# Patient Record
Sex: FEMALE | Race: WHITE | NOT HISPANIC OR LATINO | Employment: OTHER | URBAN - METROPOLITAN AREA
[De-identification: names, ages, dates, MRNs, and addresses within clinical notes are randomized per-mention and may not be internally consistent; named-entity substitution may affect disease eponyms.]

---

## 2017-04-24 ENCOUNTER — TELEPHONE (OUTPATIENT)
Dept: INTERNAL MEDICINE | Facility: CLINIC | Age: 77
End: 2017-04-24

## 2017-04-24 DIAGNOSIS — M89.9 DISORDER OF BONE AND CARTILAGE: Primary | ICD-10-CM

## 2017-04-24 DIAGNOSIS — M94.9 DISORDER OF BONE AND CARTILAGE: Primary | ICD-10-CM

## 2017-04-24 NOTE — TELEPHONE ENCOUNTER
Pt calling stating had PET Scan at the [facility] 4/18/17 and found had a broken rib, right side. Pt asking for last date of Dexa scan, informed 7/24/15. Pt asking if she should be on medication for brittle bones/osteoprosis. Should pt come in for office visit ? Pt asking to have order placed for upcoming Dexa scan.  Order pended.  Provider please review and advise.

## 2017-04-25 NOTE — TELEPHONE ENCOUNTER
Pt is not due for dexa until 07/17. Pl advise pt to make appt with PCP to discuss meds.-pl inform pt that -PCP back next week.

## 2017-04-26 NOTE — TELEPHONE ENCOUNTER
See the letter from the last DXA. She should be on treatment, if her rheumatologist is not going to manage this she should make appt with me.

## 2017-04-27 NOTE — TELEPHONE ENCOUNTER
This encounter was doned by another provider before we could call pt.     Attempted to contact pt. Left message to call clinic, her Dtrs phone had VM.

## 2017-05-05 ENCOUNTER — OFFICE VISIT (OUTPATIENT)
Dept: INTERNAL MEDICINE | Facility: CLINIC | Age: 77
End: 2017-05-05
Payer: COMMERCIAL

## 2017-05-05 VITALS
WEIGHT: 204 LBS | TEMPERATURE: 98.6 F | OXYGEN SATURATION: 92 % | BODY MASS INDEX: 42.82 KG/M2 | HEIGHT: 58 IN | HEART RATE: 82 BPM | RESPIRATION RATE: 16 BRPM | SYSTOLIC BLOOD PRESSURE: 128 MMHG | DIASTOLIC BLOOD PRESSURE: 68 MMHG

## 2017-05-05 DIAGNOSIS — M85.80 OSTEOPENIA: Primary | ICD-10-CM

## 2017-05-05 PROCEDURE — 99214 OFFICE O/P EST MOD 30 MIN: CPT | Performed by: INTERNAL MEDICINE

## 2017-05-05 RX ORDER — ALENDRONATE SODIUM 70 MG/1
70 TABLET ORAL
Qty: 4 TABLET | Refills: 11 | Status: SHIPPED | OUTPATIENT
Start: 2017-05-05 | End: 2017-06-23 | Stop reason: SINTOL

## 2017-05-05 RX ORDER — ACETAMINOPHEN 500 MG
500-1000 TABLET ORAL EVERY 4 HOURS PRN
COMMUNITY
End: 2020-12-17

## 2017-05-05 RX ORDER — TRAMADOL HYDROCHLORIDE 50 MG/1
50 TABLET ORAL 3 TIMES DAILY
COMMUNITY
End: 2020-12-17

## 2017-05-05 RX ORDER — LEVOCARNITINE 330 MG/1
TABLET ORAL
COMMUNITY
End: 2017-11-20

## 2017-05-05 RX ORDER — PREDNISONE 5 MG/1
5 TABLET ORAL DAILY
COMMUNITY

## 2017-05-05 RX ORDER — DOXYCYCLINE 100 MG/1
CAPSULE ORAL
COMMUNITY
End: 2017-08-18

## 2017-05-05 NOTE — MR AVS SNAPSHOT
"              After Visit Summary   2017    Nicolás Wyman    MRN: [id number]           Patient Information     Date Of Birth          1940        Visit Information        Provider Department      2017 3:40 PM Yocasta Hernandez MD [facility]        Today's Diagnoses     Osteopenia    -  1       Follow-ups after your visit        Who to contact     If you have questions or need follow up information about today's clinic visit or your schedule please contact [facility] directly at [phone].  Normal or non-critical lab and imaging results will be communicated to you by MyChart, letter or phone within 4 business days after the clinic has received the results. If you do not hear from us within 7 days, please contact the clinic through [employer] or phone. If you have a critical or abnormal lab result, we will notify you by phone as soon as possible.  Submit refill requests through [employer] or call your pharmacy and they will forward the refill request to us. Please allow 3 business days for your refill to be completed.          Additional Information About Your Visit        MyChart Information     [employer] lets you send messages to your doctor, view your test results, renew your prescriptions, schedule appointments and more. To sign up, go to [email or web address] . Click on \"Log in\" on the left side of the screen, which will take you to the Welcome page. Then click on \"Sign up Now\" on the right side of the page.     You will be asked to enter the access code listed below, as well as some personal information. Please follow the directions to create your username and password.     Your access code is: [email or web address]  Expires: 2017 12:23 PM     Your access code will  in 90 days. If you need help or a new code, please call your [facility] or [phone].        Care EveryWhere ID     This is your Care EveryWhere ID. This could be used by other organizations to access " "your [facility] medical records  [id number]        Your Vitals Were     Pulse Temperature Respirations Height Pulse Oximetry BMI (Body Mass Index)    82 98.6  F (37  C) (Oral) 16 4' 10\" (1.473 m) 92% 42.64 kg/m2       Blood Pressure from Last 3 Encounters:   05/05/17 128/68   10/28/16 132/60   11/13/15 110/68    Weight from Last 3 Encounters:   05/05/17 204 lb (92.5 kg)   10/28/16 201 lb (91.2 kg)   10/22/15 203 lb (92.1 kg)              Today, you had the following     No orders found for display         Today's Medication Changes          These changes are accurate as of: 5/5/17 11:59 PM.  If you have any questions, ask your nurse or doctor.               Start taking these medicines.        Dose/Directions    alendronate 70 MG tablet   Commonly known as:  FOSAMAX   Used for:  Osteopenia   Started by:  Yocasta Hernandez MD        Dose:  70 mg   Take 1 tablet (70 mg) by mouth every 7 days Take 60 minutes before am meal with 8 oz. water. Remain upright for 30 minutes.   Quantity:  4 tablet   Refills:  11            Where to get your medicines      These medications were sent to [facility] [address] [place] [id number]     Phone:  [phone]     alendronate 70 MG tablet                Primary Care Provider Office Phone # Fax #    Yocasta Hernandez [phone]       [facility] [address] [facility] [address]  [facility] [address]        Thank you!     Thank you for choosing [facility]  for your care. Our goal is always to provide you with excellent care. Hearing back from our patients is one way we can continue to improve our services. Please take a few minutes to complete the written survey that you may receive in the mail after your visit with us. Thank you!             Your Updated Medication List - Protect others around you: Learn how to safely use, store and throw away your medicines at www.disposemymeds.org.    "       This list is accurate as of: 5/5/17 11:59 PM.  Always use your most recent med list.                   Brand Name Dispense Instructions for use    * albuterol (2.5 MG/3ML) 0.083% neb solution     100 mL    Take 1 vial (2.5 mg) by nebulization every 6 hours as needed       * albuterol 108 (90 BASE) MCG/ACT Inhaler    PROAIR HFA/PROVENTIL HFA/VENTOLIN HFA    1 Inhaler    Inhale 2 puffs into the lungs every 6 hours as needed for shortness of breath / dyspnea       alendronate 70 MG tablet    FOSAMAX    4 tablet    Take 1 tablet (70 mg) by mouth every 7 days Take 60 minutes before am meal with 8 oz. water. Remain upright for 30 minutes.       amLODIPine 5 MG tablet    NORVASC    135 tablet    Take 1.5 tablets (7.5 mg) by mouth daily       aspirin 325 MG tablet     90 tablet    Take 1 tablet by mouth daily.       atenolol 100 MG tablet    TENORMIN    90 tablet    Take 1 tablet (100 mg) by mouth daily       clotrimazole-betamethasone cream    LOTRISONE    30 g    Apply topically 2 times daily       doxycycline 100 MG capsule    VIBRAMYCIN    180 capsule    Take 1 capsule by mouth 2 times daily.       doxycycline Monohydrate 100 MG Caps          fluticasone 110 MCG/ACT Inhaler    FLOVENT HFA    1 Inhaler    Inhale 2 puffs into the lungs 2 times daily       fluticasone 50 MCG/ACT spray    FLONASE    1 Package    Spray 1-2 sprays into both nostrils daily       glucosamine 500 MG Caps      Take 1 capful by mouth daily.       levOCARNitine 330 MG tablet    CARNITOR     Take by mouth 6 times daily       losartan 100 MG tablet    COZAAR    90 tablet    Take 1 tablet (100 mg) by mouth daily       PLAQUENIL 200 MG tablet   Generic drug:  hydroxychloroquine     60 tablet    Take 1 tablet by mouth 2 times daily.       PREDNISONE PO      Take 5 mg by mouth daily       SPIRIVA HANDIHALER IN      Reported on 5/5/2017       traMADol 50 MG tablet    ULTRAM     Take 100 mg by mouth       TYLENOL 500 MG tablet   Generic drug:   acetaminophen      Take 500-1,000 mg by mouth every 4 hours as needed for mild pain       * Notice:  This list has 2 medication(s) that are the same as other medications prescribed for you. Read the directions carefully, and ask your doctor or other care provider to review them with you.

## 2017-05-05 NOTE — NURSING NOTE
"Chief Complaint   Patient presents with     Consult     Discuss medication for osteoporosis       Initial /68  Pulse 82  Temp 98.6  F (37  C) (Oral)  Resp 16  Ht 4' 10\" (1.473 m)  Wt 204 lb (92.5 kg)  SpO2 92%  BMI 42.64 kg/m2 Estimated body mass index is 42.64 kg/(m^2) as calculated from the following:    Height as of this encounter: 4' 10\" (1.473 m).    Weight as of this encounter: 204 lb (92.5 kg).  Medication Reconciliation: complete      Josh Brito, TOMASZ      "

## 2017-05-05 NOTE — PROGRESS NOTES
SUBJECTIVE:                                                    Nicolás Wyman is a 77 year old female who presents to clinic today for the following health issues:    Subjective:  Nicolás Wyman is a 77 year old female who presents for follow up of abnormal DEXA. She is postmenopausal.   Other risks :RA.    Patient Active Problem List   Diagnosis     Essential hypertension, benign     Rheumatoid arthritis (HCC)     Disorder of bone and cartilage     CARDIOVASCULAR SCREENING; LDL GOAL LESS THAN 130     Advanced directives, counseling/discussion     Health Care Home     Gait disturbance     Non Hodgkin's lymphoma (H)     Pulmonary interstitial fibrosis (H)     JIGNESH (obstructive sleep apnea)     Morbid obesity, unspecified obesity type (H)      Current Outpatient Prescriptions   Medication Sig Dispense Refill     levOCARNitine (CARNITOR) 330 MG tablet Take by mouth 6 times daily       acetaminophen (TYLENOL) 500 MG tablet Take 500-1,000 mg by mouth every 4 hours as needed for mild pain       PREDNISONE PO Take 5 mg by mouth daily       alendronate (FOSAMAX) 70 MG tablet Take 1 tablet (70 mg) by mouth every 7 days Take 60 minutes before am meal with 8 oz. water. Remain upright for 30 minutes. 4 tablet 11     atenolol (TENORMIN) 100 MG tablet Take 1 tablet (100 mg) by mouth daily 90 tablet 3     losartan (COZAAR) 100 MG tablet Take 1 tablet (100 mg) by mouth daily 90 tablet 3     amLODIPine (NORVASC) 5 MG tablet Take 1.5 tablets (7.5 mg) by mouth daily 135 tablet 3     fluticasone (FLOVENT HFA) 110 MCG/ACT inhaler Inhale 2 puffs into the lungs 2 times daily 1 Inhaler 3     albuterol (PROAIR HFA, PROVENTIL HFA, VENTOLIN HFA) 108 (90 BASE) MCG/ACT inhaler Inhale 2 puffs into the lungs every 6 hours as needed for shortness of breath / dyspnea 1 Inhaler 5     albuterol (2.5 MG/3ML) 0.083% nebulizer solution Take 1 vial (2.5 mg) by nebulization every 6 hours as needed 100 mL 5     hydroxychloroquine (PLAQUENIL) 200 MG  "tablet Take 1 tablet by mouth 2 times daily. 60 tablet 1     aspirin 325 MG tablet Take 1 tablet by mouth daily. 90 tablet 3     doxycycline (VIBRAMYCIN) 100 MG capsule Take 1 capsule by mouth 2 times daily. 180 capsule 1     Glucosamine 500 MG CAPS Take 1 capful by mouth daily.       traMADol (ULTRAM) 50 MG tablet Take 100 mg by mouth       doxycycline Monohydrate 100 MG CAPS        Tiotropium Bromide Monohydrate (SPIRIVA HANDIHALER IN) Reported on 5/5/2017       clotrimazole-betamethasone (LOTRISONE) cream Apply topically 2 times daily (Patient not taking: Reported on 5/5/2017) 30 g 1     fluticasone (FLONASE) 50 MCG/ACT nasal spray Spray 1-2 sprays into both nostrils daily (Patient not taking: Reported on 5/5/2017) 1 Package 11        Other concerns: none    Objective:  Patient alert in NAD  /68  Pulse 82  Temp 98.6  F (37  C) (Oral)  Resp 16  Ht 4' 10\" (1.473 m)  Wt 204 lb (92.5 kg)  SpO2 92%  BMI 42.64 kg/m2      Not examined.    DXA: 2015  T-score -1.9  Total fracture risk: 15%  Hip fracture risk: 3.7%    Assessment/Plan:  1. Osteopenia: discussed results of DEXA, etiology and definitions of osteopenia and osteoporosis, risks, treatments. Recommend treat with Fosamax. Advised of potential side effects of medications and recommend she contact me for significant reactions.   Recheck 2 years.     Yocasta Hernandez MD  [facility]     30 minutes spent with the patient, >50% of time spent counseling about medication, fracture risk    "

## 2017-06-23 ENCOUNTER — TELEPHONE (OUTPATIENT)
Dept: INTERNAL MEDICINE | Facility: CLINIC | Age: 77
End: 2017-06-23

## 2017-06-23 DIAGNOSIS — M85.80 OSTEOPENIA, UNSPECIFIED LOCATION: Primary | ICD-10-CM

## 2017-06-23 NOTE — TELEPHONE ENCOUNTER
Taking a once monthly form, Actonel or Boniva may have less GI side effects than Fosamax. Since taken only once a month, it may not bother if there is some mild symptoms. The other option would be once a year infusion, Reclast, or 6 month injection, Prolia.   I would recommend trying one of the once a month forms, the insurance may require a trial one of them before approving one of the injections. We may have to do a PA for a monthly med also.

## 2017-06-23 NOTE — TELEPHONE ENCOUNTER
Pt states she started on Alendronate about 6 weeks ago. Has taken 6 pills so far.   After every pill she has taken, she gets stomach cramps and diarrhea.   She takes the pill in the AM, and by the afternoon she has diarrhea. The diarrhea lasts a couple of days. Then resolves for the rest of the week until she needs to take the next pill.     She is supposed to take #7 pill tomorrow, but she is not going to. She is going to stop these pills.     Please advise. She is open to changing to alternative.

## 2017-06-26 RX ORDER — RISEDRONATE SODIUM 150 MG/1
150 TABLET, FILM COATED ORAL
Qty: 1 TABLET | Refills: 11 | Status: SHIPPED | OUTPATIENT
Start: 2017-06-26 | End: 2017-11-20

## 2017-06-26 RX ORDER — RISEDRONATE SODIUM 150 MG/1
150 TABLET, FILM COATED ORAL
Qty: 3 TABLET | Refills: 3 | Status: SHIPPED | OUTPATIENT
Start: 2017-06-26 | End: 2017-06-26

## 2017-06-26 NOTE — TELEPHONE ENCOUNTER
Call to daughter. Updated on MD note. Requesting rx for one of the once a month options be ordered. No need for call back, just send rx per daughter.

## 2017-08-16 ENCOUNTER — TELEPHONE (OUTPATIENT)
Dept: INTERNAL MEDICINE | Facility: CLINIC | Age: 77
End: 2017-08-16

## 2017-08-16 NOTE — TELEPHONE ENCOUNTER
Patient called and state that she is experiencing left knee pain 10/10 but is able to ambulate. Patient is taking Prednisone, Tramadol and Tylenol for pain management. Writer asked if patient can wait to be seen in office or needs ED eval d/t pain. Patient stated she can wait. Appointment scheduled for patient on 8/18/17. Patient would like to get a cortisone shot to her left knee. Writer stated that patient should discuss this at the MD visit. Patient verbalized understanding.

## 2017-08-18 ENCOUNTER — OFFICE VISIT (OUTPATIENT)
Dept: INTERNAL MEDICINE | Facility: CLINIC | Age: 77
End: 2017-08-18
Payer: COMMERCIAL

## 2017-08-18 ENCOUNTER — RADIANT APPOINTMENT (OUTPATIENT)
Dept: GENERAL RADIOLOGY | Facility: CLINIC | Age: 77
End: 2017-08-18
Attending: NURSE PRACTITIONER
Payer: COMMERCIAL

## 2017-08-18 VITALS
OXYGEN SATURATION: 95 % | BODY MASS INDEX: 42.17 KG/M2 | HEART RATE: 83 BPM | SYSTOLIC BLOOD PRESSURE: 120 MMHG | HEIGHT: 58 IN | WEIGHT: 200.9 LBS | DIASTOLIC BLOOD PRESSURE: 60 MMHG | TEMPERATURE: 98.3 F

## 2017-08-18 DIAGNOSIS — R05.9 COUGH: ICD-10-CM

## 2017-08-18 DIAGNOSIS — B97.89 VIRAL RESPIRATORY ILLNESS: ICD-10-CM

## 2017-08-18 DIAGNOSIS — J98.8 VIRAL RESPIRATORY ILLNESS: ICD-10-CM

## 2017-08-18 DIAGNOSIS — M25.562 ACUTE PAIN OF LEFT KNEE: ICD-10-CM

## 2017-08-18 DIAGNOSIS — M25.562 ACUTE PAIN OF LEFT KNEE: Primary | ICD-10-CM

## 2017-08-18 PROCEDURE — 99213 OFFICE O/P EST LOW 20 MIN: CPT | Performed by: NURSE PRACTITIONER

## 2017-08-18 PROCEDURE — 73560 X-RAY EXAM OF KNEE 1 OR 2: CPT | Mod: LT

## 2017-08-18 RX ORDER — CODEINE PHOSPHATE AND GUAIFENESIN 10; 100 MG/5ML; MG/5ML
1 SOLUTION ORAL EVERY 4 HOURS PRN
Qty: 240 ML | Refills: 1 | Status: SHIPPED | OUTPATIENT
Start: 2017-08-18 | End: 2017-11-20

## 2017-08-18 NOTE — PATIENT INSTRUCTIONS
X ray in [address]    Dr Parker   Specialty building   [address]  [address]     8/25/2017 Friday at 9 am     Cough syrup with codeine for cough     Follow up with worsening sx or failure to improve or with any questions or concerns.

## 2017-08-18 NOTE — PROGRESS NOTES
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  Medial joint line pain     Prednisone for knee pain per rheumatology    Cold and cough since Monday - white secretions         Problem list and histories reviewed & adjusted, as indicated.  Additional history: as documented    Patient Active Problem List   Diagnosis     Essential hypertension, benign     Rheumatoid arthritis (HCC)     Disorder of bone and cartilage     CARDIOVASCULAR SCREENING; LDL GOAL LESS THAN 130     Advanced directives, counseling/discussion     Health Care Home     Gait disturbance     Non Hodgkin's lymphoma (H)     Pulmonary interstitial fibrosis (H)     JIGNESH (obstructive sleep apnea)     Morbid obesity, unspecified obesity type (H)     Past Surgical History:   Procedure Laterality Date     ARTHROPLASTY KNEE  11/7/2011    Procedure:ARTHROPLASTY KNEE; Right Total Knee Arthroplasty  [facility]; Surgeon:DIVINA PAZ; Location:[facility] OR     ARTHROPLASTY REVISION KNEE  12/29/2011    Procedure:ARTHROPLASTY REVISION KNEE; Irrigation and debridement, right total knee arthroplasty with polyethylene exchange.  ; Surgeon:DIVINA PAZ; Location:[facility] OR     C NONSPECIFIC PROCEDURE  6/22/12    removal of right TKA     C VAGINAL HYSTERECTOMY  1981    has ovaries      EXCHANGE POLY COMPONENT ARTHROPLASTY KNEE  11/26/2011    Procedure:EXCHANGE POLY COMPONENT ARTHROPLASTY KNEE; EXCHANGE POLY COMPONENT ARTHROPLASTY KNEE RIGHT, irrigation and debridement right knee; Surgeon:FELIPE TAYLOR; Location:[facility] OR     HC CORRECT BUNION,SIMPLE  2002     HC REMOVAL GALLBLADDER  1981     IRRIGATION AND DEBRIDEMENT KNEE, COMBINED  11/26/2011    Procedure:COMBINED IRRIGATION AND  "DEBRIDEMENT KNEE; Surgeon:FELIPE TAYLOR; Location:[facility] OR     IRRIGATION AND DEBRIDEMENT KNEE, COMBINED  12/29/2011    Procedure:COMBINED IRRIGATION AND DEBRIDEMENT KNEE; Surgeon:DIVINA BAUTISTA; Location:[facility] OR     IRRIGATION AND DEBRIDEMENT KNEE, COMBINED  2/9/2012    Procedure:COMBINED IRRIGATION AND DEBRIDEMENT KNEE; Wound Debridement Right Knee with Placement of Wound Vac; Surgeon:DIVINA BAUTISTA; Location:[facility] OR       Social History   Substance Use Topics     Smoking status: Never Smoker     Smokeless tobacco: Never Used     Alcohol use No     Family History   Problem Relation Age of Onset     HEART DISEASE Mother      Hypertension Mother      Eye Disorder Mother      macular degen     Family History Negative Father      Blood Disease Daughter      non-hodgkins lymphoma in 1985     Hypertension Sister      Lipids Sister      Respiratory Daughter      asthma     Coronary Artery Disease Daughter      Hypertension Daughter              Reviewed and updated as needed this visit by clinical staffTobacco  Allergies  Meds  Problems  Med Hx  Surg Hx  Fam Hx  Soc Hx        Reviewed and updated as needed this visit by Provider         ROS:  Constitutional, HEENT, cardiovascular, pulmonary, GI, , musculoskeletal, neuro, skin, endocrine and psych systems are negative, except as otherwise noted.      OBJECTIVE:   /60  Pulse 83  Temp 98.3  F (36.8  C) (Oral)  Ht 4' 10\" (1.473 m)  Wt 200 lb 14.4 oz (91.1 kg)  SpO2 95%  BMI 41.99 kg/m2  Body mass index is 41.99 kg/(m^2).  GENERAL:  alert and no distress  RESP: lungs clear to auscultation - no rales, rhonchi or wheezes  CV: regular rate and rhythm  MS: pain in right knee - in joint line medial pain and mild lateral discomfort ; discomfort with patella compression   PSYCH: mentation appears normal, affect normal/bright    Diagnostic Test Results:  X ray pending     ASSESSMENT/PLAN:             1. Acute pain of left knee  Will x ray and have " her see ortho   - XR Knee Left 1/2 Views; Future    2. Viral respiratory illness  Discussed antibiotics not indicated   Lungs clear and white secretions     3. Cough    - guaiFENesin-codeine (ROBITUSSIN AC) 100-10 MG/5ML SOLN solution; Take 5 mLs by mouth every 4 hours as needed for cough  Dispense: 240 mL; Refill: 1    Patient Instructions   X ray in [address]    Dr Parker   [facility]   [address]  [address]     8/25/2017 Friday at 9 am     Cough syrup with codeine for cough     Follow up with worsening sx or failure to improve or with any questions or concerns.         PARIS Medina [facility]

## 2017-08-18 NOTE — MR AVS SNAPSHOT
After Visit Summary   8/18/2017    Nicolás Wyman    MRN: [id number]           Patient Information     Date Of Birth          1940        Visit Information        Provider Department      8/18/2017 8:40 AM Ivonne Hays APRN CNP [facility]        Today's Diagnoses     Acute pain of left knee    -  1    Viral respiratory illness        Cough          Care Instructions    X ray in [address]    Dr Parker   Specialty building   [address]  [address] [facility]     8/25/2017 Friday at 9 am     Cough syrup with codeine for cough     Follow up with worsening sx or failure to improve or with any questions or concerns.             Follow-ups after your visit        Your next 10 appointments already scheduled     Aug 25, 2017  9:00 AM CDT   [facility]   [facility] [place]    [address] [facility]  [address]  [facility] [address]   [phone]              Future tests that were ordered for you today     Open Future Orders        Priority Expected Expires Ordered    XR Knee Left 1/2 Views Routine 8/18/2017 8/18/2018 8/18/2017            Who to contact     If you have questions or need follow up information about today's clinic visit or your schedule please contact [facility] directly at [phone].  Normal or non-critical lab and imaging results will be communicated to you by MyChart, letter or phone within 4 business days after the clinic has received the results. If you do not hear from us within 7 days, please contact the clinic through MyChart or phone. If you have a critical or abnormal lab result, we will notify you by phone as soon as possible.  Submit refill requests through [employer] or call your pharmacy and they will forward the refill request to us. Please allow 3 business days for your refill to be completed.          Additional Information About Your Visit        Best[employer]  "Information     [employer] lets you send messages to your doctor, view your test results, renew your prescriptions, schedule appointments and more. To sign up, go to [email or web address] . Click on \"Log in\" on the left side of the screen, which will take you to the Welcome page. Then click on \"Sign up Now\" on the right side of the page.     You will be asked to enter the access code listed below, as well as some personal information. Please follow the directions to create your username and password.     Your access code is: [email or web address]  Expires: 2017  8:59 AM     Your access code will  in 90 days. If you need help or a new code, please call your [place] clinic or [phone].        Care EveryWhere ID     This is your Care EveryWhere ID. This could be used by other organizations to access your [place] medical records  [id number]        Your Vitals Were     Pulse Temperature Height Pulse Oximetry BMI (Body Mass Index)       83 98.3  F (36.8  C) (Oral) 4' 10\" (1.473 m) 95% 41.99 kg/m2        Blood Pressure from Last 3 Encounters:   17 120/60   17 128/68   10/28/16 132/60    Weight from Last 3 Encounters:   17 200 lb 14.4 oz (91.1 kg)   17 204 lb (92.5 kg)   10/28/16 201 lb (91.2 kg)              We Performed the Following     PAF COMPLETED          Today's Medication Changes          These changes are accurate as of: 17  9:02 AM.  If you have any questions, ask your nurse or doctor.               Start taking these medicines.        Dose/Directions    guaiFENesin-codeine 100-10 MG/5ML Soln solution   Commonly known as:  ROBITUSSIN AC   Used for:  Cough   Started by:  Ivonne aHys APRN CNP        Dose:  1 tsp.   Take 5 mLs by mouth every 4 hours as needed for cough   Quantity:  240 mL   Refills:  1            Where to get your medicines      Some of these will need a paper prescription and others can be bought over the counter.  Ask your nurse if you have " questions.     Bring a paper prescription for each of these medications     guaiFENesin-codeine 100-10 MG/5ML Soln solution                Primary Care Provider Office Phone # Fax #    Yocasta Hernandez [phone]       [address]  [facility] [address]        Equal Access to Services     NICOLE SEXTON : Hadii aad ku hadasho Soomaali, waaxda luqadaha, qaybta kaalmada adeegyada, waxay idiin hayaan adeeg lynne larafael . So [facility] [phone].    ATENCIÓN: Si habla español, tiene a alexandre disposición servicios gratuitos de asistencia lingüística. [facility] [phone].    We comply with applicable federal civil rights laws and [place] laws. We do not discriminate on the basis of race, color, national origin, age, disability sex, sexual orientation or gender identity.            Thank you!     Thank you for choosing [facility]  for your care. Our goal is always to provide you with excellent care. Hearing back from our patients is one way we can continue to improve our services. Please take a few minutes to complete the written survey that you may receive in the mail after your visit with us. Thank you!             Your Updated Medication List - Protect others around you: Learn how to safely use, store and throw away your medicines at www.disposemymeds.org.          This list is accurate as of: 8/18/17  9:02 AM.  Always use your most recent med list.                   Brand Name Dispense Instructions for use Diagnosis    * albuterol (2.5 MG/3ML) 0.083% neb solution     100 mL    Take 1 vial (2.5 mg) by nebulization every 6 hours as needed    Bronchitis with bronchospasm       * albuterol 108 (90 BASE) MCG/ACT Inhaler    PROAIR HFA/PROVENTIL HFA/VENTOLIN HFA    1 Inhaler    Inhale 2 puffs into the lungs every 6 hours as needed for shortness of breath / dyspnea    Cough       amLODIPine 5 MG tablet    NORVASC    135 tablet    Take 1.5 tablets (7.5 mg) by mouth daily    Essential  hypertension, benign       aspirin 325 MG tablet     90 tablet    Take 1 tablet by mouth daily.        atenolol 100 MG tablet    TENORMIN    90 tablet    Take 1 tablet (100 mg) by mouth daily    Essential hypertension, benign       clotrimazole-betamethasone cream    LOTRISONE    30 g    Apply topically 2 times daily    Yeast infection of the skin       doxycycline 100 MG capsule    VIBRAMYCIN    180 capsule    Take 1 capsule by mouth 2 times daily.    Septic arthritis (H)       glucosamine 500 MG Caps      Take 1 capful by mouth daily.        guaiFENesin-codeine 100-10 MG/5ML Soln solution    ROBITUSSIN AC    240 mL    Take 5 mLs by mouth every 4 hours as needed for cough    Cough       levOCARNitine 330 MG tablet    CARNITOR     Take by mouth 6 times daily        losartan 100 MG tablet    COZAAR    90 tablet    Take 1 tablet (100 mg) by mouth daily    Essential hypertension, benign       PLAQUENIL 200 MG tablet   Generic drug:  hydroxychloroquine     60 tablet    Take 1 tablet by mouth 2 times daily.        PREDNISONE PO      Take 5 mg by mouth daily        RISEdronate 150 MG tablet    ACTONEL    1 tablet    Take 1 tablet (150 mg) by mouth every 30 days Take 60 minutes before am meal with 8 oz. Water. Do not lie down until after eating.    Osteopenia, unspecified location       SPIRIVA HANDIHALER IN      Reported on 5/5/2017        traMADol 50 MG tablet    ULTRAM     Take 100 mg by mouth        TYLENOL 500 MG tablet   Generic drug:  acetaminophen      Take 500-1,000 mg by mouth every 4 hours as needed for mild pain        * Notice:  This list has 2 medication(s) that are the same as other medications prescribed for you. Read the directions carefully, and ask your doctor or other care provider to review them with you.

## 2017-08-18 NOTE — NURSING NOTE
"Chief Complaint   Patient presents with     Musculoskeletal Problem     left knee       Initial /60  Pulse 83  Temp 98.3  F (36.8  C) (Oral)  Ht 4' 10\" (1.473 m)  Wt 200 lb 14.4 oz (91.1 kg)  SpO2 95%  BMI 41.99 kg/m2 Estimated body mass index is 41.99 kg/(m^2) as calculated from the following:    Height as of this encounter: 4' 10\" (1.473 m).    Weight as of this encounter: 200 lb 14.4 oz (91.1 kg).  Medication Reconciliation: complete    "

## 2017-08-25 ENCOUNTER — OFFICE VISIT (OUTPATIENT)
Dept: ORTHOPEDICS | Facility: CLINIC | Age: 77
End: 2017-08-25
Payer: COMMERCIAL

## 2017-08-25 VITALS — WEIGHT: 201 LBS | HEIGHT: 58 IN | BODY MASS INDEX: 42.19 KG/M2 | HEART RATE: 80 BPM

## 2017-08-25 DIAGNOSIS — M17.10 PRIMARY OSTEOARTHRITIS OF KNEE, UNSPECIFIED LATERALITY: Primary | ICD-10-CM

## 2017-08-25 PROCEDURE — 99203 OFFICE O/P NEW LOW 30 MIN: CPT | Mod: 25 | Performed by: ORTHOPAEDIC SURGERY

## 2017-08-25 PROCEDURE — 20610 DRAIN/INJ JOINT/BURSA W/O US: CPT | Mod: RT | Performed by: ORTHOPAEDIC SURGERY

## 2017-08-25 NOTE — PROGRESS NOTES
HISTORY OF PRESENT ILLNESS:    Nicolás Wyman is a 77 year old female who is seen in consultation at the request of Ivonne Hays for right knee pain    Present symptoms: pain has been going on about 2-3 weeks, worse anteriorly and medial.  No popping, instability described.  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    removal right TKA 6/12     Non Hodgkin's lymphoma (H) 6/14    chemo x4, clearing by PET     Pulmonary interstitial fibrosis (H)      Rheumatoid arthritis(714.0)     [facility]     Steroid long-term use        Past Surgical History:   Procedure Laterality Date     ARTHROPLASTY KNEE  11/7/2011    Procedure:ARTHROPLASTY KNEE; Right Total Knee Arthroplasty  [facility]; Surgeon:DIVINA BAUTISTA; Location:[facility] OR     ARTHROPLASTY REVISION KNEE  12/29/2011    Procedure:ARTHROPLASTY REVISION KNEE; Irrigation and debridement, right total knee arthroplasty with polyethylene exchange.  ; Surgeon:DIVINA BAUTISTA; Location:[facility] OR     C NONSPECIFIC PROCEDURE  6/22/12    removal of right TKA     C VAGINAL HYSTERECTOMY  1981    has ovaries      EXCHANGE POLY COMPONENT ARTHROPLASTY KNEE  11/26/2011    Procedure:EXCHANGE POLY COMPONENT ARTHROPLASTY KNEE; EXCHANGE POLY COMPONENT ARTHROPLASTY KNEE RIGHT, irrigation and debridement right knee; Surgeon:FELIPE TAYLOR; Location:[facility] OR     HC CORRECT BUNION,SIMPLE  2002     HC REMOVAL GALLBLADDER  1981     IRRIGATION AND DEBRIDEMENT KNEE, COMBINED  11/26/2011    Procedure:COMBINED IRRIGATION AND DEBRIDEMENT KNEE; Surgeon:FELIPE TAYLOR;  Location:[facility] OR     IRRIGATION AND DEBRIDEMENT KNEE, COMBINED  12/29/2011    Procedure:COMBINED IRRIGATION AND DEBRIDEMENT KNEE; Surgeon:DIVINA BAUTISTA; Location:[facility] OR     IRRIGATION AND DEBRIDEMENT KNEE, COMBINED  2/9/2012    Procedure:COMBINED IRRIGATION AND DEBRIDEMENT KNEE; Wound Debridement Right Knee with Placement of Wound Vac; Surgeon:DIVINA BAUTISTA; Location:[facility] OR       Family History   Problem Relation Age of Onset     HEART DISEASE Mother      Hypertension Mother      Eye Disorder Mother      macular degen     Family History Negative Father      Blood Disease Daughter      non-hodgkins lymphoma in 1985     Hypertension Sister      Lipids Sister      Respiratory Daughter      asthma     Coronary Artery Disease Daughter      Hypertension Daughter        Social History     Social History     Marital status:      Spouse name: N/A     Number of children: 4     Years of education: N/A     Occupational History     Not on file.     Social History Main Topics     Smoking status: Never Smoker     Smokeless tobacco: Never Used     Alcohol use No     Drug use: No     Sexual activity: No     Other Topics Concern     Exercise Yes     Seat Belt Yes     Social History Narrative       Current Outpatient Prescriptions   Medication Sig Dispense Refill     guaiFENesin-codeine (ROBITUSSIN AC) 100-10 MG/5ML SOLN solution Take 5 mLs by mouth every 4 hours as needed for cough 240 mL 1     RISEdronate (ACTONEL) 150 MG tablet Take 1 tablet (150 mg) by mouth every 30 days Take 60 minutes before am meal with 8 oz. Water. Do not lie down until after eating. 1 tablet 11     levOCARNitine (CARNITOR) 330 MG tablet Take by mouth 6 times daily       traMADol (ULTRAM) 50 MG tablet Take 100 mg by mouth       acetaminophen (TYLENOL) 500 MG tablet Take 500-1,000 mg by mouth every 4 hours as needed for mild pain       PREDNISONE PO Take 5 mg by mouth daily       Tiotropium Bromide Monohydrate (SPIRIVA HANDIHALER IN)  Reported on 5/5/2017       atenolol (TENORMIN) 100 MG tablet Take 1 tablet (100 mg) by mouth daily 90 tablet 3     losartan (COZAAR) 100 MG tablet Take 1 tablet (100 mg) by mouth daily 90 tablet 3     amLODIPine (NORVASC) 5 MG tablet Take 1.5 tablets (7.5 mg) by mouth daily 135 tablet 3     clotrimazole-betamethasone (LOTRISONE) cream Apply topically 2 times daily (Patient not taking: Reported on 5/5/2017) 30 g 1     albuterol (PROAIR HFA, PROVENTIL HFA, VENTOLIN HFA) 108 (90 BASE) MCG/ACT inhaler Inhale 2 puffs into the lungs every 6 hours as needed for shortness of breath / dyspnea 1 Inhaler 5     albuterol (2.5 MG/3ML) 0.083% nebulizer solution Take 1 vial (2.5 mg) by nebulization every 6 hours as needed 100 mL 5     hydroxychloroquine (PLAQUENIL) 200 MG tablet Take 1 tablet by mouth 2 times daily. 60 tablet 1     aspirin 325 MG tablet Take 1 tablet by mouth daily. 90 tablet 3     doxycycline (VIBRAMYCIN) 100 MG capsule Take 1 capsule by mouth 2 times daily. 180 capsule 1     Glucosamine 500 MG CAPS Take 1 capful by mouth daily.         Allergies   Allergen Reactions     Ace Inhibitors Cough     Tape [Adhesive Tape] Rash       REVIEW OF SYSTEMS:  CONSTITUTIONAL:  NEGATIVE for fever, chills, change in weight  INTEGUMENTARY/SKIN:  NEGATIVE for worrisome rashes, moles or lesions  EYES:  NEGATIVE for vision changes or irritation  ENT/MOUTH:  NEGATIVE for ear, mouth and throat problems  RESP:  NEGATIVE for significant cough or SOB  BREAST:  NEGATIVE for masses, tenderness or discharge  CV:  NEGATIVE for chest pain, palpitations or peripheral edema  GI:  NEGATIVE for nausea, abdominal pain, heartburn, or change in bowel habits  :  Negative   MUSCULOSKELETAL:  See HPI above  NEURO:  NEGATIVE for weakness, dizziness or paresthesias  ENDOCRINE:  NEGATIVE for temperature intolerance, skin/hair changes  HEME/ALLERGY/IMMUNE:  NEGATIVE for bleeding problems  PSYCHIATRIC:  NEGATIVE for changes in mood or affect      PHYSICAL  "EXAM:  Pulse 80  Ht 4' 10\" (1.473 m)  Wt 201 lb (91.2 kg)  BMI 42.01 kg/m2  Body mass index is 42.01 kg/(m^2).   GENERAL APPEARANCE: healthy, alert and no distress   SKIN: no suspicious lesions or rashes  NEURO: Normal strength and tone, mentation intact and speech normal  VASCULAR: Good pulses, and capillary refill   LYMPH: no lymphadenopathy   PSYCH:  mentation appears normal and affect normal/bright    MSK:  A&OX3, NAD  Neck supple, no lymphadenopathy  The patient ambulates without an antalgic gait.  The patient is able to get on and off the exam table without difficulty.      Examination of the spine reveals a normal lordosis to the cervical and lumbar spine, and a normal kyphosis to the thoracic spine.  There is no clinical evidence of scoliosis.    The pelvis is clinically level.  Trendelenberg is negative.  The patient is non-tender to palpation over the greater trochanteric bursal region or piriformis fossa.  With the hip flexed to 90 degrees, internal and external rotation is 30/60 respectively,  with no pain .      KNEE: Examination of the right knee reveals the lower extremity to have a Normal anatomic alignment.  There is no erythema, ecchymosis nor edema.  There is minimal effusion present clinically.  There is no significant warmth.  Range of motion is 0 to 120 degrees, without crepitus.  There is mild tenderness to palpation at the both medial and lateral joint line.  Iraida's is negative without crepitus. The knee is ligamentously stable. Patello-femoral grind test is positive.      The calves and thighs are symmetric, without atrophy and non-tender to palpation. Manuel's sign is negative, bilaterally.   CMS is intact to the toes.        ASSESSMENT / PLAN: Primary osteoarthritis right knee. I offered him a corticosteroid injection, which he accepted. Following a sterile prep, 40 mg of Depo-Medrol 4 mg of dexamethasone along with 3 cc of 1% lidocaine was instilled into his right knee. He will " return to see us on an as-needed basis.    Imaging Interpretation:         Gurpreet Parker MD  Department of Orthopedic Surgery        Disclaimer: This note consists of symbols derived from keyboarding, dictation and/or voice recognition software. As a result, there may be errors in the script that have gone undetected. Please consider this when interpreting information found in this chart.

## 2017-08-25 NOTE — MR AVS SNAPSHOT
"              After Visit Summary   2017    Nicolás Wyman    MRN: [id number]           Patient Information     Date Of Birth          1940        Visit Information        Provider Department      2017 9:00 AM Anurag Parker MD [facility] ORTHOPEDIC SURGERY        Today's Diagnoses     Primary osteoarthritis of knee, unspecified laterality    -  1       Follow-ups after your visit        Who to contact     If you have questions or need follow up information about today's clinic visit or your schedule please contact [facility] ORTHOPEDIC SURGERY directly at [phone].  Normal or non-critical lab and imaging results will be communicated to you by [employer], letter or phone within 4 business days after the clinic has received the results. If you do not hear from us within 7 days, please contact the clinic through [facility] or phone. If you have a critical or abnormal lab result, we will notify you by phone as soon as possible.  Submit refill requests through [employer] or call your pharmacy and they will forward the refill request to us. Please allow 3 business days for your refill to be completed.          Additional Information About Your Visit        MyChart Information     [employer] lets you send messages to your doctor, view your test results, renew your prescriptions, schedule appointments and more. To sign up, go to [email or web address] . Click on \"Log in\" on the left side of the screen, which will take you to the Welcome page. Then click on \"Sign up Now\" on the right side of the page.     You will be asked to enter the access code listed below, as well as some personal information. Please follow the directions to create your username and password.     Your access code is: [email or web address]  Expires: 2017  8:59 AM     Your access code will  in 90 days. If you need help or a new code, please call your [place] clinic or [phone].        Care EveryWhere ID     This is your " "Care EveryWhere ID. This could be used by other organizations to access your [place] medical records  [id number]        Your Vitals Were     Pulse Height BMI (Body Mass Index)             80 4' 10\" (1.473 m) 42.01 kg/m2          Blood Pressure from Last 3 Encounters:   08/18/17 120/60   05/05/17 128/68   10/28/16 132/60    Weight from Last 3 Encounters:   08/25/17 201 lb (91.2 kg)   08/18/17 200 lb 14.4 oz (91.1 kg)   05/05/17 204 lb (92.5 kg)              We Performed the Following     Depo Medrol 10 MG,  20 MG , or 40 MG    [] (Same charge for all MG)     DEXAMETHASONE SODIUM PHOS PER 1 MG     Large Joint/Bursa injection and/or drainage (Shoulder, Knee)          Today's Medication Changes          These changes are accurate as of: 8/25/17 11:59 PM.  If you have any questions, ask your nurse or doctor.               Start taking these medicines.        Dose/Directions    dexamethasone 4 MG/ML injection   Commonly known as:  DECADRON   Used for:  Primary osteoarthritis of knee, unspecified laterality   Started by:  Anurag Parker MD        Dose:  4 mg   Inject 1 mL (4 mg) as directed once for 1 dose Use 4 mg or dose determined by provider for iontophoresis.   Quantity:  1 mL   Refills:  0       methylPREDNISolone acetate 40 MG/ML injection   Commonly known as:  DEPO-MEDROL   Used for:  Primary osteoarthritis of knee, unspecified laterality   Started by:  Anurag Parker MD        Dose:  40 mg   1 mL (40 mg) by INTRA-ARTICULAR route once for 1 dose   Quantity:  1 mL   Refills:  0            Where to get your medicines      Some of these will need a paper prescription and others can be bought over the counter.  Ask your nurse if you have questions.     You don't need a prescription for these medications     dexamethasone 4 MG/ML injection    methylPREDNISolone acetate 40 MG/ML injection                Primary Care Provider Office Phone # Fax #    Yocasta Hernandez [phone]       " [address]   [facility] [address]        Equal Access to Services     EDILMARANDALL DARSHAN : Hadii aad ku hadrebelo Sopalali, waaxda luqadaha, qaybta kaalmada mirandageorgi, andrea fidelin hayaazoila jeanbala batista laoctavianozoila . So [facility] [phone].    ATENCIÓN: Si habla español, tiene a alexandre disposición servicios gratuitos de asistencia lingüística. Llame al [phone].    We comply with applicable federal civil rights laws and [place] laws. We do not discriminate on the basis of race, color, national origin, age, disability sex, sexual orientation or gender identity.            Thank you!     Thank you for choosing [facility] ORTHOPEDIC SURGERY  for your care. Our goal is always to provide you with excellent care. Hearing back from our patients is one way we can continue to improve our services. Please take a few minutes to complete the written survey that you may receive in the mail after your visit with us. Thank you!             Your Updated Medication List - Protect others around you: Learn how to safely use, store and throw away your medicines at www.disposemymeds.org.          This list is accurate as of: 8/25/17 11:59 PM.  Always use your most recent med list.                   Brand Name Dispense Instructions for use Diagnosis    * albuterol (2.5 MG/3ML) 0.083% neb solution     100 mL    Take 1 vial (2.5 mg) by nebulization every 6 hours as needed    Bronchitis with bronchospasm       * albuterol 108 (90 BASE) MCG/ACT Inhaler    PROAIR HFA/PROVENTIL HFA/VENTOLIN HFA    1 Inhaler    Inhale 2 puffs into the lungs every 6 hours as needed for shortness of breath / dyspnea    Cough       amLODIPine 5 MG tablet    NORVASC    135 tablet    Take 1.5 tablets (7.5 mg) by mouth daily    Essential hypertension, benign       aspirin 325 MG tablet     90 tablet    Take 1 tablet by mouth daily.        atenolol 100 MG tablet    TENORMIN    90 tablet    Take 1 tablet (100 mg) by mouth daily    Essential hypertension, benign        clotrimazole-betamethasone cream    LOTRISONE    30 g    Apply topically 2 times daily    Yeast infection of the skin       dexamethasone 4 MG/ML injection    DECADRON    1 mL    Inject 1 mL (4 mg) as directed once for 1 dose Use 4 mg or dose determined by provider for iontophoresis.    Primary osteoarthritis of knee, unspecified laterality       doxycycline 100 MG capsule    VIBRAMYCIN    180 capsule    Take 1 capsule by mouth 2 times daily.    Septic arthritis (H)       glucosamine 500 MG Caps      Take 1 capful by mouth daily.        guaiFENesin-codeine 100-10 MG/5ML Soln solution    ROBITUSSIN AC    240 mL    Take 5 mLs by mouth every 4 hours as needed for cough    Cough       levOCARNitine 330 MG tablet    CARNITOR     Take by mouth 6 times daily        losartan 100 MG tablet    COZAAR    90 tablet    Take 1 tablet (100 mg) by mouth daily    Essential hypertension, benign       methylPREDNISolone acetate 40 MG/ML injection    DEPO-MEDROL    1 mL    1 mL (40 mg) by INTRA-ARTICULAR route once for 1 dose    Primary osteoarthritis of knee, unspecified laterality       PLAQUENIL 200 MG tablet   Generic drug:  hydroxychloroquine     60 tablet    Take 1 tablet by mouth 2 times daily.        PREDNISONE PO      Take 5 mg by mouth daily        RISEdronate 150 MG tablet    ACTONEL    1 tablet    Take 1 tablet (150 mg) by mouth every 30 days Take 60 minutes before am meal with 8 oz. Water. Do not lie down until after eating.    Osteopenia, unspecified location       SPIRIVA HANDIHALER IN      Reported on 5/5/2017        traMADol 50 MG tablet    ULTRAM     Take 100 mg by mouth        TYLENOL 500 MG tablet   Generic drug:  acetaminophen      Take 500-1,000 mg by mouth every 4 hours as needed for mild pain        * Notice:  This list has 2 medication(s) that are the same as other medications prescribed for you. Read the directions carefully, and ask your doctor or other care provider to review them with you.

## 2017-08-25 NOTE — NURSING NOTE
"Chief Complaint   Patient presents with     Musculoskeletal Problem     left knee pain       Initial Pulse 80  Ht 4' 10\" (1.473 m)  Wt 201 lb (91.2 kg)  BMI 42.01 kg/m2 Estimated body mass index is 42.01 kg/(m^2) as calculated from the following:    Height as of this encounter: 4' 10\" (1.473 m).    Weight as of this encounter: 201 lb (91.2 kg).  Medication Reconciliation: complete     Balaji Javier MS, ATC      "

## 2017-09-14 RX ORDER — METHYLPREDNISOLONE ACETATE 40 MG/ML
40 INJECTION, SUSPENSION INTRA-ARTICULAR; INTRALESIONAL; INTRAMUSCULAR; SOFT TISSUE ONCE
Qty: 1 ML | Refills: 0 | OUTPATIENT
Start: 2017-09-14 | End: 2017-09-14

## 2017-09-14 RX ORDER — DEXAMETHASONE SODIUM PHOSPHATE 4 MG/ML
4 INJECTION, SOLUTION INTRA-ARTICULAR; INTRALESIONAL; INTRAMUSCULAR; INTRAVENOUS; SOFT TISSUE ONCE
Qty: 1 ML | Refills: 0 | OUTPATIENT
Start: 2017-09-14 | End: 2017-11-20

## 2017-10-17 ENCOUNTER — TRANSFERRED RECORDS (OUTPATIENT)
Dept: HEALTH INFORMATION MANAGEMENT | Facility: CLINIC | Age: 77
End: 2017-10-17

## 2017-11-07 DIAGNOSIS — I10 ESSENTIAL HYPERTENSION, BENIGN: ICD-10-CM

## 2017-11-07 RX ORDER — AMLODIPINE BESYLATE 5 MG/1
TABLET ORAL
Qty: 135 TABLET | Refills: 0 | Status: SHIPPED | OUTPATIENT
Start: 2017-11-07 | End: 2017-11-20

## 2017-11-07 RX ORDER — ATENOLOL 100 MG/1
TABLET ORAL
Qty: 90 TABLET | Refills: 0 | Status: SHIPPED | OUTPATIENT
Start: 2017-11-07 | End: 2017-11-20

## 2017-11-20 ENCOUNTER — OFFICE VISIT (OUTPATIENT)
Dept: INTERNAL MEDICINE | Facility: CLINIC | Age: 77
End: 2017-11-20
Payer: COMMERCIAL

## 2017-11-20 VITALS
SYSTOLIC BLOOD PRESSURE: 120 MMHG | TEMPERATURE: 97.8 F | HEART RATE: 80 BPM | WEIGHT: 205.5 LBS | DIASTOLIC BLOOD PRESSURE: 68 MMHG | OXYGEN SATURATION: 95 % | BODY MASS INDEX: 42.95 KG/M2

## 2017-11-20 DIAGNOSIS — M05.79 RHEUMATOID ARTHRITIS INVOLVING MULTIPLE SITES WITH POSITIVE RHEUMATOID FACTOR (H): ICD-10-CM

## 2017-11-20 DIAGNOSIS — J84.10 PULMONARY INTERSTITIAL FIBROSIS (H): ICD-10-CM

## 2017-11-20 DIAGNOSIS — M85.80 OSTEOPENIA, UNSPECIFIED LOCATION: ICD-10-CM

## 2017-11-20 DIAGNOSIS — I10 ESSENTIAL HYPERTENSION, BENIGN: Primary | ICD-10-CM

## 2017-11-20 DIAGNOSIS — C85.90 NON-HODGKIN'S LYMPHOMA, UNSPECIFIED BODY REGION, UNSPECIFIED NON-HODGKIN LYMPHOMA TYPE (H): ICD-10-CM

## 2017-11-20 DIAGNOSIS — M25.562 LEFT KNEE PAIN, UNSPECIFIED CHRONICITY: ICD-10-CM

## 2017-11-20 PROCEDURE — 82043 UR ALBUMIN QUANTITATIVE: CPT | Performed by: INTERNAL MEDICINE

## 2017-11-20 PROCEDURE — 99214 OFFICE O/P EST MOD 30 MIN: CPT | Performed by: INTERNAL MEDICINE

## 2017-11-20 RX ORDER — RISEDRONATE SODIUM 150 MG/1
150 TABLET, FILM COATED ORAL
Qty: 3 TABLET | Refills: 3 | Status: SHIPPED | OUTPATIENT
Start: 2017-11-20 | End: 2018-09-05

## 2017-11-20 RX ORDER — ATENOLOL 100 MG/1
100 TABLET ORAL DAILY
Qty: 90 TABLET | Refills: 3 | Status: SHIPPED | OUTPATIENT
Start: 2017-11-20 | End: 2018-09-05

## 2017-11-20 RX ORDER — AMLODIPINE BESYLATE 5 MG/1
TABLET ORAL
Qty: 135 TABLET | Refills: 3 | Status: SHIPPED | OUTPATIENT
Start: 2017-11-20 | End: 2019-01-15

## 2017-11-20 RX ORDER — LEVOCARNITINE 330 MG/1
990 TABLET ORAL 2 TIMES DAILY
COMMUNITY
Start: 2017-11-20

## 2017-11-20 RX ORDER — LOSARTAN POTASSIUM 100 MG/1
100 TABLET ORAL DAILY
Qty: 90 TABLET | Refills: 3 | Status: SHIPPED | OUTPATIENT
Start: 2017-11-20 | End: 2018-11-15

## 2017-11-20 NOTE — PROGRESS NOTES
SUBJECTIVE:   Nicolás Wyman is a 77 year old female who presents to clinic today for the following health issues:      Hypertension Follow-up      Outpatient blood pressures are not being checked.    Low Salt Diet: no added salt        Amount of exercise or physical activity: None    Problems taking medications regularly: No    Medication side effects: none    Diet: low salt      Other problems:   1. RA: overall stable, remains on Tramadol  2. Lymphoma: managed by [facility], \[facility] [place] [facility]\""  3. Pulmonary fibrosis: on mucomyst oral due to significant thick mucus and carinitine. Overall breathing is stable  4. Osteopenia: tolerating Actonel well. DXA next year .    Current Concerns:   Her left knee continues to hurt a lot. She had seen ortho in August, had another injection under US at [facility] last month but neither helped at all.       Patient Active Problem List   Diagnosis     Essential hypertension, benign     Rheumatoid arthritis (HCC)     Disorder of bone and cartilage     CARDIOVASCULAR SCREENING; LDL GOAL LESS THAN 130     Advanced directives, counseling/discussion     Health Care Home     Gait disturbance     Non Hodgkin's lymphoma (H)     Pulmonary interstitial fibrosis (H)     JIGNESH (obstructive sleep apnea)     Morbid obesity, unspecified obesity type (H)       Current Outpatient Prescriptions   Medication Sig Dispense Refill     atenolol (TENORMIN) 100 MG tablet Take 1 tablet (100 mg) by mouth daily 90 tablet 3     losartan (COZAAR) 100 MG tablet Take 1 tablet (100 mg) by mouth daily 90 tablet 3     amLODIPine (NORVASC) 5 MG tablet 7.5 mg daily 135 tablet 3     RISEdronate (ACTONEL) 150 MG tablet Take 1 tablet (150 mg) by mouth every 30 days Take 60 minutes before am meal with 8 oz. Water. Do not lie down until after eating. 3 tablet 3     levOCARNitine (CARNITOR) 330 MG tablet Take by mouth 6 times daily       traMADol (ULTRAM) 50 MG tablet Take 100 mg by mouth       acetaminophen (TYLENOL) 500 MG tablet Take 500-1,000  mg by mouth every 4 hours as needed for mild pain       PREDNISONE PO Take 5 mg by mouth daily       Tiotropium Bromide Monohydrate (SPIRIVA HANDIHALER IN) Reported on 5/5/2017       clotrimazole-betamethasone (LOTRISONE) cream Apply topically 2 times daily 30 g 1     albuterol (PROAIR HFA, PROVENTIL HFA, VENTOLIN HFA) 108 (90 BASE) MCG/ACT inhaler Inhale 2 puffs into the lungs every 6 hours as needed for shortness of breath / dyspnea 1 Inhaler 5     albuterol (2.5 MG/3ML) 0.083% nebulizer solution Take 1 vial (2.5 mg) by nebulization every 6 hours as needed 100 mL 5     hydroxychloroquine (PLAQUENIL) 200 MG tablet Take 1 tablet by mouth 2 times daily. 60 tablet 1     aspirin 325 MG tablet Take 1 tablet by mouth daily. 90 tablet 3     doxycycline (VIBRAMYCIN) 100 MG capsule Take 1 capsule by mouth 2 times daily. 180 capsule 1     Glucosamine 500 MG CAPS Take 1 capful by mouth daily.           Social History   Substance Use Topics     Smoking status: Never Smoker     Smokeless tobacco: Never Used     Alcohol use No          Reviewed and updated as needed this visit by clinical staffTobacco  Allergies  Meds  Med Hx  Surg Hx  Fam Hx  Soc Hx      Reviewed and updated as needed this visit by Provider         ROS:  No fever, chills, stable dyspnea on exertion, no chest pain, palpitations, PND, orthopnea, edema, syncope, headache, abdominal pain     OBJECTIVE:     /68 (BP Location: Left arm, Patient Position: Sitting, Cuff Size: Adult Regular)  Pulse 80  Temp 97.8  F (36.6  C) (Oral)  Wt 205 lb 8 oz (93.2 kg)  SpO2 95%  BMI 42.95 kg/m2  Body mass index is 42.95 kg/(m^2).      Lungs: crackles bases, decreased air flow, few wheezes  CV: normal S1, S2 with 2/6 murmur    ASSESSMENT/PLAN:             1. Essential hypertension, benign  Controlled, renew meds  - atenolol (TENORMIN) 100 MG tablet; Take 1 tablet (100 mg) by mouth daily  Dispense: 90 tablet; Refill: 3  - losartan (COZAAR) 100 MG tablet; Take 1  tablet (100 mg) by mouth daily  Dispense: 90 tablet; Refill: 3  - amLODIPine (NORVASC) 5 MG tablet; 7.5 mg daily  Dispense: 135 tablet; Refill: 3  - Albumin Random Urine Quantitative with Creat Ratio    2. Rheumatoid arthritis involving multiple sites with positive rheumatoid factor (H)  Stable, continue plan per rheumatology    3. Non-Hodgkin's lymphoma, unspecified body region, unspecified non-Hodgkin lymphoma type (H)  Stable, per [facility]    4. Pulmonary interstitial fibrosis (H)  Gradually progressive but meds helping    5. Left knee pain, unspecified chronicity  Refer ortho  - ORTHO  REFERRAL    6. Osteopenia, unspecified location  Continue med  - RISEdronate (ACTONEL) 150 MG tablet; Take 1 tablet (150 mg) by mouth every 30 days Take 60 minutes before am meal with 8 oz. Water. Do not lie down until after eating.  Dispense: 3 tablet; Refill: 3        Yocasta Hernandez MD  [facility]    She will follow up with me in a year since she has full check up at [facility] again in spring.

## 2017-11-20 NOTE — NURSING NOTE
"Chief Complaint   Patient presents with     RECHECK     follow up BP, has labs from [facility] N-Aceyl-L-Cysteine       Initial /68 (BP Location: Left arm, Patient Position: Sitting, Cuff Size: Adult Regular)  Pulse 80  Temp 97.8  F (36.6  C) (Oral)  Wt 205 lb 8 oz (93.2 kg)  SpO2 95%  BMI 42.95 kg/m2 Estimated body mass index is 42.95 kg/(m^2) as calculated from the following:    Height as of 8/25/17: 4' 10\" (1.473 m).    Weight as of this encounter: 205 lb 8 oz (93.2 kg).  Medication Reconciliation: complete     Melonie Das, TOMASZ      "

## 2017-11-20 NOTE — MR AVS SNAPSHOT
After Visit Summary   11/20/2017    Nicolás Wyman    MRN: [id number]           Patient Information     Date Of Birth          1940        Visit Information        Provider Department      11/20/2017 3:20 PM Yocasta Hernandez MD [facility]        Today's Diagnoses     Essential hypertension, benign    -  1    Rheumatoid arthritis involving multiple sites with positive rheumatoid factor (H)        Non-Hodgkin's lymphoma, unspecified body region, unspecified non-Hodgkin lymphoma type (H)        Pulmonary interstitial fibrosis (H)        Left knee pain, unspecified chronicity        Osteopenia, unspecified location           Follow-ups after your visit        Additional Services     ORTHO  REFERRAL       [facility] is referring you to the Orthopedic  Services at [facility] [place].       The  Representative will assist you in the coordination of your Orthopedic and Musculoskeletal Care as prescribed by your physician.    The  Representative will call you within 1 business day to help schedule your appointment, or you may contact the  Representative at:    All areas ~ [phone]     Type of Referral : Surgical / Specialist  Dr. Parker again       Timeframe requested: 3 - 5 days    Coverage of these services is subject to the terms and limitations of your health insurance plan.  Please call member services at your health plan with any benefit or coverage questions.      If X-rays, CT or MRI's have been performed, please contact the facility where they were done to arrange for , prior to your scheduled appointment.  Please bring this referral request to your appointment and present it to your specialist.                  Who to contact     If you have questions or need follow up information about today's clinic visit or your schedule please contact [facility] directly at  "[phone].  Normal or non-critical lab and imaging results will be communicated to you by [employer], letter or phone within 4 business days after the clinic has received the results. If you do not hear from us within 7 days, please contact the clinic through [employer] or phone. If you have a critical or abnormal lab result, we will notify you by phone as soon as possible.  Submit refill requests through [employer] or call your pharmacy and they will forward the refill request to us. Please allow 3 business days for your refill to be completed.          Additional Information About Your Visit        [employer] Information     [employer] lets you send messages to your doctor, view your test results, renew your prescriptions, schedule appointments and more. To sign up, go to [email or web address] . Click on \"Log in\" on the left side of the screen, which will take you to the Welcome page. Then click on \"Sign up Now\" on the right side of the page.     You will be asked to enter the access code listed below, as well as some personal information. Please follow the directions to create your username and password.     Your access code is: [email or web address]  Expires: 2018  6:11 PM     Your access code will  in 90 days. If you need help or a new code, please call your [place] clinic or [phone].        Care EveryWhere ID     This is your Care EveryWhere ID. This could be used by other organizations to access your [place] medical records  [id number]        Your Vitals Were     Pulse Temperature Pulse Oximetry BMI (Body Mass Index)          80 97.8  F (36.6  C) (Oral) 95% 42.95 kg/m2         Blood Pressure from Last 3 Encounters:   17 120/68   17 120/60   17 128/68    Weight from Last 3 Encounters:   17 205 lb 8 oz (93.2 kg)   17 201 lb (91.2 kg)   17 200 lb 14.4 oz (91.1 kg)              We Performed the Following     Albumin Random Urine Quantitative with Creat Ratio     ORTHO "  REFERRAL          Today's Medication Changes          These changes are accurate as of: 11/20/17  6:11 PM.  If you have any questions, ask your nurse or doctor.               These medicines have changed or have updated prescriptions.        Dose/Directions    amLODIPine 5 MG tablet   Commonly known as:  NORVASC   This may have changed:  See the new instructions.   Used for:  Essential hypertension, benign   Changed by:  Yocasta Hernandez MD        7.5 mg daily   Quantity:  135 tablet   Refills:  3       atenolol 100 MG tablet   Commonly known as:  TENORMIN   This may have changed:  See the new instructions.   Used for:  Essential hypertension, benign   Changed by:  Yocasta Hernandez MD        Dose:  100 mg   Take 1 tablet (100 mg) by mouth daily   Quantity:  90 tablet   Refills:  3       levOCARNitine 330 MG tablet   Commonly known as:  CARNITOR   This may have changed:    - how to take this  - when to take this  - additional instructions   Changed by:  Yocasta Hernandez MD        3 tabs bid   Refills:  0         Stop taking these medicines if you haven't already. Please contact your care team if you have questions.     dexamethasone 4 MG/ML injection   Commonly known as:  DECADRON   Stopped by:  Yocasta Hernandez MD                Where to get your medicines      These medications were sent to [facility] [address] [place] [id number]     Phone:  [phone]     amLODIPine 5 MG tablet    atenolol 100 MG tablet    losartan 100 MG tablet    RISEdronate 150 MG tablet                Primary Care Provider Office Phone # Fax #    Yocasta Hernandez [phone]       [address] [facility] [address]  [facility] [address]        Equal Access to Services     [facility]: Hadii kamini bowles hadasho Soomaali, waaxda luqadaha, qaybta kaalmaandrea ruggiero . So [facility] [phone].    ATENCIÓN: Si jayjay dotson alexandre  disposición servicios gratuitos de asistencia lingüística. Donna conley [phone].    We comply with applicable federal civil rights laws and [place] laws. We do not discriminate on the basis of race, color, national origin, age, disability, sex, sexual orientation, or gender identity.            Thank you!     Thank you for choosing [facility]  for your care. Our goal is always to provide you with excellent care. Hearing back from our patients is one way we can continue to improve our services. Please take a few minutes to complete the written survey that you may receive in the mail after your visit with us. Thank you!             Your Updated Medication List - Protect others around you: Learn how to safely use, store and throw away your medicines at www.disposemymeds.org.          This list is accurate as of: 11/20/17  6:11 PM.  Always use your most recent med list.                   Brand Name Dispense Instructions for use Diagnosis    acetylcysteine 600 MG Caps capsule    N-ACETYL CYSTEINE     Take 1 capsule (600 mg) by mouth daily        * albuterol (2.5 MG/3ML) 0.083% neb solution     100 mL    Take 1 vial (2.5 mg) by nebulization every 6 hours as needed    Bronchitis with bronchospasm       * albuterol 108 (90 BASE) MCG/ACT Inhaler    PROAIR HFA/PROVENTIL HFA/VENTOLIN HFA    1 Inhaler    Inhale 2 puffs into the lungs every 6 hours as needed for shortness of breath / dyspnea    Cough       amLODIPine 5 MG tablet    NORVASC    135 tablet    7.5 mg daily    Essential hypertension, benign       aspirin 325 MG tablet     90 tablet    Take 1 tablet by mouth daily.        atenolol 100 MG tablet    TENORMIN    90 tablet    Take 1 tablet (100 mg) by mouth daily    Essential hypertension, benign       clotrimazole-betamethasone cream    LOTRISONE    30 g    Apply topically 2 times daily    Yeast infection of the skin       doxycycline 100 MG capsule    VIBRAMYCIN    180 capsule    Take 1 capsule by  mouth 2 times daily.    Septic arthritis (H)       glucosamine 500 MG Caps      Take 1 capful by mouth daily.        levOCARNitine 330 MG tablet    CARNITOR     3 tabs bid        losartan 100 MG tablet    COZAAR    90 tablet    Take 1 tablet (100 mg) by mouth daily    Essential hypertension, benign       PLAQUENIL 200 MG tablet   Generic drug:  hydroxychloroquine     60 tablet    Take 1 tablet by mouth 2 times daily.        PREDNISONE PO      Take 5 mg by mouth daily        RISEdronate 150 MG tablet    ACTONEL    3 tablet    Take 1 tablet (150 mg) by mouth every 30 days Take 60 minutes before am meal with 8 oz. Water. Do not lie down until after eating.    Osteopenia, unspecified location       SPIRIVA HANDIHALER IN      Reported on 5/5/2017        traMADol 50 MG tablet    ULTRAM     Take 100 mg by mouth        TYLENOL 500 MG tablet   Generic drug:  acetaminophen      Take 500-1,000 mg by mouth every 4 hours as needed for mild pain        * Notice:  This list has 2 medication(s) that are the same as other medications prescribed for you. Read the directions carefully, and ask your doctor or other care provider to review them with you.

## 2017-11-21 LAB
CREAT UR-MCNC: 59 MG/DL
MICROALBUMIN UR-MCNC: 5 MG/L
MICROALBUMIN/CREAT UR: 8.87 MG/G CR (ref 0–25)

## 2017-12-08 ENCOUNTER — TELEPHONE (OUTPATIENT)
Dept: INTERNAL MEDICINE | Facility: CLINIC | Age: 77
End: 2017-12-08

## 2017-12-08 NOTE — TELEPHONE ENCOUNTER
Pt called. Stated she saw David on 11/20 and pt thought David was going to order an MRI of her L knee, but pt stated no one has called her yet. Relayed I dont see that an MRI was ordered, but relayed it looks like Dr Hernandez referred her to ortho. Pt wanted me to check with David regarding MRI?

## 2017-12-11 NOTE — TELEPHONE ENCOUNTER
Spoke to patient- gave her Ortho's number and relayed Dr Hernandez's message. Pt will call them to make an appt.

## 2017-12-15 ENCOUNTER — OFFICE VISIT (OUTPATIENT)
Dept: ORTHOPEDICS | Facility: CLINIC | Age: 77
End: 2017-12-15
Payer: COMMERCIAL

## 2017-12-15 VITALS
DIASTOLIC BLOOD PRESSURE: 68 MMHG | HEIGHT: 58 IN | SYSTOLIC BLOOD PRESSURE: 112 MMHG | WEIGHT: 205 LBS | BODY MASS INDEX: 43.03 KG/M2

## 2017-12-15 DIAGNOSIS — M17.10 PRIMARY OSTEOARTHRITIS OF KNEE, UNSPECIFIED LATERALITY: Primary | ICD-10-CM

## 2017-12-15 PROCEDURE — 99213 OFFICE O/P EST LOW 20 MIN: CPT | Performed by: ORTHOPAEDIC SURGERY

## 2017-12-15 NOTE — LETTER
"    12/15/2017         RE: Nicolás Wyman  [address] [place] [address]        Dear Colleague,    Thank you for referring your patient, Nicolás Wyman, to the [facility] ORTHOPEDIC SURGERY. Please see a copy of my visit note below.    HISTORY OF PRESENT ILLNESS:    Nicolás Wyman is a 77 year old female who is seen in follow up for left knee pain.  Present symptoms: Pt states she has not gotten any relief with the cortisone injections, the injection done in October was US guided, done at the [facility].  Pt states pain in primarily in the anterior knee.  Pt states knee aches  Treatments tried to this point: cortisone injection - 8/25/2017 & 10/17/2017, prednisone, tramadol, extra strength tylenol with moderate relief    PHYSICAL EXAM:  /68 (BP Location: Right arm, Patient Position: Sitting, Cuff Size: Adult Regular)  Ht 4' 10\" (1.473 m)  Wt 205 lb (93 kg)  BMI 42.85 kg/m2  Body mass index is 42.85 kg/(m^2).   GENERAL APPEARANCE: healthy, alert and no distress   SKIN: no suspicious lesions or rashes  NEURO: Normal strength and tone, mentation intact and speech normal  VASCULAR:  good pulses, and cappillary refill   LYMPH: no lymphadenopathy   PSYCH:  mentation appears normal and affect normal/bright    MSK:  The patient ambulates without an antalgic gait.  The patient is able to get on and off the exam table without difficulty.      Examination of the spine reveals a normal lordosis to the cervical and lumbar spine, and a normal kyphosis to the thoracic spine.  There is no clinical evidence of scoliosis.    The pelvis is clinically level.  Trendelenberg is negative.  The patient is non-tender to palpation over the greater trochanteric bursal region or piriformis fossa.  With the hip flexed to 90 degrees, internal and external rotation is 30/60 respectively,  with no pain .      KNEE: Examination of the left knee reveals the lower extremity to have a Normal anatomic alignment.  There is " no erythema, ecchymosis nor edema.  There is no effusion present clinically.  There is no significant warmth.  Range of motion is 0 to 120 degrees, without crepitus.  There is mild tenderness to palpation at the both medial and lateral joint line.  Iraida's is negative without crepitus. The knee is ligamentously stable. Patello-femoral grind test is positive.      The calves and thighs are symmetric, without atrophy and non-tender to palpation. Manuel's sign is negative, bilaterally.   CMS is intact to the toes.       IMAGING INTERPRETATION:       ASSESSMENT / PLAN: Primary osteoarthritis left knee. Unfortunately she is not getting significant symptomatically from intra-articular injections anymore. We discussed the potential risks as well as benefits of a total knee arthroplasty and she is going to take this into advisement.    Return to clinic p.r.n.    Gurpreet Parker MD  Dept. Orthopedic Surgery  [facility]       Disclaimer: This note consists of symbols derived from keyboarding, dictation and/or voice recognition software. As a result, there may be errors in the script that have gone undetected. Please consider this when interpreting information found in this chart.        Again, thank you for allowing me to participate in the care of your patient.        Sincerely,        Anurag Parker MD

## 2017-12-15 NOTE — MR AVS SNAPSHOT
"              After Visit Summary   12/15/2017    Nicolás Wyman    MRN: [id number]           Patient Information     Date Of Birth          1940        Visit Information        Provider Department      12/15/2017 9:20 AM Anurag Parker MD [facility] ORTHOPEDIC SURGERY        Today's Diagnoses     Primary osteoarthritis of knee, unspecified laterality    -  1       Follow-ups after your visit        Who to contact     If you have questions or need follow up information about today's clinic visit or your schedule please contact [facility] ORTHOPEDIC SURGERY directly at [phone].  Normal or non-critical lab and imaging results will be communicated to you by [employer], letter or phone within 4 business days after the clinic has received the results. If you do not hear from us within 7 days, please contact the clinic through [employer] or phone. If you have a critical or abnormal lab result, we will notify you by phone as soon as possible.  Submit refill requests through Get Together or call your pharmacy and they will forward the refill request to us. Please allow 3 business days for your refill to be completed.          Additional Information About Your Visit        MyChart Information     Get Together lets you send messages to your doctor, view your test results, renew your prescriptions, schedule appointments and more. To sign up, go to [email or web address] . Click on \"Log in\" on the left side of the screen, which will take you to the Welcome page. Then click on \"Sign up Now\" on the right side of the page.     You will be asked to enter the access code listed below, as well as some personal information. Please follow the directions to create your username and password.     Your access code is: [email or web address]  Expires: 2018  6:11 PM     Your access code will  in 90 days. If you need help or a new code, please call your [place] clinic or [phone].        Care EveryWhere ID     This is " "your Care EveryWhere ID. This could be used by other organizations to access your [facility] medical records  [id number]        Your Vitals Were     Height BMI (Body Mass Index)                4' 10\" (1.473 m) 42.85 kg/m2           Blood Pressure from Last 3 Encounters:   12/15/17 112/68   11/20/17 120/68   08/18/17 120/60    Weight from Last 3 Encounters:   12/15/17 205 lb (93 kg)   11/20/17 205 lb 8 oz (93.2 kg)   08/25/17 201 lb (91.2 kg)              Today, you had the following     No orders found for display       Primary Care Provider Office Phone # Fax #    Yocasta Hernandez [phone]       [address] [place] [id number]        Equal Access to Services     [facility]: Hadii kamini bowles hadasho Soomaali, waaxda luqadaha, qaybta kaalmada adeegyada, andrea hendrix . So [facility] [phone].    ATENCIÓN: Si habla español, tiene a alexandre disposición servicios gratuitos de asistencia lingüística. Donna al [phone].    We comply with applicable federal civil rights laws and [place] laws. We do not discriminate on the basis of race, color, national origin, age, disability, sex, sexual orientation, or gender identity.            Thank you!     Thank you for choosing [facility] ORTHOPEDIC SURGERY  for your care. Our goal is always to provide you with excellent care. Hearing back from our patients is one way we can continue to improve our services. Please take a few minutes to complete the written survey that you may receive in the mail after your visit with us. Thank you!             Your Updated Medication List - Protect others around you: Learn how to safely use, store and throw away your medicines at www.disposemymeds.org.          This list is accurate as of: 12/15/17 11:59 PM.  Always use your most recent med list.                   Brand Name Dispense Instructions for use Diagnosis    acetylcysteine 600 MG Caps capsule    N-ACETYL CYSTEINE     Take 1 " capsule (600 mg) by mouth daily        ADVIL PO           * albuterol (2.5 MG/3ML) 0.083% neb solution     100 mL    Take 1 vial (2.5 mg) by nebulization every 6 hours as needed    Bronchitis with bronchospasm       * albuterol 108 (90 BASE) MCG/ACT Inhaler    PROAIR HFA/PROVENTIL HFA/VENTOLIN HFA    1 Inhaler    Inhale 2 puffs into the lungs every 6 hours as needed for shortness of breath / dyspnea    Cough       amLODIPine 5 MG tablet    NORVASC    135 tablet    7.5 mg daily    Essential hypertension, benign       aspirin 325 MG tablet     90 tablet    Take 1 tablet by mouth daily.        atenolol 100 MG tablet    TENORMIN    90 tablet    Take 1 tablet (100 mg) by mouth daily    Essential hypertension, benign       clotrimazole-betamethasone cream    LOTRISONE    30 g    Apply topically 2 times daily    Yeast infection of the skin       doxycycline 100 MG capsule    VIBRAMYCIN    180 capsule    Take 1 capsule by mouth 2 times daily.    Septic arthritis (H)       glucosamine 500 MG Caps      Take 1 capful by mouth daily.        levOCARNitine 330 MG tablet    CARNITOR     3 tabs bid        losartan 100 MG tablet    COZAAR    90 tablet    Take 1 tablet (100 mg) by mouth daily    Essential hypertension, benign       PLAQUENIL 200 MG tablet   Generic drug:  hydroxychloroquine     60 tablet    Take 1 tablet by mouth 2 times daily.        PREDNISONE PO      Take 5 mg by mouth daily        RISEdronate 150 MG tablet    ACTONEL    3 tablet    Take 1 tablet (150 mg) by mouth every 30 days Take 60 minutes before am meal with 8 oz. Water. Do not lie down until after eating.    Osteopenia, unspecified location       SPIRIVA HANDIHALER IN      Reported on 5/5/2017        traMADol 50 MG tablet    ULTRAM     Take 100 mg by mouth        TYLENOL 500 MG tablet   Generic drug:  acetaminophen      Take 500-1,000 mg by mouth every 4 hours as needed for mild pain        * Notice:  This list has 2 medication(s) that are the same as other  medications prescribed for you. Read the directions carefully, and ask your doctor or other care provider to review them with you.

## 2017-12-15 NOTE — NURSING NOTE
"Chief Complaint   Patient presents with     RECHECK     Left knee pain       Initial /68 (BP Location: Right arm, Patient Position: Sitting, Cuff Size: Adult Regular)  Ht 4' 10\" (1.473 m)  Wt 205 lb (93 kg)  BMI 42.85 kg/m2 Estimated body mass index is 42.85 kg/(m^2) as calculated from the following:    Height as of this encounter: 4' 10\" (1.473 m).    Weight as of this encounter: 205 lb (93 kg).  Medication Reconciliation: complete    "

## 2017-12-15 NOTE — PROGRESS NOTES
"HISTORY OF PRESENT ILLNESS:    Nicolás Wyman is a 77 year old female who is seen in follow up for left knee pain.  Present symptoms: Pt states she has not gotten any relief with the cortisone injections, the injection done in October was US guided, done at the [facility].  Pt states pain in primarily in the anterior knee.  Pt states knee aches  Treatments tried to this point: cortisone injection - 8/25/2017 & 10/17/2017, prednisone, tramadol, extra strength tylenol with moderate relief    PHYSICAL EXAM:  /68 (BP Location: Right arm, Patient Position: Sitting, Cuff Size: Adult Regular)  Ht 4' 10\" (1.473 m)  Wt 205 lb (93 kg)  BMI 42.85 kg/m2  Body mass index is 42.85 kg/(m^2).   GENERAL APPEARANCE: healthy, alert and no distress   SKIN: no suspicious lesions or rashes  NEURO: Normal strength and tone, mentation intact and speech normal  VASCULAR:  good pulses, and cappillary refill   LYMPH: no lymphadenopathy   PSYCH:  mentation appears normal and affect normal/bright    MSK:  The patient ambulates without an antalgic gait.  The patient is able to get on and off the exam table without difficulty.      Examination of the spine reveals a normal lordosis to the cervical and lumbar spine, and a normal kyphosis to the thoracic spine.  There is no clinical evidence of scoliosis.    The pelvis is clinically level.  Trendelenberg is negative.  The patient is non-tender to palpation over the greater trochanteric bursal region or piriformis fossa.  With the hip flexed to 90 degrees, internal and external rotation is 30/60 respectively,  with no pain .      KNEE: Examination of the left knee reveals the lower extremity to have a Normal anatomic alignment.  There is no erythema, ecchymosis nor edema.  There is no effusion present clinically.  There is no significant warmth.  Range of motion is 0 to 120 degrees, without crepitus.  There is mild tenderness to palpation at the both medial and lateral joint line.  " rIaida's is negative without crepitus. The knee is ligamentously stable. Patello-femoral grind test is positive.      The calves and thighs are symmetric, without atrophy and non-tender to palpation. Manuel's sign is negative, bilaterally.   CMS is intact to the toes.       IMAGING INTERPRETATION:       ASSESSMENT / PLAN: Primary osteoarthritis left knee. Unfortunately she is not getting significant symptomatically from intra-articular injections anymore. We discussed the potential risks as well as benefits of a total knee arthroplasty and she is going to take this into advisement.    Return to clinic ppapo Parker MD  Dept. Orthopedic Surgery  [facility]       Disclaimer: This note consists of symbols derived from keyboarding, dictation and/or voice recognition software. As a result, there may be errors in the script that have gone undetected. Please consider this when interpreting information found in this chart.

## 2018-04-04 ENCOUNTER — TELEPHONE (OUTPATIENT)
Dept: INTERNAL MEDICINE | Facility: CLINIC | Age: 78
End: 2018-04-04

## 2018-04-04 DIAGNOSIS — M85.80 OSTEOPENIA, UNSPECIFIED LOCATION: Primary | ICD-10-CM

## 2018-04-04 NOTE — TELEPHONE ENCOUNTER
Pt calls, states when she takes the Risedronate that she has diarrhea for days after taking it. States she typically was taking med on the 4th of each month. Does not want to continue med so doesn't plan on taking this month's dose. She spoke with her pharmacist who recommended calling PCP to see if there is an alternative that would be appropriate. Pt states she thinks she's heard of an alternative that is taken nasally.     Please advise, thanks.

## 2018-04-05 NOTE — TELEPHONE ENCOUNTER
The nasal spray is not effective enough, we don't use it anymore except to help pain from spine fracture. Boniva is in the same family, monthly like Actonel but has a lower incidence of diarrhea. If that still causes problems, then once yearly infusion of reclast would be the next treatment option. If she is willing to try Boniva, I can send that. It may need PA.

## 2018-04-06 RX ORDER — IBANDRONATE SODIUM 150 MG/1
150 TABLET, FILM COATED ORAL
Qty: 3 TABLET | Refills: 3 | Status: SHIPPED | OUTPATIENT
Start: 2018-04-06 | End: 2018-11-15

## 2018-04-17 ENCOUNTER — TRANSFERRED RECORDS (OUTPATIENT)
Dept: HEALTH INFORMATION MANAGEMENT | Facility: CLINIC | Age: 78
End: 2018-04-17

## 2018-07-26 ENCOUNTER — TRANSFERRED RECORDS (OUTPATIENT)
Dept: HEALTH INFORMATION MANAGEMENT | Facility: CLINIC | Age: 78
End: 2018-07-26

## 2018-08-27 ENCOUNTER — TELEPHONE (OUTPATIENT)
Dept: INTERNAL MEDICINE | Facility: CLINIC | Age: 78
End: 2018-08-27

## 2018-08-27 NOTE — TELEPHONE ENCOUNTER
Reason for Call:  Other call back    Detailed comments: [facility]regarding pt and to confirm that Dr Nuñez follow pt with [facility].    Phone Number Patient can be reached at: Other phone number:  [phone]  Best Time any    Can we leave a detailed message on this number? YES    Call taken on 8/27/2018 at 4:00 PM by Rehana Busch

## 2018-08-27 NOTE — TELEPHONE ENCOUNTER
Fax received from [facility] for review and signature.  Put in Dr. Hernandez's in basket.

## 2018-08-28 ENCOUNTER — PATIENT OUTREACH (OUTPATIENT)
Dept: CARE COORDINATION | Facility: CLINIC | Age: 78
End: 2018-08-28

## 2018-08-28 ENCOUNTER — CARE COORDINATION (OUTPATIENT)
Dept: CARE COORDINATION | Facility: CLINIC | Age: 78
End: 2018-08-28

## 2018-08-28 NOTE — TELEPHONE ENCOUNTER
Advise them that I can manage any medical problems but the home care orders and further orders for physical therapy or anything related to ortho issues needs to go back to the orthopedist.

## 2018-08-28 NOTE — PROGRESS NOTES
D/C from [facility] on 8/25/2018 to [facility] home w/home care

## 2018-08-30 ENCOUNTER — TELEPHONE (OUTPATIENT)
Dept: INTERNAL MEDICINE | Facility: CLINIC | Age: 78
End: 2018-08-30

## 2018-08-30 NOTE — TELEPHONE ENCOUNTER
Fax received from [facility] for review and signature.  Put in Dr. Hernandez's in basket.

## 2018-08-30 NOTE — TELEPHONE ENCOUNTER
Fax received from [facility] for review and signature.  Put in Dr. Hernandez's in basket.

## 2018-08-31 NOTE — PROGRESS NOTES
Clinic Care Coordination Contact  Care Coordination Communication         Clinical Data: Patient is s/p TKA on 8/2. Procedure done at [facility].  Patient discharged to [facility] with discharge home with home care services on 8/28.    Home Care Contact:              Home Care Agency: [facility]              Contact name () and phone number: Janet [phone]              Care Coordination contacted home care: Yes              Anticipated start of care date: 8/31/18    Plan: RN/SW Care Coordinator will await notification from home care staff informing RN/SW Care Coordinator of patients discharge plans/needs. RN/SW Care Coordinator will review chart and outreach to home care every 4 weeks and as needed.      Lorena Musa RN-BSN   Care Coordination  Phone:  [phone]  Email: [email or web address]  [facility]

## 2018-09-05 ENCOUNTER — OFFICE VISIT (OUTPATIENT)
Dept: INTERNAL MEDICINE | Facility: CLINIC | Age: 78
End: 2018-09-05
Payer: COMMERCIAL

## 2018-09-05 VITALS
TEMPERATURE: 98.2 F | SYSTOLIC BLOOD PRESSURE: 100 MMHG | DIASTOLIC BLOOD PRESSURE: 70 MMHG | RESPIRATION RATE: 16 BRPM | HEART RATE: 72 BPM | BODY MASS INDEX: 42.11 KG/M2 | WEIGHT: 200.6 LBS | OXYGEN SATURATION: 96 % | HEIGHT: 58 IN

## 2018-09-05 DIAGNOSIS — I10 ESSENTIAL HYPERTENSION, BENIGN: ICD-10-CM

## 2018-09-05 DIAGNOSIS — Z23 NEED FOR PROPHYLACTIC VACCINATION AND INOCULATION AGAINST INFLUENZA: ICD-10-CM

## 2018-09-05 DIAGNOSIS — M17.12 PRIMARY OSTEOARTHRITIS OF LEFT KNEE: Primary | ICD-10-CM

## 2018-09-05 PROCEDURE — G0008 ADMIN INFLUENZA VIRUS VAC: HCPCS | Performed by: FAMILY MEDICINE

## 2018-09-05 PROCEDURE — 99214 OFFICE O/P EST MOD 30 MIN: CPT | Mod: 25 | Performed by: FAMILY MEDICINE

## 2018-09-05 PROCEDURE — 90662 IIV NO PRSV INCREASED AG IM: CPT | Performed by: FAMILY MEDICINE

## 2018-09-05 RX ORDER — PANTOPRAZOLE SODIUM 40 MG/1
40 TABLET, DELAYED RELEASE ORAL DAILY
Qty: 30 TABLET | Refills: 1 | Status: SHIPPED | OUTPATIENT
Start: 2018-09-05 | End: 2018-11-15

## 2018-09-05 RX ORDER — FERROUS SULFATE 325(65) MG
325 TABLET ORAL
Qty: 30 TABLET | Refills: 2 | COMMUNITY
Start: 2018-09-05 | End: 2021-01-01

## 2018-09-05 RX ORDER — LACTOBACILLUS ACIDOPHILUS 500MM CELL
CAPSULE ORAL
COMMUNITY
Start: 2018-09-05 | End: 2018-09-26

## 2018-09-05 RX ORDER — OXYCODONE HYDROCHLORIDE 5 MG/1
TABLET ORAL
Qty: 20 TABLET | Refills: 0 | Status: SHIPPED | OUTPATIENT
Start: 2018-09-05 | End: 2018-09-26

## 2018-09-05 RX ORDER — OXYCODONE HYDROCHLORIDE 5 MG/1
5 TABLET ORAL EVERY 6 HOURS PRN
Qty: 6 TABLET | Refills: 0 | COMMUNITY
Start: 2018-09-05 | End: 2018-09-05

## 2018-09-05 RX ORDER — LACTOBACILLUS ACIDOPHILUS 500MM CELL
1 CAPSULE ORAL DAILY
Qty: 30 CAPSULE | Refills: 5 | Status: SHIPPED | OUTPATIENT
Start: 2018-09-05 | End: 2020-12-17

## 2018-09-05 RX ORDER — VALSARTAN 160 MG/1
160 TABLET ORAL DAILY
Qty: 30 TABLET | Refills: 1 | COMMUNITY
Start: 2018-09-05 | End: 2018-09-26 | Stop reason: ALTCHOICE

## 2018-09-05 RX ORDER — ATORVASTATIN CALCIUM 40 MG/1
40 TABLET, FILM COATED ORAL DAILY
Qty: 30 TABLET | Refills: 1 | COMMUNITY
Start: 2018-09-05 | End: 2018-11-26

## 2018-09-05 RX ORDER — ATENOLOL 100 MG/1
50 TABLET ORAL DAILY
Qty: 90 TABLET | Refills: 3 | COMMUNITY
Start: 2018-09-05 | End: 2019-11-29

## 2018-09-05 RX ORDER — PANTOPRAZOLE SODIUM 40 MG/1
40 TABLET, DELAYED RELEASE ORAL DAILY
Qty: 30 TABLET | Refills: 1 | COMMUNITY
Start: 2018-09-05 | End: 2018-09-05

## 2018-09-05 NOTE — MR AVS SNAPSHOT
After Visit Summary   9/5/2018    Nicolás Wyman    MRN: [id number]           Patient Information     Date Of Birth          1940        Visit Information        Provider Department      9/5/2018 4:20 PM Geoffrey Menendez MD [facility]        Today's Diagnoses     Need for prophylactic vaccination and inoculation against influenza    -  1    Primary osteoarthritis of left knee        Essential hypertension, benign          Care Instructions      Decrease Atenolol to 1/2 tab.    See your doctor in 3 weeks.    PT referral.          Follow-ups after your visit        Additional Services     RATNA PT, HAND, AND CHIROPRACTIC REFERRAL       **This order will print in the [facility] Scheduling Office**    Physical Therapy, Hand Therapy and Chiropractic Care are available through:    *[facility]  *[facility]  *[facility]    Call one number to schedule at any of the above locations: [phone].    Your provider has referred you to: Physical Therapy at [facility] or [facility]    Indication/Reason for Referral: post L TKA  Onset of Illness:   Therapy Orders: continue post op PT, DOS 8-2-2018.  Special Programs: None  Special Request: Shawn Altman      Additional Comments for the Therapist or Chiropractor:     Please be aware that coverage of these services is subject to the terms and limitations of your health insurance plan.  Call member services at your health plan with any benefit or coverage questions.      Please bring the following to your appointment:    *Your personal calendar for scheduling future appointments  *Comfortable clothing                  Who to contact     If you have questions or need follow up information about today's clinic visit or your schedule please contact [facility] directly at [phone].  Normal or non-critical lab and imaging results will be communicated to you by MyChart, letter or phone  "within 4 business days after the clinic has received the results. If you do not hear from us within 7 days, please contact the clinic through [employer] or phone. If you have a critical or abnormal lab result, we will notify you by phone as soon as possible.  Submit refill requests through [employer] or call your pharmacy and they will forward the refill request to us. Please allow 3 business days for your refill to be completed.          Additional Information About Your Visit        Care EveryWhere ID     This is your Care EveryWhere ID. This could be used by other organizations to access your [facility] medical records  [id number]        Your Vitals Were     Pulse Temperature Respirations Height Pulse Oximetry Breastfeeding?    72 98.2  F (36.8  C) (Oral) 16 4' 10\" (1.473 m) 96% No    BMI (Body Mass Index)                   41.93 kg/m2            Blood Pressure from Last 3 Encounters:   09/05/18 100/70   12/15/17 112/68   11/20/17 120/68    Weight from Last 3 Encounters:   09/05/18 200 lb 9.6 oz (91 kg)   12/15/17 205 lb (93 kg)   11/20/17 205 lb 8 oz (93.2 kg)              We Performed the Following     ADMIN INFLUENZA (For MEDICARE Patients ONLY) []     FLU VACCINE, INCREASED ANTIGEN, PRESV FREE, AGE 65+ [92031]     RATNA PT, HAND, AND CHIROPRACTIC REFERRAL          Today's Medication Changes          These changes are accurate as of 9/5/18  4:56 PM.  If you have any questions, ask your nurse or doctor.               These medicines have changed or have updated prescriptions.        Dose/Directions    * Acidophilus Lactobacillus Caps   This may have changed:  Another medication with the same name was added. Make sure you understand how and when to take each.   Changed by:  Geoffrey Menendez MD        Refills:  0       * Acidophilus Lactobacillus Caps   This may have changed:  You were already taking a medication with the same name, and this prescription was added. Make sure you understand how and when to take " each.   Used for:  Primary osteoarthritis of left knee   Changed by:  Geoffrey Menendez MD        Dose:  1 capsule   Take 1 capsule by mouth daily   Quantity:  30 capsule   Refills:  5       aspirin 81 MG tablet   This may have changed:  Another medication with the same name was removed. Continue taking this medication, and follow the directions you see here.   Changed by:  Geoffrey Menendez MD        Dose:  81 mg   Take 1 tablet (81 mg) by mouth daily   Quantity:  30 tablet   Refills:  0       atenolol 100 MG tablet   Commonly known as:  TENORMIN   This may have changed:  how much to take   Used for:  Essential hypertension, benign   Changed by:  Geoffrey Menendez MD        Dose:  50 mg   Take 0.5 tablets (50 mg) by mouth daily   Quantity:  90 tablet   Refills:  3       * Notice:  This list has 2 medication(s) that are the same as other medications prescribed for you. Read the directions carefully, and ask your doctor or other care provider to review them with you.         Where to get your medicines      These medications were sent to [facility] [address] [place] [address] [facility]  [address] [facility] [address]     Phone:  [phone]     Acidophilus Lactobacillus Caps    pantoprazole 40 MG EC tablet               Information about OPIOIDS     PRESCRIPTION OPIOIDS: WHAT YOU NEED TO KNOW   We gave you an opioid (narcotic) pain medicine. It is important to manage your pain, but opioids are not always the best choice. You should first try all the other options your care team gave you. Take this medicine for as short a time (and as few doses) as possible.    Some activities can increase your pain, such as bandage changes or therapy sessions. It may help to take your pain medicine 30 to 60 minutes before these activities. Reduce your stress by getting enough sleep, working on hobbies you enjoy and practicing relaxation or meditation. Talk to your care team about ways to manage your  pain beyond prescription opioids.    These medicines have risks:    DO NOT drive when on new or higher doses of pain medicine. These medicines can affect your alertness and reaction times, and you could be arrested for driving under the influence (DUI). If you need to use opioids long-term, talk to your care team about driving.    DO NOT operate heavy machinery    DO NOT do any other dangerous activities while taking these medicines.    DO NOT drink any alcohol while taking these medicines.     If the opioid prescribed includes acetaminophen, DO NOT take with any other medicines that contain acetaminophen. Read all labels carefully. Look for the word  acetaminophen  or  Tylenol.  Ask your pharmacist if you have questions or are unsure.    You can get addicted to pain medicines, especially if you have a history of addiction (chemical, alcohol or substance dependence). Talk to your care team about ways to reduce this risk.    All opioids tend to cause constipation. Drink plenty of water and eat foods that have a lot of fiber, such as fruits, vegetables, prune juice, apple juice and high-fiber cereal. Take a laxative (Miralax, milk of magnesia, Colace, Senna) if you don t move your bowels at least every other day. Other side effects include upset stomach, sleepiness, dizziness, throwing up, tolerance (needing more of the medicine to have the same effect), physical dependence and slowed breathing.    Store your pills in a secure place, locked if possible. We will not replace any lost or stolen medicine. If you don t finish your medicine, please throw away (dispose) as directed by your pharmacist. The [place] Pollution Control Agency has more information about safe disposal: [email or web address]-[email or web address]         Primary Care Provider Office Phone # Fax #    Yocasta Hernandez [phone]       [address] [place] [address]        Equal Access to  Services     [facility]: Hadii kamini bowles yodityoung Eliazarali, waaxda luqadaha, qaybta kaalmada mirandaallisonaura, andrea hendrix . So [facility] [phone].    ATENCIÓN: Si maryla janeth, tiene a alexandre disposición servicios gratuitos de asistencia lingüística. Llame al [phone].    We comply with applicable federal civil rights laws and [place] laws. We do not discriminate on the basis of race, color, national origin, age, disability, sex, sexual orientation, or gender identity.            Thank you!     Thank you for choosing [facility]  for your care. Our goal is always to provide you with excellent care. Hearing back from our patients is one way we can continue to improve our services. Please take a few minutes to complete the written survey that you may receive in the mail after your visit with us. Thank you!             Your Updated Medication List - Protect others around you: Learn how to safely use, store and throw away your medicines at www.disposemymeds.org.          This list is accurate as of 9/5/18  4:56 PM.  Always use your most recent med list.                   Brand Name Dispense Instructions for use Diagnosis    acetylcysteine 600 MG Caps capsule    N-ACETYL CYSTEINE     Take 1 capsule (600 mg) by mouth daily        * Acidophilus Lactobacillus Caps           * Acidophilus Lactobacillus Caps     30 capsule    Take 1 capsule by mouth daily    Primary osteoarthritis of left knee       ADVIL PO           * albuterol (2.5 MG/3ML) 0.083% neb solution     100 mL    Take 1 vial (2.5 mg) by nebulization every 6 hours as needed    Bronchitis with bronchospasm       * albuterol 108 (90 Base) MCG/ACT inhaler    PROAIR HFA/PROVENTIL HFA/VENTOLIN HFA    1 Inhaler    Inhale 2 puffs into the lungs every 6 hours as needed for shortness of breath / dyspnea    Cough       amLODIPine 5 MG tablet    NORVASC    135 tablet    7.5 mg daily    Essential hypertension, benign       aspirin 81 MG  tablet     30 tablet    Take 1 tablet (81 mg) by mouth daily        atenolol 100 MG tablet    TENORMIN    90 tablet    Take 0.5 tablets (50 mg) by mouth daily    Essential hypertension, benign       Calcium + D3 600-200 MG-UNIT Tabs           cholecalciferol 1000 UNIT tablet    vitamin D3    100 tablet    Take 1 tablet (1,000 Units) by mouth daily        clotrimazole-betamethasone cream    LOTRISONE    30 g    Apply topically 2 times daily    Yeast infection of the skin       doxycycline 100 MG capsule    VIBRAMYCIN    180 capsule    Take 1 capsule by mouth 2 times daily.    Septic arthritis (H)       ferrous sulfate 325 (65 Fe) MG tablet    IRON    30 tablet    Take 1 tablet (325 mg) by mouth daily (with breakfast)        glucosamine 500 MG Caps      Take 1 capful by mouth daily.        IBANdronate 150 MG tablet    BONIVA    3 tablet    Take 1 tablet (150 mg) by mouth every 30 days Take 60 minutes before am meal with 8 oz. water. Remain upright for 60 minutes.    Osteopenia, unspecified location       levOCARNitine 330 MG tablet    CARNITOR     3 tabs bid        LIPITOR 40 MG tablet   Generic drug:  atorvastatin     30 tablet    Take 1 tablet (40 mg) by mouth daily        losartan 100 MG tablet    COZAAR    90 tablet    Take 1 tablet (100 mg) by mouth daily    Essential hypertension, benign       oxyCODONE IR 5 MG tablet    ROXICODONE    6 tablet    Take 1 tablet (5 mg) by mouth every 6 hours as needed for severe pain        pantoprazole 40 MG EC tablet    PROTONIX    30 tablet    Take 1 tablet (40 mg) by mouth daily Take 30-60 minutes before a meal.    Primary osteoarthritis of left knee       PLAQUENIL 200 MG tablet   Generic drug:  hydroxychloroquine     60 tablet    Take 1 tablet by mouth 2 times daily.        PREDNISONE PO      Take 5 mg by mouth daily        traMADol 50 MG tablet    ULTRAM     Take 100 mg by mouth        TYLENOL 500 MG tablet   Generic drug:  acetaminophen      Take 500-1,000 mg by mouth  every 4 hours as needed for mild pain        valsartan 160 MG tablet    DIOVAN    30 tablet    Take 1 tablet (160 mg) by mouth daily        * Notice:  This list has 4 medication(s) that are the same as other medications prescribed for you. Read the directions carefully, and ask your doctor or other care provider to review them with you.

## 2018-09-05 NOTE — PROGRESS NOTES
SUBJECTIVE:   Nicolás Wyman is a 78 year old female who presents to clinic today for the following health issues:      Hospital Follow-up Visit:    Hospital/Nursing Home/ Rehab Facility:  [facility]  Date of Admission: 8-2-2018 to [id number] & 8-5-2018 to 8-  Reason(s) for Admission: total left knee            Problems taking medications regularly:  None       Medication changes since discharge: None       Problems adhering to non-medication therapy:  None    Summary of hospitalization:  [facility] discharge summary reviewed  Discharge summary unavailable  Diagnostic Tests/Treatments reviewed.  Follow up needed: none  Other Healthcare Providers Involved in Patient s Care:         PT  Update since discharge: improved.     Post Discharge Medication Reconciliation: discharge medications reconciled, continue medications without change. Except cut Atenolol to 50 mg every day.  Plan of care communicated with patient and daughter     Coding guidelines for this visit:  Type of Medical   Decision Making Face-to-Face Visit       within 7 Days of discharge Face-to-Face Visit        within 14 days of discharge   Moderate Complexity [id number]   High Complexity [id number]            Patient had L TKA and them transitional care. Needs PT. Is ambulating w/o assistant. Has a walker. Occasionally takes Oxycodone post PT.Has a Tramadol Rx from [facility].    No wound problems or unusual pain.    Has h/o RA, Lymphoma.    Patient Active Problem List   Diagnosis     Essential hypertension, benign     Rheumatoid arthritis (HCC)     Disorder of bone and cartilage     CARDIOVASCULAR SCREENING; LDL GOAL LESS THAN 130     Advanced directives, counseling/discussion     Health Care Home     Gait disturbance     Non Hodgkin's lymphoma (H)     Pulmonary interstitial fibrosis (H)     JIGNESH (obstructive sleep apnea)     Morbid obesity, unspecified obesity type (H)     Current Outpatient Prescriptions   Medication Sig Dispense  Refill     acetaminophen (TYLENOL) 500 MG tablet Take 500-1,000 mg by mouth every 4 hours as needed for mild pain       acetylcysteine (N-ACETYL CYSTEINE) 600 MG CAPS capsule Take 1 capsule (600 mg) by mouth daily       Acidophilus Lactobacillus CAPS        Acidophilus Lactobacillus CAPS Take 1 capsule by mouth daily 30 capsule 5     albuterol (2.5 MG/3ML) 0.083% nebulizer solution Take 1 vial (2.5 mg) by nebulization every 6 hours as needed 100 mL 5     albuterol (PROAIR HFA, PROVENTIL HFA, VENTOLIN HFA) 108 (90 BASE) MCG/ACT inhaler Inhale 2 puffs into the lungs every 6 hours as needed for shortness of breath / dyspnea 1 Inhaler 5     amLODIPine (NORVASC) 5 MG tablet 7.5 mg daily 135 tablet 3     aspirin 81 MG tablet Take 1 tablet (81 mg) by mouth daily 30 tablet      atenolol (TENORMIN) 100 MG tablet Take 0.5 tablets (50 mg) by mouth daily 90 tablet 3     atorvastatin (LIPITOR) 40 MG tablet Take 1 tablet (40 mg) by mouth daily 30 tablet 1     Calcium Carb-Cholecalciferol (CALCIUM + D3) 600-200 MG-UNIT TABS        cholecalciferol (VITAMIN D3) 1000 UNIT tablet Take 1 tablet (1,000 Units) by mouth daily 100 tablet 3     clotrimazole-betamethasone (LOTRISONE) cream Apply topically 2 times daily 30 g 1     doxycycline (VIBRAMYCIN) 100 MG capsule Take 1 capsule by mouth 2 times daily. 180 capsule 1     ferrous sulfate (IRON) 325 (65 Fe) MG tablet Take 1 tablet (325 mg) by mouth daily (with breakfast) 30 tablet 2     Glucosamine 500 MG CAPS Take 1 capful by mouth daily.       hydroxychloroquine (PLAQUENIL) 200 MG tablet Take 1 tablet by mouth 2 times daily. 60 tablet 1     IBANdronate (BONIVA) 150 MG tablet Take 1 tablet (150 mg) by mouth every 30 days Take 60 minutes before am meal with 8 oz. water. Remain upright for 60 minutes. 3 tablet 3     Ibuprofen (ADVIL PO)        levOCARNitine (CARNITOR) 330 MG tablet 3 tabs bid       oxyCODONE IR (ROXICODONE) 5 MG tablet One daily or twice daily if needed for pain. 20 tablet  "0     pantoprazole (PROTONIX) 40 MG EC tablet Take 1 tablet (40 mg) by mouth daily Take 30-60 minutes before a meal. 30 tablet 1     PREDNISONE PO Take 5 mg by mouth daily       traMADol (ULTRAM) 50 MG tablet Take 100 mg by mouth       valsartan (DIOVAN) 160 MG tablet Take 1 tablet (160 mg) by mouth daily 30 tablet 1     losartan (COZAAR) 100 MG tablet Take 1 tablet (100 mg) by mouth daily 90 tablet 3     [DISCONTINUED] atenolol (TENORMIN) 100 MG tablet Take 1 tablet (100 mg) by mouth daily 90 tablet 3       REVIEW OF SYSTEMS    No fever  No sob  No CP  No nausea or abd pain  No edema      Past Medical History:   Diagnosis Date     Anesthesia complication     hypoxia postop     Discoid lupus      Disorder of bone and cartilage, unspecified      Essential hypertension, benign      Glaucoma      Idiopathic interstitial pneumonia 11/02    Pulmonary fibrosis, hypoxia postop     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 No  BMI 41.93 kg/m2    Alert  Non labored resp  CCV RSR  Abd non distended  L knee - incision healing well, no effusion or warmth, 0-100 degree ROM  Ambulates independently.      (M17.12) Primary osteoarthritis of left knee  (primary encounter diagnosis)  Comment: plus R  Plan: pantoprazole (PROTONIX) 40 MG EC tablet,         Acidophilus Lactobacillus CAPS, RATNA PT, HAND,         AND CHIROPRACTIC REFERRAL, oxyCODONE IR         (ROXICODONE) 5 MG tablet            (Z23) Need for prophylactic vaccination and inoculation against influenza  Comment:   Plan: FLU VACCINE, INCREASED " ANTIGEN, PRESV FREE, AGE        65+ [93541], ADMIN INFLUENZA (For MEDICARE         Patients ONLY) []            (I10) Essential hypertension, benign  Comment:   Lowish BP noted.  Plan: atenolol (TENORMIN) 100 MG tablet              Decrease Atenolol to 1/2 tab.    See your doctor in 3 weeks.    PT referral.            Injectable Influenza Immunization Documentation    1.  Is the person to be vaccinated sick today?   No    2. Does the person to be vaccinated have an allergy to a component   of the vaccine?   No  Egg Allergy Algorithm Link    3. Has the person to be vaccinated ever had a serious reaction   to influenza vaccine in the past?   No    4. Has the person to be vaccinated ever had Guillain-Barré syndrome?   No    Form completed by VON Wilde LPN

## 2018-09-11 ENCOUNTER — TELEPHONE (OUTPATIENT)
Dept: INTERNAL MEDICINE | Facility: CLINIC | Age: 78
End: 2018-09-11

## 2018-09-11 NOTE — TELEPHONE ENCOUNTER
Fax received from [facility] for review and signature.  Put in Dr. Hernandez's in basket.

## 2018-09-12 ENCOUNTER — TELEPHONE (OUTPATIENT)
Dept: INTERNAL MEDICINE | Facility: CLINIC | Age: 78
End: 2018-09-12

## 2018-09-12 NOTE — TELEPHONE ENCOUNTER
Fax received from [facility] for review and signature.  Put in Dr. Hernandez's in basket.

## 2018-09-13 ENCOUNTER — TELEPHONE (OUTPATIENT)
Dept: INTERNAL MEDICINE | Facility: CLINIC | Age: 78
End: 2018-09-13

## 2018-09-13 NOTE — TELEPHONE ENCOUNTER
Fax received from [facility] for review and signature.  Put in Dr. Hernandez's in basket.

## 2018-09-21 ENCOUNTER — THERAPY VISIT (OUTPATIENT)
Dept: PHYSICAL THERAPY | Facility: CLINIC | Age: 78
End: 2018-09-21
Payer: COMMERCIAL

## 2018-09-21 DIAGNOSIS — Z47.1 AFTERCARE FOLLOWING LEFT KNEE JOINT REPLACEMENT SURGERY: Primary | ICD-10-CM

## 2018-09-21 DIAGNOSIS — Z96.652 PRESENCE OF LEFT ARTIFICIAL KNEE JOINT: ICD-10-CM

## 2018-09-21 DIAGNOSIS — Z96.652 AFTERCARE FOLLOWING LEFT KNEE JOINT REPLACEMENT SURGERY: Primary | ICD-10-CM

## 2018-09-21 PROCEDURE — 97110 THERAPEUTIC EXERCISES: CPT | Mod: GP | Performed by: PHYSICAL THERAPIST

## 2018-09-21 PROCEDURE — G8978 MOBILITY CURRENT STATUS: HCPCS | Mod: GP | Performed by: PHYSICAL THERAPIST

## 2018-09-21 PROCEDURE — G8979 MOBILITY GOAL STATUS: HCPCS | Mod: GP | Performed by: PHYSICAL THERAPIST

## 2018-09-21 PROCEDURE — 97161 PT EVAL LOW COMPLEX 20 MIN: CPT | Mod: GP | Performed by: PHYSICAL THERAPIST

## 2018-09-21 ASSESSMENT — ACTIVITIES OF DAILY LIVING (ADL)
LIMPING: THE SYMPTOM AFFECTS MY ACTIVITY SLIGHTLY
SIT WITH YOUR KNEE BENT: ACTIVITY IS MINIMALLY DIFFICULT
STAND: ACTIVITY IS NOT DIFFICULT
RISE FROM A CHAIR: ACTIVITY IS NOT DIFFICULT
KNEEL ON THE FRONT OF YOUR KNEE: ACTIVITY IS FAIRLY DIFFICULT
HOW_WOULD_YOU_RATE_THE_OVERALL_FUNCTION_OF_YOUR_KNEE_DURING_YOUR_USUAL_DAILY_ACTIVITIES?: NEARLY NORMAL
KNEE_ACTIVITY_OF_DAILY_LIVING_SUM: 54
SQUAT: ACTIVITY IS MINIMALLY DIFFICULT
RAW_SCORE: 54
STIFFNESS: THE SYMPTOM AFFECTS MY ACTIVITY SLIGHTLY
WALK: ACTIVITY IS NOT DIFFICULT
WEAKNESS: I HAVE THE SYMPTOM BUT IT DOES NOT AFFECT MY ACTIVITY
SWELLING: THE SYMPTOM AFFECTS MY ACTIVITY SLIGHTLY
GIVING WAY, BUCKLING OR SHIFTING OF KNEE: I DO NOT HAVE THE SYMPTOM
KNEE_ACTIVITY_OF_DAILY_LIVING_SCORE: 77.14
AS_A_RESULT_OF_YOUR_KNEE_INJURY,_HOW_WOULD_YOU_RATE_YOUR_CURRENT_LEVEL_OF_DAILY_ACTIVITY?: NEARLY NORMAL
HOW_WOULD_YOU_RATE_THE_CURRENT_FUNCTION_OF_YOUR_KNEE_DURING_YOUR_USUAL_DAILY_ACTIVITIES_ON_A_SCALE_FROM_0_TO_100_WITH_100_BEING_YOUR_LEVEL_OF_KNEE_FUNCTION_PRIOR_TO_YOUR_INJURY_AND_0_BEING_THE_INABILITY_TO_PERFORM_ANY_OF_YOUR_USUAL_DAILY_ACTIVITIES?: 75
PAIN: THE SYMPTOM AFFECTS MY ACTIVITY SLIGHTLY
GO DOWN STAIRS: ACTIVITY IS MINIMALLY DIFFICULT
GO UP STAIRS: ACTIVITY IS MINIMALLY DIFFICULT

## 2018-09-21 NOTE — MR AVS SNAPSHOT
After Visit Summary   9/21/2018    Nicolás Wyman    MRN: [id number]           Patient Information     Date Of Birth          1940        Visit Information        Provider Department      9/21/2018 4:00 PM Filomena Iglesias, PT [facility] Physical [facility]        Today's Diagnoses     Aftercare following left knee joint replacement surgery    -  1    Presence of left artificial knee joint           Follow-ups after your visit        Your next 10 appointments already scheduled     Sep 24, 2018  8:50 AM CDT   RATNA Extremity with Filomena Iglesias, PT   [place] [facility] ([facility])    [address]  [facility] [address]   [phone]            Sep 26, 2018  4:20 PM CDT   SHORT with Geoffrey Menendez MD   [facility] ([facility])    [address] [place]  [facility] [address]   [phone]            Sep 27, 2018  3:00 PM CDT   RATNA Extremity with Amrik Griffin PT   [place] [facility] ([facility])    [address]  [facility] [address]   [phone]            Oct 01, 2018  4:40 PM CDT   RATNA Extremity with Filomena Iglesias PT   [place] [facility] ([facility])    [address]  [facility] [address]   [phone]            Oct 04, 2018  9:30 AM CDT   RATNA Extremity with Filomena Iglesias PT   [place] [facility] ([facility])    [address]  [facility] [address]   [phone]            Oct 08, 2018 10:10 AM CDT   RATNA Extremity with Filomena Iglesias PT   [place] [facility] ([facility])    [address]  [facility] [address]   [phone]            Oct 11, 2018  9:30 AM CDT   RATNA Extremity with Filomena Iglesias, PT    [place] [facility] ([facility] [place])    [address]  [facility] [address] [phone]              Who to contact     If you have questions or need follow up information about today's clinic visit or your schedule please contact [facility] PHYSICAL THERAPY directly at [phone].  Normal or non-critical lab and imaging results will be communicated to you by MyChart, letter or phone within 4 business days after the clinic has received the results. If you do not hear from us within 7 days, please contact the clinic through MyChart or phone. If you have a critical or abnormal lab result, we will notify you by phone as soon as possible.  Submit refill requests through [employer] or call your pharmacy and they will forward the refill request to us. Please allow 3 business days for your refill to be completed.          Additional Information About Your Visit        Care EveryWhere ID     This is your Care EveryWhere ID. This could be used by other organizations to access your [place] medical records  [id number]         Blood Pressure from Last 3 Encounters:   09/05/18 100/70   12/15/17 112/68   11/20/17 120/68    Weight from Last 3 Encounters:   09/05/18 91 kg (200 lb 9.6 oz)   12/15/17 93 kg (205 lb)   11/20/17 93.2 kg (205 lb 8 oz)              We Performed the Following     HC PT EVAL, LOW COMPLEXITY     RATNA INITIAL EVAL REPORT     THERAPEUTIC EXERCISES        Primary Care Provider Office Phone # Fax #    Yocasta Hernandez [phone]       [address] [facility] [address]  [facility] [address]        Equal Access to Services     [facility] : Hadii aad ku hadasho Soomaali, waaxda luqadaha, qaybta kaalmada adeegyaaura, andrea khan. So [facility] [phone].    ATENCIÓN: Si habla español, tiene a alexandre disposición servicios gratuitos de asistencia lingüística. Donna conley [phone].    We  comply with applicable federal civil rights laws and [place] laws. We do not discriminate on the basis of race, color, national origin, age, disability, sex, sexual orientation, or gender identity.            Thank you!     Thank you for choosing [place] [facility] PHYSICAL [facility]  for your care. Our goal is always to provide you with excellent care. Hearing back from our patients is one way we can continue to improve our services. Please take a few minutes to complete the written survey that you may receive in the mail after your visit with us. Thank you!             Your Updated Medication List - Protect others around you: Learn how to safely use, store and throw away your medicines at www.disposemymeds.org.          This list is accurate as of 9/21/18  6:17 PM.  Always use your most recent med list.                   Brand Name Dispense Instructions for use Diagnosis    acetylcysteine 600 MG Caps capsule    N-ACETYL CYSTEINE     Take 1 capsule (600 mg) by mouth daily        * Acidophilus Lactobacillus Caps           * Acidophilus Lactobacillus Caps     30 capsule    Take 1 capsule by mouth daily    Primary osteoarthritis of left knee       ADVIL PO           * albuterol (2.5 MG/3ML) 0.083% neb solution     100 mL    Take 1 vial (2.5 mg) by nebulization every 6 hours as needed    Bronchitis with bronchospasm       * albuterol 108 (90 Base) MCG/ACT inhaler    PROAIR HFA/PROVENTIL HFA/VENTOLIN HFA    1 Inhaler    Inhale 2 puffs into the lungs every 6 hours as needed for shortness of breath / dyspnea    Cough       amLODIPine 5 MG tablet    NORVASC    135 tablet    7.5 mg daily    Essential hypertension, benign       aspirin 81 MG tablet     30 tablet    Take 1 tablet (81 mg) by mouth daily        atenolol 100 MG tablet    TENORMIN    90 tablet    Take 0.5 tablets (50 mg) by mouth daily    Essential hypertension, benign       Calcium + D3 600-200 MG-UNIT Tabs           cholecalciferol  1000 UNIT tablet    vitamin D3    100 tablet    Take 1 tablet (1,000 Units) by mouth daily        clotrimazole-betamethasone cream    LOTRISONE    30 g    Apply topically 2 times daily    Yeast infection of the skin       doxycycline 100 MG capsule    VIBRAMYCIN    180 capsule    Take 1 capsule by mouth 2 times daily.    Septic arthritis (H)       ferrous sulfate 325 (65 Fe) MG tablet    IRON    30 tablet    Take 1 tablet (325 mg) by mouth daily (with breakfast)        glucosamine 500 MG Caps      Take 1 capful by mouth daily.        IBANdronate 150 MG tablet    BONIVA    3 tablet    Take 1 tablet (150 mg) by mouth every 30 days Take 60 minutes before am meal with 8 oz. water. Remain upright for 60 minutes.    Osteopenia, unspecified location       levOCARNitine 330 MG tablet    CARNITOR     3 tabs bid        LIPITOR 40 MG tablet   Generic drug:  atorvastatin     30 tablet    Take 1 tablet (40 mg) by mouth daily        losartan 100 MG tablet    COZAAR    90 tablet    Take 1 tablet (100 mg) by mouth daily    Essential hypertension, benign       oxyCODONE IR 5 MG tablet    ROXICODONE    20 tablet    One daily or twice daily if needed for pain.    Primary osteoarthritis of left knee       pantoprazole 40 MG EC tablet    PROTONIX    30 tablet    Take 1 tablet (40 mg) by mouth daily Take 30-60 minutes before a meal.    Primary osteoarthritis of left knee       PLAQUENIL 200 MG tablet   Generic drug:  hydroxychloroquine     60 tablet    Take 1 tablet by mouth 2 times daily.        PREDNISONE PO      Take 5 mg by mouth daily        traMADol 50 MG tablet    ULTRAM     Take 100 mg by mouth        TYLENOL 500 MG tablet   Generic drug:  acetaminophen      Take 500-1,000 mg by mouth every 4 hours as needed for mild pain        valsartan 160 MG tablet    DIOVAN    30 tablet    Take 1 tablet (160 mg) by mouth daily        * Notice:  This list has 4 medication(s) that are the same as other medications prescribed for you. Read the  directions carefully, and ask your doctor or other care provider to review them with you.

## 2018-09-21 NOTE — PROGRESS NOTES
[place] [facility] Initial Evaluation  Subjective:  Patient is a 78 year old female presenting with rehab left knee hpi. The history is provided by the patient. No  was used.   Nicolás Wyman is a 78 year old female with a left knee condition.  Condition occurred with:  Degenerative joint disease.  Condition occurred: for unknown reasons.  This is a new condition  Patient has rheumatoid arthritis and has long standing left knee pain and DJD with a left total knee replacement performed on 8/2/18 at [facility]. She had a knee brace locked in full extension on her knee for 3 weeks after surgery. After 3 days at [facility] she went to a TCU from 8/5/18-8/20/18 and then she went home where she lives with her daughter and her family in a one level home with 2 steps with a railing into the house. She had home PT for 2 weeks. She is using Tramadol and Tylenol for the pain. She uses a rolling walker out of the house and does some walking in the house without an assistive device. She is currently not driving. Chief complaints are of intermittent left knee pain, swelling, stiffness, weakness and limited function. She had a R TKA in 2012 with post op infection and multiple revisions. .    Patient reports pain:  Anterior and in the joint.     and is intermittent and reported as 3/10.  Associated symptoms:  Loss of motion/stiffness, loss of strength and edema. Pain is worse during the day.  Symptoms are exacerbated by certain positions, ascending stairs, descending stairs, standing, weight bearing, walking and bending/squatting and relieved by rest, analgesics and ice.  Since onset symptoms are gradually improving.    Previous treatment includes physical therapy and surgery.  There was moderate improvement following previous treatment.  General health as reported by patient is good.  Pertinent medical history includes:  Cancer, high blood pressure and rheumatoid arthritis.  Medical allergies: no.  Other  surgeries include:  Orthopedic surgery (TKA B).  Current medications:  Meds to increase bone density, pain medication and high blood pressure medication.  Current occupation is retired.                                    Objective:    Gait:    Gait Type:  Antalgic   Assistive Devices:  Walker  Deviations:  Knee:  Knee extension decr L and knee flexion decr LGeneral Deviations:  Stance time decr, stride length decr and joseph decr                                                      Knee Evaluation:  ROM:  Strength wnl knee: B glut medius grade 4-/5, glut max grade 4+/5.  AROM    Hyperextension:  Left:  0    Right: 0  Extension:  Left: 4    Right:  0  Flexion: Left: 105    Right: 115        Strength:     Extension:  Left: 4+/5   Pain:      Right: 5-/5   Pain:  Flexion:  Left: 4+/5   Pain:      Right: 5-/5   Pain:    Quad Set Left: Fair    Pain:   Quad Set Right: Good    Pain:  Ligament Testing:  Not Assessed                Special Tests: Not Assessed      Palpation:    Left knee tenderness present at:  Incisional (incision is closed and well healed except on 1/2 scab just inferior to the patella. No drainage. Mild warmth and redness around anterior knee, but no signs of infection)    Edema:    Circumference:      Joint Line:  Left:  51 cm   Right:  48 cm    Mobility Testing:      Patellofemoral Medial:  Left: normal    Right: normal  Patellofemoral Lateral:  Left: normal    Right: normal  Patellofemoral Superior:  Left: normal    Right: normal  Patellofemoral Inferior:  Left: normal    Right: normal  Functional Testing:  : 4 inch forward step up x 10 on left with fair control.          Proprioception:   Stork Balance Test:  Left:  2 seconds  Right:  5 seconds  % of Uninvolved:           General     ROS    Assessment/Plan:    Patient is a 78 year old female with left side knee complaints.    Patient has the following significant findings with corresponding treatment plan.                Diagnosis 1:  S/p L TKA 8/2/18     Pain -  hot/cold therapy  Decreased ROM/flexibility - therapeutic exercise  Decreased strength - therapeutic exercise and therapeutic activities  Edema - cold therapy  Impaired gait - gait training  Impaired muscle performance - neuro re-education  Decreased function - therapeutic activities    Therapy Evaluation Codes:   1) History comprised of:   Personal factors that impact the plan of care:      None.    Comorbidity factors that impact the plan of care are:      Cancer, High blood pressure and Rheumatoid arthritis.     Medications impacting care: Bone density, High blood pressure and Pain.  2) Examination of Body Systems comprised of:   Body structures and functions that impact the plan of care:      Knee.   Activity limitations that impact the plan of care are:      Bathing, Cooking, Driving, Dressing, Sitting, Stairs, Standing, Walking and Sleeping.  3) Clinical presentation characteristics are:   Stable/Uncomplicated.  4) Decision-Making    Low complexity using standardized patient assessment instrument and/or measureable assessment of functional outcome.  Cumulative Therapy Evaluation is: Low complexity.    Previous and current functional limitations:  (See Goal Flow Sheet for this information)    Short term and Long term goals: (See Goal Flow Sheet for this information)     Communication ability:  Patient appears to be able to clearly communicate and understand verbal and written communication and follow directions correctly.  Treatment Explanation - The following has been discussed with the patient:   RX ordered/plan of care  Anticipated outcomes  Possible risks and side effects  This patient would benefit from PT intervention to resume normal activities.   Rehab potential is good.    Frequency:  2 X week, once daily  Duration:  for 8 weeks  Discharge Plan:  Achieve all LTG.  Independent in home treatment program.  Reach maximal therapeutic benefit.    Please refer to the daily flowsheet for treatment  today, total treatment time and time spent performing 1:1 timed codes.

## 2018-09-24 ENCOUNTER — THERAPY VISIT (OUTPATIENT)
Dept: PHYSICAL THERAPY | Facility: CLINIC | Age: 78
End: 2018-09-24
Payer: COMMERCIAL

## 2018-09-24 DIAGNOSIS — Z96.652 AFTERCARE FOLLOWING LEFT KNEE JOINT REPLACEMENT SURGERY: ICD-10-CM

## 2018-09-24 DIAGNOSIS — Z47.1 AFTERCARE FOLLOWING LEFT KNEE JOINT REPLACEMENT SURGERY: ICD-10-CM

## 2018-09-24 DIAGNOSIS — Z96.652 PRESENCE OF LEFT ARTIFICIAL KNEE JOINT: ICD-10-CM

## 2018-09-24 PROCEDURE — 97112 NEUROMUSCULAR REEDUCATION: CPT | Mod: GP | Performed by: PHYSICAL THERAPIST

## 2018-09-24 PROCEDURE — 97110 THERAPEUTIC EXERCISES: CPT | Mod: GP | Performed by: PHYSICAL THERAPIST

## 2018-09-26 ENCOUNTER — OFFICE VISIT (OUTPATIENT)
Dept: INTERNAL MEDICINE | Facility: CLINIC | Age: 78
End: 2018-09-26
Payer: COMMERCIAL

## 2018-09-26 VITALS
BODY MASS INDEX: 42.76 KG/M2 | DIASTOLIC BLOOD PRESSURE: 70 MMHG | TEMPERATURE: 98.6 F | RESPIRATION RATE: 18 BRPM | HEIGHT: 58 IN | HEART RATE: 88 BPM | SYSTOLIC BLOOD PRESSURE: 146 MMHG | OXYGEN SATURATION: 94 % | WEIGHT: 203.7 LBS

## 2018-09-26 DIAGNOSIS — M17.12 PRIMARY OSTEOARTHRITIS OF LEFT KNEE: Primary | ICD-10-CM

## 2018-09-26 DIAGNOSIS — I10 ESSENTIAL HYPERTENSION, BENIGN: ICD-10-CM

## 2018-09-26 DIAGNOSIS — M06.9 RHEUMATOID ARTHRITIS INVOLVING MULTIPLE SITES, UNSPECIFIED RHEUMATOID FACTOR PRESENCE: ICD-10-CM

## 2018-09-26 PROCEDURE — 99213 OFFICE O/P EST LOW 20 MIN: CPT | Performed by: FAMILY MEDICINE

## 2018-09-26 NOTE — MR AVS SNAPSHOT
After Visit Summary   9/26/2018    Nicolás Wyman    MRN: [id number]           Patient Information     Date Of Birth          1940        Visit Information        Provider Department      9/26/2018 4:20 PM Geoffrey Menendez MD [facility]        Today's Diagnoses     Primary osteoarthritis of left knee    -  1    Essential hypertension, benign        Rheumatoid arthritis involving multiple sites, unspecified rheumatoid factor presence (H)           Follow-ups after your visit        Your next 10 appointments already scheduled     Oct 01, 2018  4:40 PM CDT   RATNA Extremity with Filomena Iglesias, PT   [place] [facility] ([facility])    [address]  [place] [address]   [phone]            Oct 04, 2018  9:30 AM CDT   RATNA Extremity with Filomena Iglesias PT   [place] [facility] Physical Therapy ([facility])    [address]  [place] [address]   [phone]            Oct 08, 2018 10:10 AM CDT   RATNA Extremity with Filomena Iglesias PT   [place] [facility] ([facility])    [address]  [place] [address]   [phone]            Oct 11, 2018  9:30 AM CDT   RATNA Extremity with Filomena Iglesias PT   [place] [facility] ([facility])    [address]  [place] [address]   [phone]              Who to contact     If you have questions or need follow up information about today's clinic visit or your schedule please contact [facility] directly at [phone].  Normal or non-critical lab and imaging results will be communicated to you by MyChart, letter or phone within 4 business days after the clinic has received the results. If you do not hear from us within 7 days, please contact the clinic through MyChart or phone. If you have  "a critical or abnormal lab result, we will notify you by phone as soon as possible.  Submit refill requests through [employer] or call your pharmacy and they will forward the refill request to us. Please allow 3 business days for your refill to be completed.          Additional Information About Your Visit        Care EveryWhere ID     This is your Care EveryWhere ID. This could be used by other organizations to access your [place] medical records  [id number]        Your Vitals Were     Pulse Temperature Respirations Height Last Period Pulse Oximetry    88 98.6  F (37  C) (Oral) 18 4' 10\" (1.473 m) (LMP Unknown) 94%    Breastfeeding? BMI (Body Mass Index)                No 42.57 kg/m2           Blood Pressure from Last 3 Encounters:   09/26/18 146/70   09/05/18 100/70   12/15/17 112/68    Weight from Last 3 Encounters:   09/26/18 203 lb 11.2 oz (92.4 kg)   09/05/18 200 lb 9.6 oz (91 kg)   12/15/17 205 lb (93 kg)              Today, you had the following     No orders found for display         Today's Medication Changes          These changes are accurate as of 9/26/18  5:02 PM.  If you have any questions, ask your nurse or doctor.               Stop taking these medicines if you haven't already. Please contact your care team if you have questions.     valsartan 160 MG tablet   Commonly known as:  DIOVAN   Stopped by:  Geoffrey Menendez MD                    Primary Care Provider Office Phone # Fax #    Yocasta Hernandez [phone]       [address] [facility] [address]  [facility] [address]        Equal Access to Services     [facility]: Hadii kamini bowles hadasho Sojeanne, waaxda luqadaha, qaybta kaalmada andrea austin idiin hayaan adeeg kharash la'aan . So [facility] [phone].    ATENCIÓN: Si habla español, tiene a alexandre disposición servicios gratuitos de asistencia lingüística. Llame al [phone].    We comply with applicable federal civil rights laws and [place] laws. We do not discriminate on the basis of " race, color, national origin, age, disability, sex, sexual orientation, or gender identity.            Thank you!     Thank you for choosing [facility]  for your care. Our goal is always to provide you with excellent care. Hearing back from our patients is one way we can continue to improve our services. Please take a few minutes to complete the written survey that you may receive in the mail after your visit with us. Thank you!             Your Updated Medication List - Protect others around you: Learn how to safely use, store and throw away your medicines at www.disposemymeds.org.          This list is accurate as of 9/26/18  5:02 PM.  Always use your most recent med list.                   Brand Name Dispense Instructions for use Diagnosis    acetylcysteine 600 MG Caps capsule    N-ACETYL CYSTEINE     Take 1 capsule (600 mg) by mouth daily        Acidophilus Lactobacillus Caps     30 capsule    Take 1 capsule by mouth daily    Primary osteoarthritis of left knee       ADVIL PO           * albuterol (2.5 MG/3ML) 0.083% neb solution     100 mL    Take 1 vial (2.5 mg) by nebulization every 6 hours as needed    Bronchitis with bronchospasm       * albuterol 108 (90 Base) MCG/ACT inhaler    PROAIR HFA/PROVENTIL HFA/VENTOLIN HFA    1 Inhaler    Inhale 2 puffs into the lungs every 6 hours as needed for shortness of breath / dyspnea    Cough       amLODIPine 5 MG tablet    NORVASC    135 tablet    7.5 mg daily    Essential hypertension, benign       aspirin 81 MG tablet     30 tablet    Take 1 tablet (81 mg) by mouth daily        atenolol 100 MG tablet    TENORMIN    90 tablet    Take 0.5 tablets (50 mg) by mouth daily    Essential hypertension, benign       Calcium + D3 600-200 MG-UNIT Tabs           cholecalciferol 1000 UNIT tablet    vitamin D3    100 tablet    Take 1 tablet (1,000 Units) by mouth daily        doxycycline 100 MG capsule    VIBRAMYCIN    180 capsule    Take 1 capsule by mouth 2 times  daily.    Septic arthritis (H)       ferrous sulfate 325 (65 Fe) MG tablet    IRON    30 tablet    Take 1 tablet (325 mg) by mouth daily (with breakfast)        glucosamine 500 MG Caps      Take 1 capful by mouth daily.        IBANdronate 150 MG tablet    BONIVA    3 tablet    Take 1 tablet (150 mg) by mouth every 30 days Take 60 minutes before am meal with 8 oz. water. Remain upright for 60 minutes.    Osteopenia, unspecified location       levOCARNitine 330 MG tablet    CARNITOR     3 tabs bid        LIPITOR 40 MG tablet   Generic drug:  atorvastatin     30 tablet    Take 1 tablet (40 mg) by mouth daily        losartan 100 MG tablet    COZAAR    90 tablet    Take 1 tablet (100 mg) by mouth daily    Essential hypertension, benign       pantoprazole 40 MG EC tablet    PROTONIX    30 tablet    Take 1 tablet (40 mg) by mouth daily Take 30-60 minutes before a meal.    Primary osteoarthritis of left knee       PLAQUENIL 200 MG tablet   Generic drug:  hydroxychloroquine     60 tablet    Take 1 tablet by mouth 2 times daily.        PREDNISONE PO      Take 5 mg by mouth daily        traMADol 50 MG tablet    ULTRAM     Take 100 mg by mouth        TYLENOL 500 MG tablet   Generic drug:  acetaminophen      Take 500-1,000 mg by mouth every 4 hours as needed for mild pain        * Notice:  This list has 2 medication(s) that are the same as other medications prescribed for you. Read the directions carefully, and ask your doctor or other care provider to review them with you.

## 2018-09-26 NOTE — NURSING NOTE
"/70 (BP Location: Right arm, Patient Position: Sitting, Cuff Size: Adult Regular)  Pulse 88  Temp 98.6  F (37  C) (Oral)  Resp 18  Ht 4' 10\" (1.473 m)  Wt 203 lb 11.2 oz (92.4 kg)  LMP  (LMP Unknown)  SpO2 94%  Breastfeeding? No  BMI 42.57 kg/m2  Mónica Doherty CMA    "

## 2018-10-01 ENCOUNTER — THERAPY VISIT (OUTPATIENT)
Dept: PHYSICAL THERAPY | Facility: CLINIC | Age: 78
End: 2018-10-01
Payer: COMMERCIAL

## 2018-10-01 DIAGNOSIS — Z96.652 AFTERCARE FOLLOWING LEFT KNEE JOINT REPLACEMENT SURGERY: ICD-10-CM

## 2018-10-01 DIAGNOSIS — Z47.1 AFTERCARE FOLLOWING LEFT KNEE JOINT REPLACEMENT SURGERY: ICD-10-CM

## 2018-10-01 DIAGNOSIS — Z96.652 PRESENCE OF LEFT ARTIFICIAL KNEE JOINT: ICD-10-CM

## 2018-10-01 PROCEDURE — 97112 NEUROMUSCULAR REEDUCATION: CPT | Mod: GP | Performed by: PHYSICAL THERAPIST

## 2018-10-01 PROCEDURE — 97110 THERAPEUTIC EXERCISES: CPT | Mod: GP | Performed by: PHYSICAL THERAPIST

## 2018-10-01 NOTE — MR AVS SNAPSHOT
After Visit Summary   10/1/2018    Nicolás Wyman    MRN: [id number]           Patient Information     Date Of Birth          1940        Visit Information        Provider Department      10/1/2018 4:40 PM Filomena Iglesias PT [facility] Physical Therapy        Today's Diagnoses     Aftercare following left knee joint replacement surgery        Presence of left artificial knee joint           Follow-ups after your visit        Your next 10 appointments already scheduled     Oct 08, 2018 10:10 AM CDT   RATNA Extremity with Filomena Iglesias PT   [facility] Physical Therapy ([facility])    [address]  [facility] [id number] [phone]            Oct 15, 2018  8:50 AM CDT   RATNA Extremity with Filomena Iglesias PT   [facility] [employer] [facility] Physical Therapy ([facility])    [address]  [facility] [id number] [phone]              Who to contact     If you have questions or need follow up information about today's clinic visit or your schedule please contact [facility] PHYSICAL THERAPY directly at [phone].  Normal or non-critical lab and imaging results will be communicated to you by MyChart, letter or phone within 4 business days after the clinic has received the results. If you do not hear from us within 7 days, please contact the clinic through MyChart or phone. If you have a critical or abnormal lab result, we will notify you by phone as soon as possible.  Submit refill requests through [employer] or call your pharmacy and they will forward the refill request to us. Please allow 3 business days for your refill to be completed.          Additional Information About Your Visit        Care EveryWhere ID     This is your Care EveryWhere ID. This could be used by other organizations to access your [facility]  records  [id number]        Your Vitals Were     Last Period                   (LMP Unknown)            Blood Pressure from Last 3 Encounters:   09/26/18 146/70   09/05/18 100/70   12/15/17 112/68    Weight from Last 3 Encounters:   09/26/18 92.4 kg (203 lb 11.2 oz)   09/05/18 91 kg (200 lb 9.6 oz)   12/15/17 93 kg (205 lb)              We Performed the Following     NEUROMUSCULAR RE-EDUCATION     THERAPEUTIC EXERCISES        Primary Care Provider Office Phone # Fax #    Yocasta Hernandez [phone]       [address]sha [address]  [facility] [address]        Equal Access to Services     [facility]: Hadii kamini Santana, waaxda keith, qaybta kaalmada norman, andrea hendrix . So [facility] [phone].    ATENCIÓN: Si habla español, tiene a alexandre disposición servicios gratuitos de asistencia lingüística. Llame al [phone].    We comply with applicable federal civil rights laws and [place] laws. We do not discriminate on the basis of race, color, national origin, age, disability, sex, sexual orientation, or gender identity.            Thank you!     Thank you for choosing [facility] PHYSICAL THERAPY  for your care. Our goal is always to provide you with excellent care. Hearing back from our patients is one way we can continue to improve our services. Please take a few minutes to complete the written survey that you may receive in the mail after your visit with us. Thank you!             Your Updated Medication List - Protect others around you: Learn how to safely use, store and throw away your medicines at www.disposemymeds.org.          This list is accurate as of 10/1/18  5:33 PM.  Always use your most recent med list.                   Brand Name Dispense Instructions for use Diagnosis    acetylcysteine 600 MG Caps capsule    N-ACETYL CYSTEINE     Take 1 capsule (600 mg) by mouth daily        Acidophilus Lactobacillus Caps      30 capsule    Take 1 capsule by mouth daily    Primary osteoarthritis of left knee       ADVIL PO           * albuterol (2.5 MG/3ML) 0.083% neb solution     100 mL    Take 1 vial (2.5 mg) by nebulization every 6 hours as needed    Bronchitis with bronchospasm       * albuterol 108 (90 Base) MCG/ACT inhaler    PROAIR HFA/PROVENTIL HFA/VENTOLIN HFA    1 Inhaler    Inhale 2 puffs into the lungs every 6 hours as needed for shortness of breath / dyspnea    Cough       amLODIPine 5 MG tablet    NORVASC    135 tablet    7.5 mg daily    Essential hypertension, benign       aspirin 81 MG tablet     30 tablet    Take 1 tablet (81 mg) by mouth daily        atenolol 100 MG tablet    TENORMIN    90 tablet    Take 0.5 tablets (50 mg) by mouth daily    Essential hypertension, benign       Calcium + D3 600-200 MG-UNIT Tabs           cholecalciferol 1000 UNIT tablet    vitamin D3    100 tablet    Take 1 tablet (1,000 Units) by mouth daily        doxycycline 100 MG capsule    VIBRAMYCIN    180 capsule    Take 1 capsule by mouth 2 times daily.    Septic arthritis (H)       ferrous sulfate 325 (65 Fe) MG tablet    IRON    30 tablet    Take 1 tablet (325 mg) by mouth daily (with breakfast)        glucosamine 500 MG Caps      Take 1 capful by mouth daily.        IBANdronate 150 MG tablet    BONIVA    3 tablet    Take 1 tablet (150 mg) by mouth every 30 days Take 60 minutes before am meal with 8 oz. water. Remain upright for 60 minutes.    Osteopenia, unspecified location       levOCARNitine 330 MG tablet    CARNITOR     3 tabs bid        LIPITOR 40 MG tablet   Generic drug:  atorvastatin     30 tablet    Take 1 tablet (40 mg) by mouth daily        losartan 100 MG tablet    COZAAR    90 tablet    Take 1 tablet (100 mg) by mouth daily    Essential hypertension, benign       pantoprazole 40 MG EC tablet    PROTONIX    30 tablet    Take 1 tablet (40 mg) by mouth daily Take 30-60 minutes before a meal.    Primary osteoarthritis of left knee        PLAQUENIL 200 MG tablet   Generic drug:  hydroxychloroquine     60 tablet    Take 1 tablet by mouth 2 times daily.        PREDNISONE PO      Take 5 mg by mouth daily        traMADol 50 MG tablet    ULTRAM     Take 100 mg by mouth        TYLENOL 500 MG tablet   Generic drug:  acetaminophen      Take 500-1,000 mg by mouth every 4 hours as needed for mild pain        * Notice:  This list has 2 medication(s) that are the same as other medications prescribed for you. Read the directions carefully, and ask your doctor or other care provider to review them with you.

## 2018-10-01 NOTE — PROGRESS NOTES
"Subjective:  HPI                    Objective:  System    Physical Exam    General     ROS    Assessment/Plan:    SUBJECTIVE  Subjective changes as noted by pt:   Doing well. Driving and not using the cane in the house.    Current pain level:   (3-4/10 with tramadol in a.m.)   Changes in function:  Yes (See Goal flowsheet attached for changes in current functional level)     Adverse reaction to treatment or activity:  None    OBJECTIVE  Changes in objective findings:  Yes,  Left knee active heelslide 117, , active extension -3 deg, AA 0 deg. 4 inch step up with fair control and endurance. SLS 3-5\". Good gait without device. Sit to stand test 14 reps in 30 seconds. Minimal edema and small scab is still slightly attached to mid incision.     ASSESSMENT  Nicolás continues to require intervention to meet STG and LTG's: PT  Patient's symptoms are resolving.  Patient is ready to progress to more complex exercises.  Response to therapy has shown an improvement in  pain level, ROM , strength, muscle control, gait and function  Progress made towards STG/LTG?  Yes (See Goal flowsheet attached for updates on achievement of STG and LTG)    PLAN  Current treatment program is being advanced to more complex exercises.    PTA/ATC plan:  N/A    Please refer to the daily flowsheet for treatment today, total treatment time and time spent performing 1:1 timed codes.          "

## 2018-10-08 ENCOUNTER — THERAPY VISIT (OUTPATIENT)
Dept: PHYSICAL THERAPY | Facility: CLINIC | Age: 78
End: 2018-10-08
Payer: COMMERCIAL

## 2018-10-08 DIAGNOSIS — Z96.652 PRESENCE OF LEFT ARTIFICIAL KNEE JOINT: ICD-10-CM

## 2018-10-08 DIAGNOSIS — Z47.1 AFTERCARE FOLLOWING LEFT KNEE JOINT REPLACEMENT SURGERY: ICD-10-CM

## 2018-10-08 DIAGNOSIS — Z96.652 AFTERCARE FOLLOWING LEFT KNEE JOINT REPLACEMENT SURGERY: ICD-10-CM

## 2018-10-08 PROCEDURE — 97112 NEUROMUSCULAR REEDUCATION: CPT | Mod: GP | Performed by: PHYSICAL THERAPIST

## 2018-10-08 PROCEDURE — 97110 THERAPEUTIC EXERCISES: CPT | Mod: GP | Performed by: PHYSICAL THERAPIST

## 2018-10-08 NOTE — PROGRESS NOTES
Subjective:  HPI                    Objective:  System    Physical Exam    General     ROS    Assessment/Plan:    SUBJECTIVE  Subjective changes as noted by pt:  Switched over the the cane this weekend.    Current pain level:  2/10 (Tramadol last night)   Changes in function:  Yes (See Goal flowsheet attached for changes in current functional level)     Adverse reaction to treatment or activity:  None    OBJECTIVE  Changes in objective findings:  Yes,  Left knee AROM supine 0-120. Fair glut max control. Glut medius grade 5-/5 on R, 4+/5 on L. Good gait pattern with SPC, Minimal left extensor lag without the cane. 5 inch step up with fair control. SLS L 5 second max. Most of scab on the incision has fallen off. Two very small scabs remain mid incision.    ASSESSMENT  Nicolás continues to require intervention to meet STG and LTG's: PT  Patient's symptoms are resolving.  Patient is ready to progress to more complex exercises.  Response to therapy has shown an improvement in  pain level, ROM , muscle control, gait and function  Progress made towards STG/LTG?  Yes (See Goal flowsheet attached for updates on achievement of STG and LTG)    PLAN  Current treatment program is being advanced to more complex exercises.    PTA/ATC plan:  N/A    Please refer to the daily flowsheet for treatment today, total treatment time and time spent performing 1:1 timed codes.

## 2018-10-08 NOTE — MR AVS SNAPSHOT
After Visit Summary   10/8/2018    Nicolás Wyman    MRN: [id number]           Patient Information     Date Of Birth          1940        Visit Information        Provider Department      10/8/2018 10:10 AM Filomena Iglesias PT [facility] Physical Therapy        Today's Diagnoses     Aftercare following left knee joint replacement surgery        Presence of left artificial knee joint           Follow-ups after your visit        Your next 10 appointments already scheduled     Oct 16, 2018  9:30 AM CDT   RATNA Extremity with Filomena Iglesias PT   [facility] Physical Therapy ([facility])    [address]  [facility] [address] [phone]              Who to contact     If you have questions or need follow up information about today's clinic visit or your schedule please contact [facility] PHYSICAL THERAPY directly at [phone].  Normal or non-critical lab and imaging results will be communicated to you by MyChart, letter or phone within 4 business days after the clinic has received the results. If you do not hear from us within 7 days, please contact the clinic through MyChart or phone. If you have a critical or abnormal lab result, we will notify you by phone as soon as possible.  Submit refill requests through [employer] or call your pharmacy and they will forward the refill request to us. Please allow 3 business days for your refill to be completed.          Additional Information About Your Visit        Care EveryWhere ID     This is your Care EveryWhere ID. This could be used by other organizations to access your [place] medical records  [id number]        Your Vitals Were     Last Period                   (LMP Unknown)            Blood Pressure from Last 3 Encounters:   09/26/18 146/70   09/05/18 100/70   12/15/17 112/68    Weight from Last 3 Encounters:   09/26/18 92.4  kg (203 lb 11.2 oz)   09/05/18 91 kg (200 lb 9.6 oz)   12/15/17 93 kg (205 lb)              We Performed the Following     NEUROMUSCULAR RE-EDUCATION     THERAPEUTIC EXERCISES        Primary Care Provider Office Phone # Fax #    Yocasta Hernandez [phone]       [address]  [facility] [address]        Equal Access to Services     [facility]: Hadii aad ku hadasho Soomaali, waaxda luqadaha, qaybta kaalmada adeegyada, waxay idiin hayaan adeeg lynne la'aan . So [facility] [phone].    ATENCIÓN: Si habla espdav, tiene a alexandre disposición servicios gratuitos de asistencia lingüística. Donna al [phone].    We comply with applicable federal civil rights laws and [place] laws. We do not discriminate on the basis of race, color, national origin, age, disability, sex, sexual orientation, or gender identity.            Thank you!     Thank you for [facility] PH[facility]ICAL THERAP[facility]  for your care. Our goal is always to provide you with excellent care. Hearing back from our patients is one way we can continue to improve our services. Please take a few minutes to complete the written survey that you may receive in the mail after your visit with us. Thank you!             Your Updated Medication List - Protect others around you: Learn how to safely use, store and throw away your medicines at www.disposemymeds.org.          This list is accurate as of 10/8/18 11:02 AM.  Always use your most recent med list.                   Brand Name Dispense Instructions for use Diagnosis    acetylcysteine 600 MG Caps capsule    N-ACETYL CYSTEINE     Take 1 capsule (600 mg) by mouth daily        Acidophilus Lactobacillus Caps     30 capsule    Take 1 capsule by mouth daily    Primary osteoarthritis of left knee       ADVIL PO           * albuterol (2.5 MG/3ML) 0.083% neb solution     100 mL    Take 1 vial (2.5 mg) by nebulization every 6 hours as needed    Bronchitis with  bronchospasm       * albuterol 108 (90 Base) MCG/ACT inhaler    PROAIR HFA/PROVENTIL HFA/VENTOLIN HFA    1 Inhaler    Inhale 2 puffs into the lungs every 6 hours as needed for shortness of breath / dyspnea    Cough       amLODIPine 5 MG tablet    NORVASC    135 tablet    7.5 mg daily    Essential hypertension, benign       aspirin 81 MG tablet     30 tablet    Take 1 tablet (81 mg) by mouth daily        atenolol 100 MG tablet    TENORMIN    90 tablet    Take 0.5 tablets (50 mg) by mouth daily    Essential hypertension, benign       Calcium + D3 600-200 MG-UNIT Tabs           cholecalciferol 1000 UNIT tablet    vitamin D3    100 tablet    Take 1 tablet (1,000 Units) by mouth daily        doxycycline 100 MG capsule    VIBRAMYCIN    180 capsule    Take 1 capsule by mouth 2 times daily.    Septic arthritis (H)       ferrous sulfate 325 (65 Fe) MG tablet    IRON    30 tablet    Take 1 tablet (325 mg) by mouth daily (with breakfast)        glucosamine 500 MG Caps      Take 1 capful by mouth daily.        IBANdronate 150 MG tablet    BONIVA    3 tablet    Take 1 tablet (150 mg) by mouth every 30 days Take 60 minutes before am meal with 8 oz. water. Remain upright for 60 minutes.    Osteopenia, unspecified location       levOCARNitine 330 MG tablet    CARNITOR     3 tabs bid        LIPITOR 40 MG tablet   Generic drug:  atorvastatin     30 tablet    Take 1 tablet (40 mg) by mouth daily        losartan 100 MG tablet    COZAAR    90 tablet    Take 1 tablet (100 mg) by mouth daily    Essential hypertension, benign       pantoprazole 40 MG EC tablet    PROTONIX    30 tablet    Take 1 tablet (40 mg) by mouth daily Take 30-60 minutes before a meal.    Primary osteoarthritis of left knee       PLAQUENIL 200 MG tablet   Generic drug:  hydroxychloroquine     60 tablet    Take 1 tablet by mouth 2 times daily.        PREDNISONE PO      Take 5 mg by mouth daily        traMADol 50 MG tablet    ULTRAM     Take 100 mg by mouth         TYLENOL 500 MG tablet   Generic drug:  acetaminophen      Take 500-1,000 mg by mouth every 4 hours as needed for mild pain        * Notice:  This list has 2 medication(s) that are the same as other medications prescribed for you. Read the directions carefully, and ask your doctor or other care provider to review them with you.

## 2018-10-12 ENCOUNTER — PATIENT OUTREACH (OUTPATIENT)
Dept: INTERNAL MEDICINE | Facility: CLINIC | Age: 78
End: 2018-10-12

## 2018-10-12 NOTE — PROGRESS NOTES
Clinic Care Coordination Contact    Situation: Patient chart reviewed by care coordinator.    Background: Patient is s/p TKA on 8/2. Procedure done at [facility].  [facility].  Patient has been discharged from home care services and is now driving self to out patient physical therapy.      Assessment: Patient physical therapy notes, patient is doing well.  Using a cane for ambulation and is up independently.  physical therapy is progressing to more complex exercises.  Patient has had f/u with PCP. Patient has good family support.  Daughter very involved.    Plan/Recommendations: No clinic care coordination needs identified.  No further outreaches will be made at this time unless a new referral is made or a change in the pt's status occurs. Patient was provided with this writer's contact information and encouraged to call with any questions or concerns.      Lorena Musa RN-BSN   Care Coordination  Phone:  [phone]  Email: [email or web address]

## 2018-10-16 ENCOUNTER — THERAPY VISIT (OUTPATIENT)
Dept: PHYSICAL THERAPY | Facility: CLINIC | Age: 78
End: 2018-10-16
Payer: COMMERCIAL

## 2018-10-16 DIAGNOSIS — Z96.652 PRESENCE OF LEFT ARTIFICIAL KNEE JOINT: ICD-10-CM

## 2018-10-16 DIAGNOSIS — Z96.652 AFTERCARE FOLLOWING LEFT KNEE JOINT REPLACEMENT SURGERY: ICD-10-CM

## 2018-10-16 DIAGNOSIS — Z47.1 AFTERCARE FOLLOWING LEFT KNEE JOINT REPLACEMENT SURGERY: ICD-10-CM

## 2018-10-16 PROCEDURE — 97110 THERAPEUTIC EXERCISES: CPT | Mod: GP | Performed by: PHYSICAL THERAPIST

## 2018-10-16 PROCEDURE — 97112 NEUROMUSCULAR REEDUCATION: CPT | Mod: GP | Performed by: PHYSICAL THERAPIST

## 2018-10-16 NOTE — PROGRESS NOTES
Subjective:  HPI                    Objective:  System    Physical Exam    General     ROS    Assessment/Plan:    SUBJECTIVE  Subjective changes as noted by pt:   Able to go up and down several stairs reciprocally.   Current pain level:  2/10 (tramadol last night and extra strength Tylenol this morning)   Changes in function:  Yes (See Goal flowsheet attached for changes in current functional level)     Adverse reaction to treatment or activity:  None    OBJECTIVE  Changes in objective findings:  Yes, Left knee AROM supine 0-120.  2 inch forward step down with fair to good control. SLS 3 second max today. Good gait pattern both with and without cane. Left quad and hamstring grade 5-/5. R glut medius grade 5/5, L 5-/5.      ASSESSMENT  Nicolás continues to require intervention to meet STG and LTG's: PT  Patient's symptoms are resolving.  Patient is ready to progress to more complex exercises.  Response to therapy has shown an improvement in  pain level, ROM , strength, muscle control, gait and function  Progress made towards STG/LTG?  Yes (See Goal flowsheet attached for updates on achievement of STG and LTG)    PLAN  Current treatment program is being advanced to more complex exercises.    PTA/ATC plan:  N/A    Please refer to the daily flowsheet for treatment today, total treatment time and time spent performing 1:1 timed codes.

## 2018-10-16 NOTE — MR AVS SNAPSHOT
After Visit Summary   10/16/2018    Nicolás Wyman    MRN: [id number]           Patient Information     Date Of Birth          1940        Visit Information        Provider Department      10/16/2018 9:30 AM Filomena Iglesias PT [facility] Physical Therapy        Today's Diagnoses     Aftercare following left knee joint replacement surgery        Presence of left artificial knee joint           Follow-ups after your visit        Your next 10 appointments already scheduled     Oct 26, 2018  8:50 AM CDT   RATNA Extremity with Filomena Iglesias PT   [facility] Physical Therapy ([facility])    [address]  [facility] [address] [phone]              Who to contact     If you have questions or need follow up information about today's clinic visit or your schedule please contact [facility] PHYSICAL THERAPY directly at [phone].  Normal or non-critical lab and imaging results will be communicated to you by MyChart, letter or phone within 4 business days after the clinic has received the results. If you do not hear from us within 7 days, please contact the clinic through MyChart or phone. If you have a critical or abnormal lab result, we will notify you by phone as soon as possible.  Submit refill requests through [employer] or call your pharmacy and they will forward the refill request to us. Please allow 3 business days for your refill to be completed.          Additional Information About Your Visit        Care EveryWhere ID     This is your Care EveryWhere ID. This could be used by other organizations to access your [place] medical records  [id number]        Your Vitals Were     Last Period                   (LMP Unknown)            Blood Pressure from Last 3 Encounters:   09/26/18 146/70   09/05/18 100/70   12/15/17 112/68    Weight from Last 3 Encounters:   09/26/18 92.4  kg (203 lb 11.2 oz)   09/05/18 91 kg (200 lb 9.6 oz)   12/15/17 93 kg (205 lb)              We Performed the Following     NEUROMUSCULAR RE-EDUCATION     THERAPEUTIC EXERCISES        Primary Care Provider Office Phone # Fax #    Yocasta Hernandez [phone]       [address]  [facility] [address]        Equal Access to Services     [facility]: Hadii aad ku hadasho Soomaali, waaxda luqadaha, qaybta kaalmada adeegyada, waxay idiin hayaan adeeg ellash la'aan . So [facility] [phone].    ATENCIÓN: Si habla espdav, tiene a alexandre disposición servicios gratuitos de asistencia lingüística. Donna al [phone].    We comply with applicable federal civil rights laws and [place] laws. We do not discriminate on the basis of race, color, national origin, age, disability, sex, sexual orientation, or gender identity.            Thank you!     Thank you for [facility] FOR ATHLETIC MEDICINE [facility] PHYSICAL THERAPY  for your care. Our goal is always to provide you with excellent care. Hearing back from our patients is one way we can continue to improve our services. Please take a few minutes to complete the written survey that you may receive in the mail after your visit with us. Thank you!             Your Updated Medication List - Protect others around you: Learn how to safely use, store and throw away your medicines at www.disposemymeds.org.          This list is accurate as of 10/16/18 10:12 AM.  Always use your most recent med list.                   Brand Name Dispense Instructions for use Diagnosis    acetylcysteine 600 MG Caps capsule    N-ACETYL CYSTEINE     Take 1 capsule (600 mg) by mouth daily        Acidophilus Lactobacillus Caps     30 capsule    Take 1 capsule by mouth daily    Primary osteoarthritis of left knee       ADVIL PO           * albuterol (2.5 MG/3ML) 0.083% neb solution     100 mL    Take 1 vial (2.5 mg) by nebulization every 6 hours as needed    Bronchitis with  bronchospasm       * albuterol 108 (90 Base) MCG/ACT inhaler    PROAIR HFA/PROVENTIL HFA/VENTOLIN HFA    1 Inhaler    Inhale 2 puffs into the lungs every 6 hours as needed for shortness of breath / dyspnea    Cough       amLODIPine 5 MG tablet    NORVASC    135 tablet    7.5 mg daily    Essential hypertension, benign       aspirin 81 MG tablet     30 tablet    Take 1 tablet (81 mg) by mouth daily        atenolol 100 MG tablet    TENORMIN    90 tablet    Take 0.5 tablets (50 mg) by mouth daily    Essential hypertension, benign       Calcium + D3 600-200 MG-UNIT Tabs           cholecalciferol 1000 UNIT tablet    vitamin D3    100 tablet    Take 1 tablet (1,000 Units) by mouth daily        doxycycline 100 MG capsule    VIBRAMYCIN    180 capsule    Take 1 capsule by mouth 2 times daily.    Septic arthritis (H)       ferrous sulfate 325 (65 Fe) MG tablet    IRON    30 tablet    Take 1 tablet (325 mg) by mouth daily (with breakfast)        glucosamine 500 MG Caps      Take 1 capful by mouth daily.        IBANdronate 150 MG tablet    BONIVA    3 tablet    Take 1 tablet (150 mg) by mouth every 30 days Take 60 minutes before am meal with 8 oz. water. Remain upright for 60 minutes.    Osteopenia, unspecified location       levOCARNitine 330 MG tablet    CARNITOR     3 tabs bid        LIPITOR 40 MG tablet   Generic drug:  atorvastatin     30 tablet    Take 1 tablet (40 mg) by mouth daily        losartan 100 MG tablet    COZAAR    90 tablet    Take 1 tablet (100 mg) by mouth daily    Essential hypertension, benign       pantoprazole 40 MG EC tablet    PROTONIX    30 tablet    Take 1 tablet (40 mg) by mouth daily Take 30-60 minutes before a meal.    Primary osteoarthritis of left knee       PLAQUENIL 200 MG tablet   Generic drug:  hydroxychloroquine     60 tablet    Take 1 tablet by mouth 2 times daily.        PREDNISONE PO      Take 5 mg by mouth daily        traMADol 50 MG tablet    ULTRAM     Take 100 mg by mouth         TYLENOL 500 MG tablet   Generic drug:  acetaminophen      Take 500-1,000 mg by mouth every 4 hours as needed for mild pain        * Notice:  This list has 2 medication(s) that are the same as other medications prescribed for you. Read the directions carefully, and ask your doctor or other care provider to review them with you.

## 2018-10-26 ENCOUNTER — THERAPY VISIT (OUTPATIENT)
Dept: PHYSICAL THERAPY | Facility: CLINIC | Age: 78
End: 2018-10-26
Payer: COMMERCIAL

## 2018-10-26 DIAGNOSIS — Z96.652 PRESENCE OF LEFT ARTIFICIAL KNEE JOINT: ICD-10-CM

## 2018-10-26 DIAGNOSIS — Z47.1 AFTERCARE FOLLOWING LEFT KNEE JOINT REPLACEMENT SURGERY: ICD-10-CM

## 2018-10-26 DIAGNOSIS — Z96.652 AFTERCARE FOLLOWING LEFT KNEE JOINT REPLACEMENT SURGERY: ICD-10-CM

## 2018-10-26 PROCEDURE — G8980 MOBILITY D/C STATUS: HCPCS | Mod: GP | Performed by: PHYSICAL THERAPIST

## 2018-10-26 PROCEDURE — 97112 NEUROMUSCULAR REEDUCATION: CPT | Mod: GP | Performed by: PHYSICAL THERAPIST

## 2018-10-26 PROCEDURE — 97110 THERAPEUTIC EXERCISES: CPT | Mod: GP | Performed by: PHYSICAL THERAPIST

## 2018-10-26 PROCEDURE — G8979 MOBILITY GOAL STATUS: HCPCS | Mod: GP | Performed by: PHYSICAL THERAPIST

## 2018-10-26 ASSESSMENT — ACTIVITIES OF DAILY LIVING (ADL)
HOW_WOULD_YOU_RATE_THE_CURRENT_FUNCTION_OF_YOUR_KNEE_DURING_YOUR_USUAL_DAILY_ACTIVITIES_ON_A_SCALE_FROM_0_TO_100_WITH_100_BEING_YOUR_LEVEL_OF_KNEE_FUNCTION_PRIOR_TO_YOUR_INJURY_AND_0_BEING_THE_INABILITY_TO_PERFORM_ANY_OF_YOUR_USUAL_DAILY_ACTIVITIES?: 90
SQUAT: ACTIVITY IS NOT DIFFICULT
HOW_WOULD_YOU_RATE_THE_OVERALL_FUNCTION_OF_YOUR_KNEE_DURING_YOUR_USUAL_DAILY_ACTIVITIES?: NEARLY NORMAL
GIVING WAY, BUCKLING OR SHIFTING OF KNEE: I DO NOT HAVE THE SYMPTOM
KNEEL ON THE FRONT OF YOUR KNEE: ACTIVITY IS SOMEWHAT DIFFICULT
AS_A_RESULT_OF_YOUR_KNEE_INJURY,_HOW_WOULD_YOU_RATE_YOUR_CURRENT_LEVEL_OF_DAILY_ACTIVITY?: NEARLY NORMAL
KNEE_ACTIVITY_OF_DAILY_LIVING_SUM: 57
RAW_SCORE: 57
PAIN: THE SYMPTOM AFFECTS MY ACTIVITY SLIGHTLY
WALK: ACTIVITY IS MINIMALLY DIFFICULT
GO UP STAIRS: ACTIVITY IS MINIMALLY DIFFICULT
STAND: ACTIVITY IS MINIMALLY DIFFICULT
SIT WITH YOUR KNEE BENT: ACTIVITY IS NOT DIFFICULT
LIMPING: I HAVE THE SYMPTOM BUT IT DOES NOT AFFECT MY ACTIVITY
RISE FROM A CHAIR: ACTIVITY IS NOT DIFFICULT
STIFFNESS: THE SYMPTOM AFFECTS MY ACTIVITY SLIGHTLY
WEAKNESS: I DO NOT HAVE THE SYMPTOM
GO DOWN STAIRS: ACTIVITY IS MINIMALLY DIFFICULT
SWELLING: THE SYMPTOM AFFECTS MY ACTIVITY SLIGHTLY
KNEE_ACTIVITY_OF_DAILY_LIVING_SCORE: 81.43

## 2018-10-26 NOTE — PROGRESS NOTES
Subjective:  HPI                    Objective:  System    Physical Exam    General     ROS    Assessment/Plan:    DISCHARGE REPORT    Progress reporting period is from 9/21/18 to 10/26/18.       SUBJECTIVE  Subjective changes noted by patient:   Doing very well. Decreasing left knee pain and function is improving. Feels she is ready to stop PT.      Current pain level is 1-2/10 no Tramadol yesterday, just Tylenol  Previous pain level was  3/10 (with pain meds 2 times per day).   Changes in function:  Yes (See Goal flowsheet attached for changes in current functional level)  Adverse reaction to treatment or activity: None    OBJECTIVE  Changes noted in objective findings:  Yes, Left knee AROM supine 0-120.  4 inch forward step down with fair control and endurance. SLS 9 seconds max on R, 5second max on left. Good gait pattern both with and without cane. Left quad and hamstring grade 5-/5. R glut medius grade 5/5, L 5-/5. Sit to stand test with 17 reps in 30 seconds. Small scab still remains on mid incision, otherwise it is healed well.      ASSESSMENT/PLAN  Updated problem list and treatment plan: Diagnosis 1:  S/p L TKA  8/2/18  Pain -  home program  Decreased strength - home program  Impaired gait - home program  Impaired muscle performance - home program  Decreased function - home program  STG/LTGs have been met or progress has been made towards goals:  Yes (See Goal flow sheet completed today.)  Assessment of Progress: The patient's progress has slowed.  Self Management Plans:  Patient has been instructed in a home treatment program.    Nicolás continues to require the following intervention to meet STG and LTG's:  PT intervention is no longer required to meet STG/LTG.    Recommendations:  This patient is ready to be discharged from therapy and continue their home treatment program.    Please refer to the daily flowsheet for treatment today, total treatment time and time spent performing 1:1 timed  codes.

## 2018-10-26 NOTE — MR AVS SNAPSHOT
After Visit Summary   10/26/2018    Nicolás Wyman    MRN: [id number]           Patient Information     Date Of Birth          1940        Visit Information        Provider Department      10/26/2018 8:50 AM Filomena Iglesias PT [facility] Physical Therapy        Today's Diagnoses     Aftercare following left knee joint replacement surgery        Presence of left artificial knee joint           Follow-ups after your visit        Your next 10 appointments already scheduled     Nov 15, 2018  7:40 AM CST   PHYSICAL with Yocasta Hernandez MD   [facility]    [address]  [facility] [address] [phone]              Who to contact     If you have questions or need follow up information about today's clinic visit or your schedule please contact [facility] PHYSICAL THERAPY directly at [phone].  Normal or non-critical lab and imaging results will be communicated to you by MyChart, letter or phone within 4 business days after the clinic has received the results. If you do not hear from us within 7 days, please contact the clinic through MyChart or phone. If you have a critical or abnormal lab result, we will notify you by phone as soon as possible.  Submit refill requests through [employer] or call your pharmacy and they will forward the refill request to us. Please allow 3 business days for your refill to be completed.          Additional Information About Your Visit        Care EveryWhere ID     This is your Care EveryWhere ID. This could be used by other organizations to access your [place] medical records  [id number]        Your Vitals Were     Last Period                   (LMP Unknown)            Blood Pressure from Last 3 Encounters:   09/26/18 146/70   09/05/18 100/70   12/15/17 112/68    Weight from Last 3 Encounters:   09/26/18 92.4 kg (203 lb 11.2 oz)   09/05/18 91  kg (200 lb 9.6 oz)   12/15/17 93 kg (205 lb)              We Performed the Following     RATNA PROGRESS NOTES REPORT     NEUROMUSCULAR RE-EDUCATION     THERAPEUTIC EXERCISES        Primary Care Provider Office Phone # Fax #    Yocasta Hernandez [phone]       [address]  [facility] [address]        Equal Access to Services     [facility] : Hadii aad ku hadasho Soomaali, waaxda luqadaha, qaybta kaalmada adeegyada, waxay idiin hayaan adeeg khrose marymare laoctavianon . So [facility] [phone].    ATENCIÓN: Si habla español, tiene a alexandre disposición servicios gratuitos de asistencia lingüística. [facility] [phone].    We comply with applicable federal civil rights laws and [place] laws. We do not discriminate on the basis of race, color, national origin, age, disability, sex, sexual orientation, or gender identity.            Thank you!     Thank you for choosing [facility] FOR ATHLETIC MEDICINE [facility] PHYSICAL THERAPY  for your care. Our goal is always to provide you with excellent care. Hearing back from our patients is one way we can continue to improve our services. Please take a few minutes to complete the written survey that you may receive in the mail after your visit with us. Thank you!             Your Updated Medication List - Protect others around you: Learn how to safely use, store and throw away your medicines at www.disposemymeds.org.          This list is accurate as of 10/26/18 10:07 AM.  Always use your most recent med list.                   Brand Name Dispense Instructions for use Diagnosis    acetylcysteine 600 MG Caps capsule    N-ACETYL CYSTEINE     Take 1 capsule (600 mg) by mouth daily        Acidophilus Lactobacillus Caps     30 capsule    Take 1 capsule by mouth daily    Primary osteoarthritis of left knee       ADVIL PO           * albuterol (2.5 MG/3ML) 0.083% neb solution     100 mL    Take 1 vial (2.5 mg) by nebulization every 6 hours as needed    Bronchitis with  bronchospasm       * albuterol 108 (90 Base) MCG/ACT inhaler    PROAIR HFA/PROVENTIL HFA/VENTOLIN HFA    1 Inhaler    Inhale 2 puffs into the lungs every 6 hours as needed for shortness of breath / dyspnea    Cough       amLODIPine 5 MG tablet    NORVASC    135 tablet    7.5 mg daily    Essential hypertension, benign       aspirin 81 MG tablet     30 tablet    Take 1 tablet (81 mg) by mouth daily        atenolol 100 MG tablet    TENORMIN    90 tablet    Take 0.5 tablets (50 mg) by mouth daily    Essential hypertension, benign       Calcium + D3 600-200 MG-UNIT Tabs           cholecalciferol 1000 UNIT tablet    vitamin D3    100 tablet    Take 1 tablet (1,000 Units) by mouth daily        doxycycline 100 MG capsule    VIBRAMYCIN    180 capsule    Take 1 capsule by mouth 2 times daily.    Septic arthritis (H)       ferrous sulfate 325 (65 Fe) MG tablet    IRON    30 tablet    Take 1 tablet (325 mg) by mouth daily (with breakfast)        glucosamine 500 MG Caps      Take 1 capful by mouth daily.        IBANdronate 150 MG tablet    BONIVA    3 tablet    Take 1 tablet (150 mg) by mouth every 30 days Take 60 minutes before am meal with 8 oz. water. Remain upright for 60 minutes.    Osteopenia, unspecified location       levOCARNitine 330 MG tablet    CARNITOR     3 tabs bid        LIPITOR 40 MG tablet   Generic drug:  atorvastatin     30 tablet    Take 1 tablet (40 mg) by mouth daily        losartan 100 MG tablet    COZAAR    90 tablet    Take 1 tablet (100 mg) by mouth daily    Essential hypertension, benign       pantoprazole 40 MG EC tablet    PROTONIX    30 tablet    Take 1 tablet (40 mg) by mouth daily Take 30-60 minutes before a meal.    Primary osteoarthritis of left knee       PLAQUENIL 200 MG tablet   Generic drug:  hydroxychloroquine     60 tablet    Take 1 tablet by mouth 2 times daily.        PREDNISONE PO      Take 5 mg by mouth daily        traMADol 50 MG tablet    ULTRAM     Take 100 mg by mouth         TYLENOL 500 MG tablet   Generic drug:  acetaminophen      Take 500-1,000 mg by mouth every 4 hours as needed for mild pain        * Notice:  This list has 2 medication(s) that are the same as other medications prescribed for you. Read the directions carefully, and ask your doctor or other care provider to review them with you.

## 2018-11-15 ENCOUNTER — OFFICE VISIT (OUTPATIENT)
Dept: INTERNAL MEDICINE | Facility: CLINIC | Age: 78
End: 2018-11-15
Payer: COMMERCIAL

## 2018-11-15 ENCOUNTER — TELEPHONE (OUTPATIENT)
Dept: INTERNAL MEDICINE | Facility: CLINIC | Age: 78
End: 2018-11-15

## 2018-11-15 VITALS
RESPIRATION RATE: 20 BRPM | HEIGHT: 58 IN | HEART RATE: 89 BPM | OXYGEN SATURATION: 96 % | DIASTOLIC BLOOD PRESSURE: 60 MMHG | BODY MASS INDEX: 43.24 KG/M2 | WEIGHT: 206 LBS | TEMPERATURE: 98.1 F | SYSTOLIC BLOOD PRESSURE: 120 MMHG

## 2018-11-15 DIAGNOSIS — K21.9 GASTROESOPHAGEAL REFLUX DISEASE WITHOUT ESOPHAGITIS: ICD-10-CM

## 2018-11-15 DIAGNOSIS — Z00.00 ENCOUNTER FOR ROUTINE ADULT HEALTH EXAMINATION WITHOUT ABNORMAL FINDINGS: Primary | ICD-10-CM

## 2018-11-15 DIAGNOSIS — I10 ESSENTIAL HYPERTENSION, BENIGN: ICD-10-CM

## 2018-11-15 DIAGNOSIS — J84.10 PULMONARY INTERSTITIAL FIBROSIS (H): ICD-10-CM

## 2018-11-15 DIAGNOSIS — Z13.6 CARDIOVASCULAR SCREENING; LDL GOAL LESS THAN 130: ICD-10-CM

## 2018-11-15 DIAGNOSIS — Z78.0 ASYMPTOMATIC POSTMENOPAUSAL STATUS: ICD-10-CM

## 2018-11-15 DIAGNOSIS — M85.80 OSTEOPENIA, UNSPECIFIED LOCATION: ICD-10-CM

## 2018-11-15 DIAGNOSIS — R09.82 POSTNASAL DRIP: ICD-10-CM

## 2018-11-15 DIAGNOSIS — E66.01 MORBID OBESITY, UNSPECIFIED OBESITY TYPE (H): ICD-10-CM

## 2018-11-15 DIAGNOSIS — C85.90 NON-HODGKIN'S LYMPHOMA, UNSPECIFIED BODY REGION, UNSPECIFIED NON-HODGKIN LYMPHOMA TYPE (H): ICD-10-CM

## 2018-11-15 LAB
ANION GAP SERPL CALCULATED.3IONS-SCNC: 7 MMOL/L (ref 3–14)
BUN SERPL-MCNC: 10 MG/DL (ref 7–30)
CALCIUM SERPL-MCNC: 9.2 MG/DL (ref 8.5–10.1)
CHLORIDE SERPL-SCNC: 95 MMOL/L (ref 94–109)
CHOLEST SERPL-MCNC: 131 MG/DL
CO2 SERPL-SCNC: 30 MMOL/L (ref 20–32)
CREAT SERPL-MCNC: 0.65 MG/DL (ref 0.52–1.04)
CREAT UR-MCNC: 33 MG/DL
GFR SERPL CREATININE-BSD FRML MDRD: 88 ML/MIN/1.7M2
GLUCOSE SERPL-MCNC: 82 MG/DL (ref 70–99)
HDLC SERPL-MCNC: 79 MG/DL
HGB BLD-MCNC: 13.8 G/DL (ref 11.7–15.7)
LDLC SERPL CALC-MCNC: 34 MG/DL
MICROALBUMIN UR-MCNC: 16 MG/L
MICROALBUMIN/CREAT UR: 49.09 MG/G CR (ref 0–25)
NONHDLC SERPL-MCNC: 52 MG/DL
POTASSIUM SERPL-SCNC: 3.9 MMOL/L (ref 3.4–5.3)
SODIUM SERPL-SCNC: 132 MMOL/L (ref 133–144)
TRIGL SERPL-MCNC: 91 MG/DL

## 2018-11-15 PROCEDURE — 80048 BASIC METABOLIC PNL TOTAL CA: CPT | Performed by: INTERNAL MEDICINE

## 2018-11-15 PROCEDURE — 80061 LIPID PANEL: CPT | Performed by: INTERNAL MEDICINE

## 2018-11-15 PROCEDURE — 85018 HEMOGLOBIN: CPT | Performed by: INTERNAL MEDICINE

## 2018-11-15 PROCEDURE — G0439 PPPS, SUBSEQ VISIT: HCPCS | Performed by: INTERNAL MEDICINE

## 2018-11-15 PROCEDURE — 82043 UR ALBUMIN QUANTITATIVE: CPT | Performed by: INTERNAL MEDICINE

## 2018-11-15 PROCEDURE — 36415 COLL VENOUS BLD VENIPUNCTURE: CPT | Performed by: INTERNAL MEDICINE

## 2018-11-15 RX ORDER — PANTOPRAZOLE SODIUM 40 MG/1
40 TABLET, DELAYED RELEASE ORAL DAILY
Qty: 90 TABLET | Refills: 3 | Status: SHIPPED | OUTPATIENT
Start: 2018-11-15 | End: 2019-11-29

## 2018-11-15 RX ORDER — IBANDRONATE SODIUM 150 MG/1
150 TABLET, FILM COATED ORAL
Qty: 3 TABLET | Refills: 3 | Status: SHIPPED | OUTPATIENT
Start: 2018-11-15 | End: 2019-11-29

## 2018-11-15 RX ORDER — LOSARTAN POTASSIUM 100 MG/1
100 TABLET ORAL DAILY
Qty: 90 TABLET | Refills: 3 | Status: SHIPPED | OUTPATIENT
Start: 2018-11-15 | End: 2019-11-29

## 2018-11-15 ASSESSMENT — ENCOUNTER SYMPTOMS
PARESTHESIAS: 0
BREAST MASS: 0
CHILLS: 0
ARTHRALGIAS: 1
NAUSEA: 1
HEMATOCHEZIA: 0
ABDOMINAL PAIN: 0
JOINT SWELLING: 1
HEADACHES: 0
DIARRHEA: 0
HEARTBURN: 1
HEMATURIA: 0
EYE PAIN: 0
MYALGIAS: 1
COUGH: 1
DIZZINESS: 0
PALPITATIONS: 0
SHORTNESS OF BREATH: 1
WEAKNESS: 1
DYSURIA: 0
CONSTIPATION: 0
SORE THROAT: 0

## 2018-11-15 ASSESSMENT — ACTIVITIES OF DAILY LIVING (ADL): CURRENT_FUNCTION: NO ASSISTANCE NEEDED

## 2018-11-15 NOTE — PATIENT INSTRUCTIONS
Shingrix is the new shingles vaccine. Call insurance to find out if covered, whether covered at a pharmacy or doctor's office and the copay.     Flonase or Nasacort for the nasal drainage.     You can try melatonin for sleep, 3-5 mg.

## 2018-11-15 NOTE — MR AVS SNAPSHOT
"              After Visit Summary   11/15/2018    Nicolás Wyman    MRN: [id number]           Patient Information     Date Of Birth          1940        Visit Information        Provider Department      11/15/2018 7:40 AM Yocasta Hernandez MD [facility]        Today's Diagnoses     Encounter for routine adult health examination without abnormal findings    -  1    Essential hypertension, benign        Osteopenia, unspecified location        Gastroesophageal reflux disease without esophagitis        CARDIOVASCULAR SCREENING; LDL GOAL LESS THAN 130          Care Instructions    Shingrix is the new shingles vaccine. Call insurance to find out if covered, whether covered at a pharmacy or doctor's office and the copay.     Flonase or Nasacort for the nasal drainage.     You can try melatonin for sleep, 3-5 mg.               Follow-ups after your visit        Who to contact     If you have questions or need follow up information about today's clinic visit or your schedule please contact [facility] directly at [phone].  Normal or non-critical lab and imaging results will be communicated to you by MyChart, letter or phone within 4 business days after the clinic has received the results. If you do not hear from us within 7 days, please contact the clinic through MyChart or phone. If you have a critical or abnormal lab result, we will notify you by phone as soon as possible.  Submit refill requests through [employer] or call your pharmacy and they will forward the refill request to us. Please allow 3 business days for your refill to be completed.          Additional Information About Your Visit        Care EveryWhere ID     This is your Care EveryWhere ID. This could be used by other organizations to access your [place] medical records  [id number]        Your Vitals Were     Pulse Temperature Respirations Height Last Period Pulse Oximetry    89 98.1  F (36.7  C) (Oral) 20 4' 10.25\" " (1.48 m) (LMP Unknown) 96%    BMI (Body Mass Index)                   42.69 kg/m2            Blood Pressure from Last 3 Encounters:   11/15/18 120/60   09/26/18 146/70   09/05/18 100/70    Weight from Last 3 Encounters:   11/15/18 206 lb (93.4 kg)   09/26/18 203 lb 11.2 oz (92.4 kg)   09/05/18 200 lb 9.6 oz (91 kg)              We Performed the Following     Albumin Random Urine Quantitative with Creat Ratio     Basic metabolic panel     Hemoglobin     Lipid panel reflex to direct LDL Fasting          Where to get your medicines      These medications were sent to [facility] [address]  [address] [facility] [address]     Phone:  [phone]     IBANdronate 150 MG tablet    losartan 100 MG tablet    pantoprazole 40 MG EC tablet          Primary Care Provider Office Phone # Fax #    Yocasta Hernandez [phone]       [address]  [facility] [address]        Equal Access to Services     [facility] : Hadii aad ku hadasho Soomaali, waaxda luqadaha, qaybta kaalmada adeegyada, waxay idiin hayfelipen indira hendrix . So [facility] [phone].    ATENCIÓN: Si habla español, tiene a alexandre disposición servicios gratuitos de asistencia lingüística. Llame al [phone].    We comply with applicable federal civil rights laws and [place] laws. We do not discriminate on the basis of race, color, national origin, age, disability, sex, sexual orientation, or gender identity.            Thank you!     Thank you for choosing [facility]  for your care. Our goal is always to provide you with excellent care. Hearing back from our patients is one way we can continue to improve our services. Please take a few minutes to complete the written survey that you may receive in the mail after your visit with us. Thank you!             Your Updated Medication List - Protect others around you: Learn how to safely use, store and throw away your  medicines at www.disposemymeds.org.          This list is accurate as of 11/15/18  8:30 AM.  Always use your most recent med list.                   Brand Name Dispense Instructions for use Diagnosis    acetylcysteine 600 MG Caps capsule    N-ACETYL CYSTEINE     Take 1 capsule (600 mg) by mouth daily        Acidophilus Lactobacillus Caps     30 capsule    Take 1 capsule by mouth daily    Primary osteoarthritis of left knee       ADVIL PO           * albuterol (2.5 MG/3ML) 0.083% neb solution     100 mL    Take 1 vial (2.5 mg) by nebulization every 6 hours as needed    Bronchitis with bronchospasm       * albuterol 108 (90 Base) MCG/ACT inhaler    PROAIR HFA/PROVENTIL HFA/VENTOLIN HFA    1 Inhaler    Inhale 2 puffs into the lungs every 6 hours as needed for shortness of breath / dyspnea    Cough       amLODIPine 5 MG tablet    NORVASC    135 tablet    7.5 mg daily    Essential hypertension, benign       aspirin 81 MG tablet     30 tablet    Take 1 tablet (81 mg) by mouth daily        atenolol 100 MG tablet    TENORMIN    90 tablet    Take 0.5 tablets (50 mg) by mouth daily    Essential hypertension, benign       Calcium + D3 600-200 MG-UNIT Tabs           cholecalciferol 1000 UNIT tablet    vitamin D3    100 tablet    Take 1 tablet (1,000 Units) by mouth daily        doxycycline 100 MG capsule    VIBRAMYCIN    180 capsule    Take 1 capsule by mouth 2 times daily.    Septic arthritis (H)       ferrous sulfate 325 (65 Fe) MG tablet    IRON    30 tablet    Take 1 tablet (325 mg) by mouth daily (with breakfast)        glucosamine 500 MG Caps      Take 1 capful by mouth daily.        IBANdronate 150 MG tablet    BONIVA    3 tablet    Take 1 tablet (150 mg) by mouth every 30 days Take 60 minutes before am meal with 8 oz. water. Remain upright for 60 minutes.    Osteopenia, unspecified location       levOCARNitine 330 MG tablet    CARNITOR     3 tabs bid        LIPITOR 40 MG tablet   Generic drug:  atorvastatin     30  tablet    Take 1 tablet (40 mg) by mouth daily        losartan 100 MG tablet    COZAAR    90 tablet    Take 1 tablet (100 mg) by mouth daily    Essential hypertension, benign       pantoprazole 40 MG EC tablet    PROTONIX    90 tablet    Take 1 tablet (40 mg) by mouth daily Take 30-60 minutes before a meal.    Gastroesophageal reflux disease without esophagitis       PLAQUENIL 200 MG tablet   Generic drug:  hydroxychloroquine     60 tablet    Take 1 tablet by mouth 2 times daily.        PREDNISONE PO      Take 5 mg by mouth daily        traMADol 50 MG tablet    ULTRAM     Take 100 mg by mouth        TYLENOL 500 MG tablet   Generic drug:  acetaminophen      Take 500-1,000 mg by mouth every 4 hours as needed for mild pain        * Notice:  This list has 2 medication(s) that are the same as other medications prescribed for you. Read the directions carefully, and ask your doctor or other care provider to review them with you.

## 2018-11-15 NOTE — TELEPHONE ENCOUNTER
RAPHAEL signed from patient for [facility] to release patient's Stress Test information.       RAPHALE faxed to [phone] and RAPHAEL form sent to scanning.      Records will be faxed to [employer] at [phone]

## 2018-11-15 NOTE — PROGRESS NOTES
"SUBJECTIVE:   Nicolás Wyman is a 78 year old female who presents for Preventive Visit.    Are you in the first 12 months of your Medicare coverage?  No    Annual Wellness Visit     In general, how would you rate your overall health?  Good    Frequency of exercise:  1 day/week    Duration of exercise:  15-30 minutes    Do you usually eat at least 4 servings of fruit and vegetables a day, include whole grains    & fiber and avoid regularly eating high fat or \"junk\" foods?  Yes    Taking medications regularly:  Yes    Medication side effects:  Muscle aches and Other    Ability to successfully perform activities of daily living:  No assistance needed    Home Safety:  No safety concerns identified    Hearing Impairment:  Need to ask people to speak up or repeat themselves and difficulty understanding soft or whispered speech    In the past 6 months, have you been bothered by leaking of urine?  No    In general, how would you rate your overall mental or emotional health?  Good    PHQ-2 Total Score: 1    Additional concerns today:  No          COGNITIVE SCREEN  1) Repeat 3 items (Leader, Season, Table)    2) Clock draw:   3) 3 item recall: Recalls 3 objects  Results: 3 items recalled: COGNITIVE IMPAIRMENT LESS LIKELY    Mini-CogTM Copyright S Martir. Licensed by the author for use in [facility]; reprinted with permission (soob@[email or web address]). All rights reserved.        Reviewed and updated as needed this visit by clinical staff  Tobacco  Allergies  Meds  Med Hx  Surg Hx  Fam Hx  Soc Hx        Reviewed and updated as needed this visit by Provider        Social History   Substance Use Topics     Smoking status: Never Smoker     Smokeless tobacco: Never Used     Alcohol use No       Alcohol Use 11/15/2018   If you drink alcohol do you typically have greater than 3 drinks per day OR greater than 7 drinks per week? No         Do you feel safe in your environment - Yes    Do you have a Health Care Directive?: " Yes: Patient states has Advance Directive and will bring in a copy to clinic.- brought copy in today , will scan.    Current providers sharing in care for this patient include:   Patient Care Team:  Yocasta Hernandez MD as PCP - General    The following health maintenance items are reviewed in Epic and correct as of today:  Health Maintenance   Topic Date Due     MICROALBUMIN Q1 YEAR  11/20/2018     FALL RISK ASSESSMENT  09/05/2019     PHQ-2 Q1 YR  09/05/2019     ADVANCE DIRECTIVE PLANNING Q5 YRS  09/26/2023     TETANUS Q10 YR  11/13/2025     DEXA SCAN SCREENING (SYSTEM ASSIGNED)  Completed     PNEUMOCOCCAL  Completed     INFLUENZA VACCINE  Completed     Patient Active Problem List   Diagnosis     Essential hypertension, benign     Rheumatoid arthritis (HCC)     Disorder of bone and cartilage     CARDIOVASCULAR SCREENING; LDL GOAL LESS THAN 130     Advanced directives, counseling/discussion     Health Care Home     Gait disturbance     Non Hodgkin's lymphoma (H)     Pulmonary interstitial fibrosis (H)     JIGNESH (obstructive sleep apnea)     Morbid obesity, unspecified obesity type (H)     Current Outpatient Prescriptions   Medication Sig Dispense Refill     acetaminophen (TYLENOL) 500 MG tablet Take 500-1,000 mg by mouth every 4 hours as needed for mild pain       acetylcysteine (N-ACETYL CYSTEINE) 600 MG CAPS capsule Take 1 capsule (600 mg) by mouth daily       Acidophilus Lactobacillus CAPS Take 1 capsule by mouth daily 30 capsule 5     albuterol (2.5 MG/3ML) 0.083% nebulizer solution Take 1 vial (2.5 mg) by nebulization every 6 hours as needed 100 mL 5     albuterol (PROAIR HFA, PROVENTIL HFA, VENTOLIN HFA) 108 (90 BASE) MCG/ACT inhaler Inhale 2 puffs into the lungs every 6 hours as needed for shortness of breath / dyspnea 1 Inhaler 5     amLODIPine (NORVASC) 5 MG tablet 7.5 mg daily 135 tablet 3     aspirin 81 MG tablet Take 1 tablet (81 mg) by mouth daily 30 tablet      atenolol (TENORMIN) 100 MG tablet Take 0.5  tablets (50 mg) by mouth daily 90 tablet 3     atorvastatin (LIPITOR) 40 MG tablet Take 1 tablet (40 mg) by mouth daily 30 tablet 1     Calcium Carb-Cholecalciferol (CALCIUM + D3) 600-200 MG-UNIT TABS        cholecalciferol (VITAMIN D3) 1000 UNIT tablet Take 1 tablet (1,000 Units) by mouth daily 100 tablet 3     doxycycline (VIBRAMYCIN) 100 MG capsule Take 1 capsule by mouth 2 times daily. 180 capsule 1     ferrous sulfate (IRON) 325 (65 Fe) MG tablet Take 1 tablet (325 mg) by mouth daily (with breakfast) 30 tablet 2     Glucosamine 500 MG CAPS Take 1 capful by mouth daily.       hydroxychloroquine (PLAQUENIL) 200 MG tablet Take 1 tablet by mouth 2 times daily. 60 tablet 1     IBANdronate (BONIVA) 150 MG tablet Take 1 tablet (150 mg) by mouth every 30 days Take 60 minutes before am meal with 8 oz. water. Remain upright for 60 minutes. 3 tablet 3     Ibuprofen (ADVIL PO)        levOCARNitine (CARNITOR) 330 MG tablet 3 tabs bid       losartan (COZAAR) 100 MG tablet Take 1 tablet (100 mg) by mouth daily 90 tablet 3     pantoprazole (PROTONIX) 40 MG EC tablet Take 1 tablet (40 mg) by mouth daily Take 30-60 minutes before a meal. 30 tablet 1     PREDNISONE PO Take 5 mg by mouth daily       traMADol (ULTRAM) 50 MG tablet Take 100 mg by mouth            Review of Systems   Constitutional: Negative for chills.   HENT: Negative for congestion, ear pain and sore throat.    Eyes: Negative for pain and visual disturbance.   Respiratory: Positive for cough and shortness of breath.    Cardiovascular: Positive for peripheral edema. Negative for chest pain and palpitations.   Gastrointestinal: Positive for heartburn and nausea. Negative for abdominal pain, constipation, diarrhea and hematochezia.   Breasts:  Negative for tenderness, breast mass and discharge.   Genitourinary: Negative for dysuria, genital sores, hematuria, pelvic pain, urgency and vaginal bleeding.   Musculoskeletal: Positive for arthralgias, joint swelling and  "myalgias.   Skin: Negative for rash.   Neurological: Positive for weakness. Negative for dizziness, headaches and paresthesias.   Psychiatric/Behavioral: Negative for mood changes.       She reports that she has been having postnasal drainage that seems to be because of the cough.  She also has been having heartburn, she is taking the Protonix with food though.  Her arthritis is overall stable.  Her edema is stable.  Her dyspnea on exertion is overall stable.  She is just feeling a little weakness recovering from her knee surgery a few months ago.    OBJECTIVE:   /60  Pulse 89  Temp 98.1  F (36.7  C) (Oral)  Resp 20  Ht 4' 10.25\" (1.48 m)  Wt 206 lb (93.4 kg)  LMP  (LMP Unknown)  SpO2 96%  BMI 42.69 kg/m2 Estimated body mass index is 42.69 kg/(m^2) as calculated from the following:    Height as of this encounter: 4' 10.25\" (1.48 m).    Weight as of this encounter: 206 lb (93.4 kg).  Physical Exam      HEENT: extraocular movements are intact, pupils equal and reactive to light and accommodation, TMs clear, oropharynx clear  NECK: Neck supple. No adenopathy. Thyroid symmetric, normal size,, Carotids without bruits.  PULMONARY: stable crackles bases  CARDIAC: regular S1, S2 with 2/6 MARLENE  PULSES: 2/2 throughout  BACK: no spinal or CVAT  ABDOMINAL: Soft, nontender.  Normal bowel sounds.  No hepatosplenomegaly or abnormal masses  BREAST: No breast masses or tenderness, No axillary masses or tenderness and No galactorrhea  PELVIC: not needed  REFLEXES: 2+ throughout  SKIN: unremarkable         ASSESSMENT / PLAN:   1. Encounter for routine adult health examination without abnormal findings    - Hemoglobin  - Basic metabolic panel    2. Non-Hodgkin's lymphoma, unspecified body region, unspecified non-Hodgkin lymphoma type (H)  Stable,  follow-up with specialist    3. Morbid obesity, unspecified obesity type (H)  Weight is overall stable, continue watching diet    4. Pulmonary interstitial fibrosis (H)  Overall " "fairly stable    5. Essential hypertension, benign  Well-controlled, continue medication  - losartan (COZAAR) 100 MG tablet; Take 1 tablet (100 mg) by mouth daily  Dispense: 90 tablet; Refill: 3  - Basic metabolic panel  - Albumin Random Urine Quantitative with Creat Ratio    6. Osteopenia, unspecified location  Stable, due for bone density  - IBANdronate (BONIVA) 150 MG tablet; Take 1 tablet (150 mg) by mouth every 30 days Take 60 minutes before am meal with 8 oz. water. Remain upright for 60 minutes.  Dispense: 3 tablet; Refill: 3    7. Gastroesophageal reflux disease without esophagitis  Recommend she take her medication on empty stomach  - pantoprazole (PROTONIX) 40 MG EC tablet; Take 1 tablet (40 mg) by mouth daily Take 30-60 minutes before a meal.  Dispense: 90 tablet; Refill: 3    8. Postnasal drip  She can try over-the-counter Flonase    9. CARDIOVASCULAR SCREENING; LDL GOAL LESS THAN 130  She was put on Lipitor [facility] after having a stress test in 2014 however she is never really had elevated LDL, we will get the reports of her stress test and determine whether she needs to stay on this long-term.  - Lipid panel reflex to direct LDL Fasting    End of Life Planning:  Patient currently has an advanced directive: Yes.  Practitioner is supportive of decision.    COUNSELING:  Reviewed preventive health counseling, as reflected in patient instructions    BP Readings from Last 1 Encounters:   11/15/18 120/60     Estimated body mass index is 42.69 kg/(m^2) as calculated from the following:    Height as of this encounter: 4' 10.25\" (1.48 m).    Weight as of this encounter: 206 lb (93.4 kg).      Weight management plan: Discussed healthy diet and exercise guidelines and patient will follow up in 12 months in clinic to re-evaluate.     reports that she has never smoked. She has never used smokeless tobacco.      Appropriate preventive services were discussed with this patient, including applicable screening as " appropriate for cardiovascular disease, diabetes, osteopenia/osteoporosis, and glaucoma.  As appropriate for age/gender, discussed screening for colorectal cancer, prostate cancer, breast cancer, and cervical cancer. Checklist reviewing preventive services available has been given to the patient.    Reviewed patients plan of care and provided an AVS. The Basic Care Plan (routine screening as documented in Health Maintenance) for [place] meets the Care Plan requirement. This Care Plan has been established and reviewed with the Patient.    Counseling Resources:  ATP IV Guidelines  Pooled Cohorts Equation Calculator  Breast Cancer Risk Calculator  FRAX Risk Assessment  ICSI Preventive Guidelines  Dietary Guidelines for Americans, 2010  [employer]'s MyPlate  ASA Prophylaxis  Lung CA Screening    Yocasta Hernandez MD  [facility]      ADDENDUM:   11/26/2018     I did receive the results of her stress test from [facility] which was normal.  I reviewed her risks for artery disease, the American Heart Association recommendations would suggest she is at increased risk but because of her age the risk of statins may outweigh the benefits.  I called and discussed with the patient the risks and benefits of treatment.  She has multiple other significant health problems that are likely to affect her life span and her preferences to stop the medication.  I agreed that is reasonable so the Lipitor will be discontinued.    Yocasta Hernandez MD

## 2019-01-15 DIAGNOSIS — I10 ESSENTIAL HYPERTENSION, BENIGN: ICD-10-CM

## 2019-01-17 RX ORDER — ATENOLOL 100 MG/1
TABLET ORAL
Qty: 90 TABLET | Refills: 1 | Status: SHIPPED | OUTPATIENT
Start: 2019-01-17 | End: 2019-06-27

## 2019-01-17 RX ORDER — AMLODIPINE BESYLATE 5 MG/1
TABLET ORAL
Qty: 135 TABLET | Refills: 1 | Status: SHIPPED | OUTPATIENT
Start: 2019-01-17 | End: 2019-06-27

## 2019-01-17 NOTE — TELEPHONE ENCOUNTER
Last OV 11/15/18    Prescription approved per [facility] Refill Protocol.

## 2019-04-19 ENCOUNTER — TRANSFERRED RECORDS (OUTPATIENT)
Dept: HEALTH INFORMATION MANAGEMENT | Facility: CLINIC | Age: 79
End: 2019-04-19

## 2019-06-03 ENCOUNTER — TRANSFERRED RECORDS (OUTPATIENT)
Dept: HEALTH INFORMATION MANAGEMENT | Facility: CLINIC | Age: 79
End: 2019-06-03

## 2019-06-12 ENCOUNTER — TRANSFERRED RECORDS (OUTPATIENT)
Dept: HEALTH INFORMATION MANAGEMENT | Facility: CLINIC | Age: 79
End: 2019-06-12

## 2019-06-27 DIAGNOSIS — I10 ESSENTIAL HYPERTENSION, BENIGN: ICD-10-CM

## 2019-06-27 RX ORDER — ATENOLOL 100 MG/1
TABLET ORAL
Qty: 90 TABLET | Refills: 1 | Status: SHIPPED | OUTPATIENT
Start: 2019-06-27 | End: 2019-11-29

## 2019-06-27 RX ORDER — AMLODIPINE BESYLATE 5 MG/1
TABLET ORAL
Qty: 135 TABLET | Refills: 1 | Status: SHIPPED | OUTPATIENT
Start: 2019-06-27 | End: 2019-12-31

## 2019-06-27 NOTE — TELEPHONE ENCOUNTER
"Requested Prescriptions   Pending Prescriptions Disp Refills     atenolol (TENORMIN) 100 MG tablet [Pharmacy Med Name: ATENOLOL 100MG      TAB] 90 tablet 1     Sig: TAKE 1 TABLET BY MOUTH ONCE DAILY   Last Written Prescription Date:  01/17/2019  Last Fill Quantity: 90,  # refills: 01   Last office visit: 11/15/2018 with prescribing provider:     Future Office Visit:      Beta-Blockers Protocol Passed - 6/27/2019  9:51 AM        Passed - Blood pressure under 140/90 in past 12 months     BP Readings from Last 3 Encounters:   11/15/18 120/60   09/26/18 146/70   09/05/18 100/70                 Passed - Patient is age 6 or older        Passed - Recent (12 mo) or future (30 days) visit within the authorizing provider's specialty     Patient had office visit in the last 12 months or has a visit in the next 30 days with authorizing provider or within the authorizing provider's specialty.  See \"Patient Info\" tab in inbasket, or \"Choose Columns\" in Meds & Orders section of the refill encounter.              Passed - Medication is active on med list        amLODIPine (NORVASC) 5 MG tablet [Pharmacy Med Name: AMLODIPINE 5MG TAB] 135 tablet 1     Sig: TAKE 1 & 1/2 (ONE & ONE-HALF) TABLETS BY MOUTH ONCE DAILY   Last Written Prescription Date:  01/17/2019  Last Fill Quantity: 135,  # refills: 01   Last office visit: 11/15/2018 with prescribing provider:     Future Office Visit:      Calcium Channel Blockers Protocol  Passed - 6/27/2019  9:51 AM        Passed - Blood pressure under 140/90 in past 12 months     BP Readings from Last 3 Encounters:   11/15/18 120/60   09/26/18 146/70   09/05/18 100/70                 Passed - Recent (12 mo) or future (30 days) visit within the authorizing provider's specialty     Patient had office visit in the last 12 months or has a visit in the next 30 days with authorizing provider or within the authorizing provider's specialty.  See \"Patient Info\" tab in inbasket, or \"Choose Columns\" in Meds & " Orders section of the refill encounter.              Passed - Medication is active on med list        Passed - Patient is age 18 or older        Passed - No active pregnancy on record        Passed - Normal serum creatinine on file in past 12 months     Recent Labs   Lab Test 11/15/18  0853  05/30/14  0942   CR 0.65   < >  --    CREAT  --   --  0.7    < > = values in this interval not displayed.             Passed - No positive pregnancy test in past 12 months

## 2019-06-27 NOTE — TELEPHONE ENCOUNTER
Prescription approved per [facility] Refill Protocol.  Elisa Carpenter RN

## 2019-11-29 ENCOUNTER — OFFICE VISIT (OUTPATIENT)
Dept: INTERNAL MEDICINE | Facility: CLINIC | Age: 79
End: 2019-11-29
Payer: COMMERCIAL

## 2019-11-29 VITALS
DIASTOLIC BLOOD PRESSURE: 68 MMHG | HEART RATE: 77 BPM | TEMPERATURE: 97.6 F | SYSTOLIC BLOOD PRESSURE: 128 MMHG | RESPIRATION RATE: 16 BRPM | BODY MASS INDEX: 43.83 KG/M2 | HEIGHT: 58 IN | OXYGEN SATURATION: 92 % | WEIGHT: 208.8 LBS

## 2019-11-29 DIAGNOSIS — Z00.00 ENCOUNTER FOR MEDICARE ANNUAL WELLNESS EXAM: Primary | ICD-10-CM

## 2019-11-29 DIAGNOSIS — C85.90 NON-HODGKIN'S LYMPHOMA, UNSPECIFIED BODY REGION, UNSPECIFIED NON-HODGKIN LYMPHOMA TYPE (H): ICD-10-CM

## 2019-11-29 DIAGNOSIS — J84.10 PULMONARY INTERSTITIAL FIBROSIS (H): ICD-10-CM

## 2019-11-29 DIAGNOSIS — I10 ESSENTIAL HYPERTENSION, BENIGN: ICD-10-CM

## 2019-11-29 DIAGNOSIS — E66.01 MORBID OBESITY, UNSPECIFIED OBESITY TYPE (H): ICD-10-CM

## 2019-11-29 DIAGNOSIS — R19.7 DIARRHEA, UNSPECIFIED TYPE: ICD-10-CM

## 2019-11-29 DIAGNOSIS — Z78.0 ASYMPTOMATIC POSTMENOPAUSAL STATUS: ICD-10-CM

## 2019-11-29 DIAGNOSIS — M85.80 OSTEOPENIA, UNSPECIFIED LOCATION: ICD-10-CM

## 2019-11-29 DIAGNOSIS — M06.9 RHEUMATOID ARTHRITIS INVOLVING MULTIPLE SITES, UNSPECIFIED RHEUMATOID FACTOR PRESENCE: ICD-10-CM

## 2019-11-29 PROCEDURE — G0008 ADMIN INFLUENZA VIRUS VAC: HCPCS | Performed by: INTERNAL MEDICINE

## 2019-11-29 PROCEDURE — 90662 IIV NO PRSV INCREASED AG IM: CPT | Performed by: INTERNAL MEDICINE

## 2019-11-29 PROCEDURE — 99397 PER PM REEVAL EST PAT 65+ YR: CPT | Mod: 25 | Performed by: INTERNAL MEDICINE

## 2019-11-29 PROCEDURE — 99213 OFFICE O/P EST LOW 20 MIN: CPT | Mod: 25 | Performed by: INTERNAL MEDICINE

## 2019-11-29 PROCEDURE — 82043 UR ALBUMIN QUANTITATIVE: CPT | Performed by: INTERNAL MEDICINE

## 2019-11-29 PROCEDURE — 99207 C PAF COMPLETED  NO CHARGE: CPT | Mod: 25 | Performed by: INTERNAL MEDICINE

## 2019-11-29 RX ORDER — ATENOLOL 100 MG/1
100 TABLET ORAL DAILY
Qty: 90 TABLET | Refills: 3 | Status: SHIPPED | OUTPATIENT
Start: 2019-11-29 | End: 2020-12-04

## 2019-11-29 RX ORDER — PANTOPRAZOLE SODIUM 40 MG/1
40 TABLET, DELAYED RELEASE ORAL DAILY
Qty: 90 TABLET | Refills: 3 | Status: CANCELLED | OUTPATIENT
Start: 2019-11-29

## 2019-11-29 RX ORDER — LOSARTAN POTASSIUM 100 MG/1
100 TABLET ORAL DAILY
Qty: 90 TABLET | Refills: 3 | Status: SHIPPED | OUTPATIENT
Start: 2019-11-29 | End: 2020-12-04

## 2019-11-29 RX ORDER — IBANDRONATE SODIUM 150 MG/1
150 TABLET, FILM COATED ORAL
Qty: 3 TABLET | Refills: 3 | Status: SHIPPED | OUTPATIENT
Start: 2019-11-29 | End: 2020-12-04

## 2019-11-29 ASSESSMENT — MIFFLIN-ST. JEOR: SCORE: 1315.83

## 2019-11-29 NOTE — NURSING NOTE
"/68   Pulse 77   Temp 97.6  F (36.4  C) (Oral)   Resp 16   Ht 1.48 m (4' 10.25\")   Wt 94.7 kg (208 lb 12.8 oz)   LMP  (LMP Unknown)   SpO2 92%   BMI 43.27 kg/m    Patient in for annual Medicare Visit.  France Bravo CMA    "

## 2019-11-29 NOTE — PROGRESS NOTES
"  SUBJECTIVE:   Nicolás Wyman is a 79 year old female who presents for Preventive Visit.  Are you in the first 12 months of your Medicare Part B coverage?  No    Physical Health:    In general, how would you rate your overall physical health? fair    Outside of work, how many days during the week do you exercise? 2-3 days/week    Outside of work, approximately how many minutes a day do you exercise?15-30 minutes    If you drink alcohol do you typically have >3 drinks per day or >7 drinks per week? No    Do you usually eat at least 4 servings of fruit and vegetables a day, include whole grains & fiber and avoid regularly eating high fat or \"junk\" foods? Yes    Do you have any problems taking medications regularly?  No    Do you have any side effects from medications? none    Needs assistance for the following daily activities: no assistance needed    Which of the following safety concerns are present in your home?  none identified     Hearing impairment: Yes, Difficulty understanding soft or whispered speech.    In the past 6 months, have you been bothered by leaking of urine? yes    Mental Health:    In general, how would you rate your overall mental or emotional health? good  PHQ-2 Score: 0    Do you feel safe in your environment? Yes    Have you ever done Advance Care Planning? (For example, a Health Directive, POLST, or a discussion with a medical provider or your loved ones about your wishes): Yes, patient states has an Advance Care Planning document and will bring a copy to the clinic.    Additional concerns to address?  No    Fall risk:  Fallen 2 or more times in the past year?: No  Any fall with injury in the past year?: No    Cognitive Screenin) Repeat 3 items (Leader, Season, Table)    2) Clock draw: NORMAL  3) 3 item recall: Recalls 3 objects  Results: 3 items recalled: COGNITIVE IMPAIRMENT LESS LIKELY    Mini-CogTM Copyright S Martir. Licensed by the author for use in [facility]; " reprinted with permission (soob@.[email or web address]). All rights reserved.      Do you have sleep apnea, excessive snoring or daytime drowsiness?: no      Reviewed and updated as needed this visit by clinical staff  Reviewed and updated as needed this visit by Provider        Problems:   1.  Lymphoma: She had a cutaneous outbreak on her left lateral thigh and has had radiation therapy with good response.  She continues to follow with oncology [facility].  2.  Pulmonary fibrosis: She has had gradually worsening dyspnea on exertion but still very slow progress.  She follows with pulmonary for this.  3.  Rheumatoid arthritis: Doing well with good control of her joint pain.  4.  Morbid obesity: Weight is overall stable  5.  Hypertension: Blood pressures been well controlled.    Current concerns:  She has been aware of a little bit of loose stools for 3 to 4 months.  These are not watery, there is no significant pain, cramping or bleeding.  She had been on some antibiotics over the past winter but is not clear that it started immediately after taking any.  She goes about 3-4 times a day.  She is not really not bothered, it is been stable.    Social History     Tobacco Use     Smoking status: Never Smoker     Smokeless tobacco: Never Used   Substance Use Topics     Alcohol use: No                           Current providers sharing in care for this patient include:   Patient Care Team:  Yocasta Hernandez MD as PCP - General  Yocasta Hernandez MD as Assigned PCP    The following health maintenance items are reviewed in Epic and correct as of today:  Health Maintenance   Topic Date Due     ZOSTER IMMUNIZATION (2 of 3) 03/08/2011     PHQ-2  01/01/2019     INFLUENZA VACCINE (1) 09/01/2019     FALL RISK ASSESSMENT  09/05/2019     MICROALBUMIN  11/15/2019     MEDICARE ANNUAL WELLNESS VISIT  11/15/2019     ADVANCE CARE PLANNING  09/26/2023     DTAP/TDAP/TD IMMUNIZATION (3 - Td) 11/13/2025     DEXA  Completed     PNEUMOCOCCAL IMMUNIZATION 65+  HIGH/HIGHEST RISK  Completed     IPV IMMUNIZATION  Aged Out     MENINGITIS IMMUNIZATION  Aged Out     Patient Active Problem List   Diagnosis     Essential hypertension, benign     Rheumatoid arthritis (HCC)     Disorder of bone and cartilage     CARDIOVASCULAR SCREENING; LDL GOAL LESS THAN 130     Advanced directives, counseling/discussion     Health Care Home     Gait disturbance     Non Hodgkin's lymphoma (H)     Pulmonary interstitial fibrosis (H)     JIGNESH (obstructive sleep apnea)     Morbid obesity, unspecified obesity type (H)     Gastroesophageal reflux disease without esophagitis     Current Outpatient Medications   Medication Sig Dispense Refill     acetaminophen (TYLENOL) 500 MG tablet Take 500-1,000 mg by mouth every 4 hours as needed for mild pain       acetylcysteine (N-ACETYL CYSTEINE) 600 MG CAPS capsule Take 1 capsule (600 mg) by mouth daily       Acidophilus Lactobacillus CAPS Take 1 capsule by mouth daily 30 capsule 5     albuterol (2.5 MG/3ML) 0.083% nebulizer solution Take 1 vial (2.5 mg) by nebulization every 6 hours as needed 100 mL 5     albuterol (PROAIR HFA, PROVENTIL HFA, VENTOLIN HFA) 108 (90 BASE) MCG/ACT inhaler Inhale 2 puffs into the lungs every 6 hours as needed for shortness of breath / dyspnea 1 Inhaler 5     amLODIPine (NORVASC) 5 MG tablet TAKE 1 & 1/2 (ONE & ONE-HALF) TABLETS BY MOUTH ONCE DAILY 135 tablet 1     aspirin 81 MG tablet Take 1 tablet (81 mg) by mouth daily 30 tablet      atenolol (TENORMIN) 100 MG tablet TAKE 1 TABLET BY MOUTH ONCE DAILY 90 tablet 1     Calcium Carb-Cholecalciferol (CALCIUM + D3) 600-200 MG-UNIT TABS        cholecalciferol (VITAMIN D3) 1000 UNIT tablet Take 1 tablet (1,000 Units) by mouth daily 100 tablet 3     doxycycline (VIBRAMYCIN) 100 MG capsule Take 1 capsule by mouth 2 times daily. 180 capsule 1     ferrous sulfate (IRON) 325 (65 Fe) MG tablet Take 1 tablet (325 mg) by mouth daily (with breakfast) 30 tablet 2     Glucosamine 500 MG CAPS Take 1  "capful by mouth daily.       hydroxychloroquine (PLAQUENIL) 200 MG tablet Take 1 tablet by mouth 2 times daily. 60 tablet 1     IBANdronate (BONIVA) 150 MG tablet Take 1 tablet (150 mg) by mouth every 30 days Take 60 minutes before am meal with 8 oz. water. Remain upright for 60 minutes. 3 tablet 3     Ibuprofen (ADVIL PO)        levOCARNitine (CARNITOR) 330 MG tablet 3 tabs bid       losartan (COZAAR) 100 MG tablet Take 1 tablet (100 mg) by mouth daily 90 tablet 3     PREDNISONE PO Take 5 mg by mouth daily       traMADol (ULTRAM) 50 MG tablet Take 100 mg by mouth            ROS:  CONSTITUTIONAL: NEGATIVE for fever, chills, change in weight  INTEGUMENTARY/SKIN: NEGATIVE for worrisome rashes, moles or lesions  EYES: NEGATIVE for vision changes or irritation  ENT/MOUTH: NEGATIVE for ear, mouth and throat problems  RESP:SOB/dyspnea  BREAST: NEGATIVE for masses, tenderness or discharge  CV: NEGATIVE for chest pain, palpitations or peripheral edema  GI: diarrhea as above  : NEGATIVE for frequency, dysuria, or hematuria  MUSCULOSKELETAL: NEGATIVE for significant arthralgias or myalgia  NEURO: NEGATIVE for weakness, dizziness or paresthesias  ENDOCRINE: NEGATIVE for temperature intolerance, skin/hair changes  HEME: NEGATIVE for bleeding problems  PSYCHIATRIC: NEGATIVE for changes in mood or affect    OBJECTIVE:   /68   Pulse 77   Temp 97.6  F (36.4  C) (Oral)   Resp 16   Ht 1.48 m (4' 10.25\")   Wt 94.7 kg (208 lb 12.8 oz)   LMP  (LMP Unknown)   SpO2 92%   BMI 43.27 kg/m   Estimated body mass index is 43.27 kg/m  as calculated from the following:    Height as of this encounter: 1.48 m (4' 10.25\").    Weight as of this encounter: 94.7 kg (208 lb 12.8 oz).  EXAM:     Patient alert, in no acute distress  /68   Pulse 77   Temp 97.6  F (36.4  C) (Oral)   Resp 16   Ht 1.48 m (4' 10.25\")   Wt 94.7 kg (208 lb 12.8 oz)   LMP  (LMP Unknown)   SpO2 92%   BMI 43.27 kg/m      HEENT: extraocular movements " are intact, pupils equal and reactive to light and accommodation, TMs clear, oropharynx clear  NECK: Neck supple. No adenopathy. Thyroid symmetric, normal size,, Carotids without bruits.  PULMONARY: few crackle bases  CARDIAC: Regular S1, S2 with occasional premature beat, grade 2/6 systolic ejection murmur along the upper sternal border  PULSES: 2/2 throughout  BACK: no spinal or CVAT  ABDOMINAL: Soft, nontender.  Normal bowel sounds.  No hepatosplenomegaly or abnormal masses  BREAST: not able to do      ASSESSMENT / PLAN:   1. Encounter for Medicare annual wellness exam  Advised about the shingles vaccine    2. Non-Hodgkin's lymphoma, unspecified body region, unspecified non-Hodgkin lymphoma type (H)  Clinically stable, follow-up with oncology    3. Rheumatoid arthritis involving multiple sites, unspecified rheumatoid factor presence (H)  Stable, follow-up rheumatology    4. Pulmonary interstitial fibrosis (H)  Gradually declining breathing, follow-up pulmonary    5. Morbid obesity, unspecified obesity type (H)  Weight is overall stable, continue working on as much as possible    6. Essential hypertension, benign  Well-controlled, continue medication  - atenolol (TENORMIN) 100 MG tablet; Take 1 tablet (100 mg) by mouth daily  Dispense: 90 tablet; Refill: 3  - losartan (COZAAR) 100 MG tablet; Take 1 tablet (100 mg) by mouth daily  Dispense: 90 tablet; Refill: 3  - Albumin Random Urine Quantitative with Creat Ratio  - OFFICE/OUTPT VISIT,EST,LEVL III    7. Osteopenia, unspecified location  Stable, continue medication, due for bone density  - IBANdronate (BONIVA) 150 MG tablet; Take 1 tablet (150 mg) by mouth every 30 days Take 60 minutes before am meal with 8 oz. water. Remain upright for 60 minutes.  Dispense: 3 tablet; Refill: 3  - OFFICE/OUTPT VISIT,EST,LEVL III    8. Diarrhea, unspecified type  Etiology overall unclear, talked about possibility of doing C. difficile testing but she would prefer not to try to make  "collections at this time.  If her symptoms are worsening, she should notify me, otherwise over-the-counter as needed  - OFFICE/OUTPT VISIT,COLIN RICO III    9. Asymptomatic postmenopausal status    - DX Hip/Pelvis/Spine; Future    COUNSELING:  Reviewed preventive health counseling, as reflected in patient instructions    Estimated body mass index is 43.27 kg/m  as calculated from the following:    Height as of this encounter: 1.48 m (4' 10.25\").    Weight as of this encounter: 94.7 kg (208 lb 12.8 oz).    Weight management plan: Discussed healthy diet and exercise guidelines     reports that she has never smoked. She has never used smokeless tobacco.      Appropriate preventive services were discussed with this patient, including applicable screening as appropriate for cardiovascular disease, diabetes, osteopenia/osteoporosis, and glaucoma.  As appropriate for age/gender, discussed screening for colorectal cancer, prostate cancer, breast cancer, and cervical cancer. Checklist reviewing preventive services available has been given to the patient.    Reviewed patients plan of care and provided an AVS. The Basic Care Plan (routine screening as documented in Health Maintenance) for Baker meets the Care Plan requirement. This Care Plan has been established and reviewed with the Patient.    Counseling Resources:  ATP IV Guidelines  Pooled Cohorts Equation Calculator  Breast Cancer Risk Calculator  FRAX Risk Assessment  ICSI Preventive Guidelines  Dietary Guidelines for Americans, 2010  USDA's MyPlate  ASA Prophylaxis  Lung CA Screening    Yocasta Hernandez MD  [facility]  "

## 2019-11-29 NOTE — PATIENT INSTRUCTIONS
Shingrix is the new shingles vaccine. Call insurance to find out if covered, whether covered at a pharmacy or doctor's office and the copay or check with pharmacy.     Call me if the diarrhea gets worse and we can order some stool tests.

## 2019-11-30 LAB
CREAT UR-MCNC: 24 MG/DL
MICROALBUMIN UR-MCNC: 17 MG/L
MICROALBUMIN/CREAT UR: 70.12 MG/G CR (ref 0–25)

## 2019-12-30 DIAGNOSIS — I10 ESSENTIAL HYPERTENSION, BENIGN: ICD-10-CM

## 2019-12-31 RX ORDER — AMLODIPINE BESYLATE 5 MG/1
TABLET ORAL
Qty: 135 TABLET | Refills: 0 | Status: SHIPPED | OUTPATIENT
Start: 2019-12-31 | End: 2020-03-22

## 2019-12-31 NOTE — TELEPHONE ENCOUNTER
Prescription approved per [facility] Refill Protocol.  Alma Reynoso RN

## 2020-03-21 DIAGNOSIS — I10 ESSENTIAL HYPERTENSION, BENIGN: ICD-10-CM

## 2020-03-22 RX ORDER — AMLODIPINE BESYLATE 5 MG/1
TABLET ORAL
Qty: 135 TABLET | Refills: 0 | Status: SHIPPED | OUTPATIENT
Start: 2020-03-22 | End: 2020-06-10

## 2020-03-22 NOTE — TELEPHONE ENCOUNTER
"    Requested Prescriptions   Pending Prescriptions Disp Refills     amLODIPine (NORVASC) 5 MG tablet [Pharmacy Med Name: amLODIPine Besylate 5 MG Oral Tablet] 135 tablet 0     Sig: TAKE 1 & 1/2 (ONE & ONE-HALF) TABLETS BY MOUTH ONCE DAILY       Calcium Channel Blockers Protocol  Failed - 3/21/2020 11:40 AM        Failed - Normal serum creatinine on file in past 12 months     Recent Labs   Lab Test 11/15/18  0853  05/30/14  0942   CR 0.65   < >  --    CREAT  --   --  0.7    < > = values in this interval not displayed.       Ok to refill medication if creatinine is low          Passed - Blood pressure under 140/90 in past 12 months     BP Readings from Last 3 Encounters:   11/29/19 128/68   11/15/18 120/60   09/26/18 146/70                 Passed - Recent (12 mo) or future (30 days) visit within the authorizing provider's specialty     Patient has had an office visit with the authorizing provider or a provider within the authorizing providers department within the previous 12 mos or has a future within next 30 days. See \"Patient Info\" tab in inbasket, or \"Choose Columns\" in Meds & Orders section of the refill encounter.              Passed - Medication is active on med list        Passed - Patient is age 18 or older        Passed - No active pregnancy on record        Passed - No positive pregnancy test in past 12 months             "

## 2020-09-18 DIAGNOSIS — I10 ESSENTIAL HYPERTENSION, BENIGN: ICD-10-CM

## 2020-09-22 RX ORDER — AMLODIPINE BESYLATE 5 MG/1
TABLET ORAL
Qty: 135 TABLET | Refills: 0 | Status: ON HOLD | OUTPATIENT
Start: 2020-09-22 | End: 2021-01-01

## 2020-09-22 NOTE — TELEPHONE ENCOUNTER
Pending Prescriptions:                       Disp   Refills    amLODIPine (NORVASC) 5 MG tablet [Pharmacy*135 ta*0        Sig: TAKE 1 & 1/2 (ONE & ONE-HALF) TABLETS BY MOUTH ONCE           DAILY    Routing refill request to provider for review/approval because:  Patient fails protocol

## 2020-09-22 NOTE — TELEPHONE ENCOUNTER
Routing refill request to provider for review/approval because:  Labs not current:  Creatinine

## 2020-10-30 ENCOUNTER — ALLIED HEALTH/NURSE VISIT (OUTPATIENT)
Dept: FAMILY MEDICINE | Facility: CLINIC | Age: 80
End: 2020-10-30
Payer: COMMERCIAL

## 2020-10-30 DIAGNOSIS — Z23 NEED FOR PROPHYLACTIC VACCINATION AND INOCULATION AGAINST INFLUENZA: Primary | ICD-10-CM

## 2020-10-30 PROCEDURE — 99207 PR NO CHARGE NURSE ONLY: CPT

## 2020-10-30 PROCEDURE — 90662 IIV NO PRSV INCREASED AG IM: CPT

## 2020-10-30 PROCEDURE — G0008 ADMIN INFLUENZA VIRUS VAC: HCPCS

## 2020-11-09 ENCOUNTER — APPOINTMENT (OUTPATIENT)
Dept: GENERAL RADIOLOGY | Facility: CLINIC | Age: 80
End: 2020-11-09
Attending: EMERGENCY MEDICINE
Payer: COMMERCIAL

## 2020-11-09 ENCOUNTER — HOSPITAL ENCOUNTER (EMERGENCY)
Facility: CLINIC | Age: 80
Discharge: HOME | End: 2020-11-09
Attending: EMERGENCY MEDICINE | Admitting: EMERGENCY MEDICINE
Payer: COMMERCIAL

## 2020-11-09 VITALS
RESPIRATION RATE: 16 BRPM | BODY MASS INDEX: 41.44 KG/M2 | OXYGEN SATURATION: 94 % | DIASTOLIC BLOOD PRESSURE: 73 MMHG | HEART RATE: 77 BPM | SYSTOLIC BLOOD PRESSURE: 149 MMHG | WEIGHT: 200 LBS | TEMPERATURE: 98.9 F

## 2020-11-09 DIAGNOSIS — M25.551 HIP PAIN, RIGHT: ICD-10-CM

## 2020-11-09 PROCEDURE — 72170 X-RAY EXAM OF PELVIS: CPT

## 2020-11-09 PROCEDURE — 73552 X-RAY EXAM OF FEMUR 2/>: CPT | Mod: RT

## 2020-11-09 PROCEDURE — 99284 EMERGENCY DEPT VISIT MOD MDM: CPT

## 2020-11-09 ASSESSMENT — ENCOUNTER SYMPTOMS: ARTHRALGIAS: 1

## 2020-11-09 NOTE — ED AVS SNAPSHOT
[facility]  [address]  [facility] [address]  Phone: [phone]  Fax: [phone]                                    Nicolás Wyman   MRN: [id number]    Department: [facility]   Date of Visit: 11/9/2020           After Visit Summary Signature Page    I have received my discharge instructions, and my questions have been answered. I have discussed any challenges I see with this plan with the nurse or doctor.    ..........................................................................................................................................  Patient/Patient Representative Signature      ..........................................................................................................................................  Patient Representative Print Name and Relationship to Patient    ..................................................               ................................................  Date                                   Time    ..........................................................................................................................................  Reviewed by Signature/Title    ...................................................              ..............................................  Date                                               Time          22EPIC Rev 08/18

## 2020-11-09 NOTE — ED TRIAGE NOTES
Pt here with atraumatic R hip pain for past 3 weeks. Pain with ambulation. Normally uses cane. No loss of bowel/bladder. CMS intact. Hx garcía to R femur. Weight bearing as tolerated. Last took tylenol and tramadol @ 0800. ABC intact. A&O x4.

## 2020-11-09 NOTE — ED PROVIDER NOTES
History     Chief Complaint:  Hip Pain       The history is provided by the patient and a relative.     Nicolás Wyman is a 80 year old female with a history of osteoporosis, rheumatoid arthritis, and bilateral total knee arthroplasty among others listed below who presents for evaluation of right hip pain. The patient reports that her hip pain has been ongoing for approximately two weeks and is exacerbated when she moves from a sitting to a standing position. She has been seen by Orthopedics at the [facility] who think that the patient may have an atypical femur fracture and recommended further imaging. Here, the patient reports that she has been taking tramadol and tylenol with some relief of pain. She denies any recent injuries or falls.    Allergies:  ACE inhibitors  Latex  Liquid adhesive  Adhesive tape      Medications:   Boniva  Acetylcysteine  Albuterol neb solution  Albuterol inhaler  Amlodipine  Aspirin 81 mg  Atenolol  Ferrous sulfate  Glucosamine  Plaquenil   Levocarnitine  Prednisone  Tramadol     Medical History:   Discoid lupus  Osteoporosis  Glaucoma  Hypertension  Idiopathic interstitial pneumonia  Non hodgkin's lymphoma  Rheumatoid arthritis  Infected prosthetic knee joint  Pulmonary interstitial fibrosis  Gait disturbance  Obstructive sleep apnea  Morbid obesity    GERD    Surgical History   Total knee arthroplasty - right  Arthroplasty revision knee - right  Hysterectomy   Exchange poly component arthroplasty knee - right  Correct bunion, simple  Cholecystectomy   I&D knee, combined x2  Right TKA removal     Family History:   Mother -  Heart disease, hypertension, macular degeneration    Social History:  The patient was accompanied to the ED by her daughter.  Smoking Status: Never  Smokeless Tobacco: Never   Alcohol Use: No  Drug Use: No   Marital Status:   PCP: Ycoasta Hernandez        Review of Systems   Musculoskeletal: Positive for arthralgias (right hip).   All other systems reviewed and  are negative.    Physical Exam     Patient Vitals for the past 24 hrs:   BP Temp Temp src Pulse Resp SpO2 Weight   20 1248 (!) 149/73 98.9  F (37.2  C) Oral 77 16 94 % 90.7 kg (200 lb)          Physical Exam  General: Patient is alert and cooperative.  HENT:  Normal appearance.   Eyes: EOMI. Normal conjunctiva.  Neck:  Normal range of motion and appearance.   Cardiovascular:  Normal rate.   Pulmonary/Chest:  Effort normal.     Musculoskeletal: Normal range of motion. No edema or tenderness. RLE. Old surgical scars.  Neurological: oriented, normal strength, sensation, and coordination.   Skin: Warm and dry. No rash or bruising.   Psychiatric: Normal mood and affect. Normal behavior and judgement.      Emergency Department Course     Imaging:  Radiology results were communicated with the patient and family who voiced understanding of the findings.    XR Femur Right 2 Views:  No fracture identified.  Reading per radiology.    XR Pelvis 1/2 views:  No fracture identified.  Reading per radiology.    Emergency Department Course:    Nursing notes and vitals reviewed.    1340 I performed an exam of the patient as documented above.     1513 The patient was sent for XR while in the emergency department, results above.      Patient rechecked and updated following imaging results.      Findings and plan explained to the Patient and daughter. Patient discharged home with instructions regarding supportive care, medications, and reasons to return. The importance of close follow-up was reviewed.      Impression & Plan     Medical Decision Makin-year-old female with a history of osteoporosis and prior bilateral total knee arthroplasties referred by clinic for evaluation of some atraumatic hip discomfort.  Been worsening over the past couple weeks.  There is no history of trauma.  Exam shows no swelling or deformity.  Clinic was concerned about the possibility of an occult fracture given her age hardware and osteoporosis.   Fortunately x-rays of the right hip and femur are negative.  She is reassured and instructed to follow-up with her clinic or orthopedist for further management as needed.    Diagnosis:     ICD-10-CM    1. Hip pain, right  M25.551       Disposition:  Discharged to home.    Discharge Medications:  New Prescriptions    No medications on file     Scribe Disclosure:  Prisca MORROW, am serving as a scribe at 1:40 PM on 11/9/2020 to document services personally performed by Alberto Vasquez MD based on my observations and the provider's statements to me.      Alberto Vasquez MD  11/10/20 1013

## 2020-12-04 ENCOUNTER — OFFICE VISIT (OUTPATIENT)
Dept: INTERNAL MEDICINE | Facility: CLINIC | Age: 80
End: 2020-12-04
Payer: COMMERCIAL

## 2020-12-04 VITALS
WEIGHT: 205.5 LBS | BODY MASS INDEX: 42.58 KG/M2 | OXYGEN SATURATION: 96 % | HEART RATE: 83 BPM | RESPIRATION RATE: 16 BRPM | TEMPERATURE: 98.5 F | SYSTOLIC BLOOD PRESSURE: 138 MMHG | DIASTOLIC BLOOD PRESSURE: 67 MMHG

## 2020-12-04 DIAGNOSIS — M06.9 RHEUMATOID ARTHRITIS INVOLVING MULTIPLE SITES, UNSPECIFIED WHETHER RHEUMATOID FACTOR PRESENT (H): ICD-10-CM

## 2020-12-04 DIAGNOSIS — M85.80 OSTEOPENIA, UNSPECIFIED LOCATION: ICD-10-CM

## 2020-12-04 DIAGNOSIS — I10 ESSENTIAL HYPERTENSION, BENIGN: ICD-10-CM

## 2020-12-04 DIAGNOSIS — Z00.00 ENCOUNTER FOR ANNUAL WELLNESS EXAM IN MEDICARE PATIENT: Primary | ICD-10-CM

## 2020-12-04 DIAGNOSIS — R07.89 CHEST WALL PAIN: ICD-10-CM

## 2020-12-04 DIAGNOSIS — J84.10 PULMONARY INTERSTITIAL FIBROSIS (H): ICD-10-CM

## 2020-12-04 DIAGNOSIS — E66.01 MORBID OBESITY, UNSPECIFIED OBESITY TYPE (H): ICD-10-CM

## 2020-12-04 DIAGNOSIS — C85.90 NON-HODGKIN'S LYMPHOMA, UNSPECIFIED BODY REGION, UNSPECIFIED NON-HODGKIN LYMPHOMA TYPE (H): ICD-10-CM

## 2020-12-04 PROCEDURE — 99214 OFFICE O/P EST MOD 30 MIN: CPT | Mod: 25 | Performed by: INTERNAL MEDICINE

## 2020-12-04 PROCEDURE — 99397 PER PM REEVAL EST PAT 65+ YR: CPT | Performed by: INTERNAL MEDICINE

## 2020-12-04 RX ORDER — IBANDRONATE SODIUM 150 MG/1
150 TABLET, FILM COATED ORAL
Qty: 3 TABLET | Refills: 3 | Status: SHIPPED | OUTPATIENT
Start: 2020-12-04

## 2020-12-04 RX ORDER — LOSARTAN POTASSIUM 100 MG/1
100 TABLET ORAL DAILY
Qty: 90 TABLET | Refills: 3 | Status: SHIPPED | OUTPATIENT
Start: 2020-12-04

## 2020-12-04 RX ORDER — ATENOLOL 100 MG/1
100 TABLET ORAL DAILY
Qty: 90 TABLET | Refills: 3 | Status: SHIPPED | OUTPATIENT
Start: 2020-12-04

## 2020-12-04 RX ORDER — TRAMADOL HYDROCHLORIDE 50 MG/1
50 TABLET ORAL EVERY 8 HOURS PRN
Qty: 30 TABLET | Refills: 0 | Status: SHIPPED | OUTPATIENT
Start: 2020-12-04 | End: 2020-12-17

## 2020-12-04 ASSESSMENT — ENCOUNTER SYMPTOMS
ABDOMINAL PAIN: 1
ARTHRALGIAS: 1
CONSTIPATION: 1
SHORTNESS OF BREATH: 1
MYALGIAS: 1
BREAST MASS: 0
DIZZINESS: 1

## 2020-12-04 ASSESSMENT — ACTIVITIES OF DAILY LIVING (ADL): CURRENT_FUNCTION: NO ASSISTANCE NEEDED

## 2020-12-04 NOTE — PROGRESS NOTES
"SUBJECTIVE:   Nicolás Wyman is a 80 year old female who presents for Preventive Visit.      Patient has been advised of split billing requirements and indicates understanding: Yes   Are you in the first 12 months of your Medicare coverage?  No    Healthy Habits:     In general, how would you rate your overall health?  Fair    Frequency of exercise:  None    Do you usually eat at least 4 servings of fruit and vegetables a day, include whole grains    & fiber and avoid regularly eating high fat or \"junk\" foods?  Yes    Taking medications regularly:  Yes    Medication side effects:  Muscle aches    Ability to successfully perform activities of daily living:  No assistance needed    Home Safety:  No safety concerns identified    Hearing Impairment:  Difficulty following a conversation in a noisy restaurant or crowded room, feel that people are mumbling or not speaking clearly, need to ask people to speak up or repeat themselves and difficulty understanding soft or whispered speech    In the past 6 months, have you been bothered by leaking of urine?  No    In general, how would you rate your overall mental or emotional health?  Fair      PHQ-2 Total Score: 2    Additional concerns today:  No    Do you feel safe in your environment? Yes    Have you ever done Advance Care Planning? (For example, a Health Directive, POLST, or a discussion with a medical provider or your loved ones about your wishes): Yes, advance care planning is on file.      Fall risk  Fallen 2 or more times in the past year?: Yes  Any fall with injury in the past year?: Yes      1) Repeat 3 items (Leader, Season, Table)    2) Clock draw: NORMAL  3) 3 item recall: Recalls 3 objects  Results: 3 items recalled: COGNITIVE IMPAIRMENT LESS LIKELY    Mini-CogTM Copyright DAVID Mcmahan. Licensed by the author for use in [facility]; reprinted with permission (katy@.[facility]). All rights reserved.      Do you have sleep apnea, excessive snoring or daytime " drowsiness?: yes    Reviewed and updated as needed this visit by clinical staff                  Problems:  1. Hypertension: Blood pressures checked occasionally, well controlled.  2. Pulmonary fibrosis: Gradual worsening of dyspnea but very slow.  3. Rheumatoid arthritis: Continues to follow with rheumatology, overall fairly stable.  4. Morbid obesity: Weight is overall fairly stable.  5. Non-Hodgkin's lymphoma: Clinically stable, following with oncology.    Current concerns:  1. She has right hip bursitis, did have an injection, uses tramadol occasionally. It does bother her at night. She is going to undergo physical therapy for this.   2. She had fallen Tuesday night after some dizziness. Little bruise around the chest, shoulder, does not think she had a fracture as she is able to move the arm but didn't want me to check it.    Patient Active Problem List   Diagnosis     Essential hypertension, benign     Rheumatoid arthritis (HCC)     Disorder of bone and cartilage     CARDIOVASCULAR SCREENING; LDL GOAL LESS THAN 130     Advanced directives, counseling/discussion     Health Care Home     Gait disturbance     Non Hodgkin's lymphoma (H)     Pulmonary interstitial fibrosis (H)     JIGNESH (obstructive sleep apnea)     Morbid obesity, unspecified obesity type (H)     Gastroesophageal reflux disease without esophagitis     Current Outpatient Medications   Medication Sig Dispense Refill     acetaminophen (TYLENOL) 500 MG tablet Take 500-1,000 mg by mouth every 4 hours as needed for mild pain       acetylcysteine (N-ACETYL CYSTEINE) 600 MG CAPS capsule Take 1 capsule (600 mg) by mouth daily       Acidophilus Lactobacillus CAPS Take 1 capsule by mouth daily 30 capsule 5     albuterol (2.5 MG/3ML) 0.083% nebulizer solution Take 1 vial (2.5 mg) by nebulization every 6 hours as needed 100 mL 5     albuterol (PROAIR HFA, PROVENTIL HFA, VENTOLIN HFA) 108 (90 BASE) MCG/ACT inhaler Inhale 2 puffs into the lungs every 6 hours as  needed for shortness of breath / dyspnea 1 Inhaler 5     amLODIPine (NORVASC) 5 MG tablet TAKE 1 & 1/2 (ONE & ONE-HALF) TABLETS BY MOUTH ONCE DAILY 135 tablet 0     aspirin 81 MG tablet Take 1 tablet (81 mg) by mouth daily 30 tablet      atenolol (TENORMIN) 100 MG tablet Take 1 tablet (100 mg) by mouth daily 90 tablet 3     Calcium Carb-Cholecalciferol (CALCIUM + D3) 600-200 MG-UNIT TABS        cholecalciferol (VITAMIN D3) 1000 UNIT tablet Take 1 tablet (1,000 Units) by mouth daily 100 tablet 3     doxycycline (VIBRAMYCIN) 100 MG capsule Take 1 capsule by mouth 2 times daily. 180 capsule 1     ferrous sulfate (IRON) 325 (65 Fe) MG tablet Take 1 tablet (325 mg) by mouth daily (with breakfast) 30 tablet 2     Glucosamine 500 MG CAPS Take 1 capful by mouth daily.       hydroxychloroquine (PLAQUENIL) 200 MG tablet Take 1 tablet by mouth 2 times daily. 60 tablet 1     IBANdronate (BONIVA) 150 MG tablet Take 1 tablet (150 mg) by mouth every 30 days Take 60 minutes before am meal with 8 oz. water. Remain upright for 60 minutes. 3 tablet 3     Ibuprofen (ADVIL PO)        levOCARNitine (CARNITOR) 330 MG tablet 3 tabs bid       losartan (COZAAR) 100 MG tablet Take 1 tablet (100 mg) by mouth daily 90 tablet 3     PREDNISONE PO Take 5 mg by mouth daily       traMADol (ULTRAM) 50 MG tablet Take 100 mg by mouth            Reviewed and updated as needed this visit by Provider                Social History     Tobacco Use     Smoking status: Never Smoker     Smokeless tobacco: Never Used   Substance Use Topics     Alcohol use: No     If you drink alcohol do you typically have >3 drinks per day or >7 drinks per week? No    Alcohol Use 12/4/2020   Prescreen: >3 drinks/day or >7 drinks/week? No   Prescreen: >3 drinks/day or >7 drinks/week? -   No flowsheet data found.            Current providers sharing in care for this patient include:   Patient Care Team:  Yocasta Hernandez MD as PCP - General  Yocasta Hernandez MD as Assigned PCP    The  following health maintenance items are reviewed in Epic and correct as of today:  Health Maintenance   Topic Date Due     ZOSTER IMMUNIZATION (2 of 3) 03/08/2011     MICROALBUMIN  11/29/2020     FALL RISK ASSESSMENT  11/29/2020     MEDICARE ANNUAL WELLNESS VISIT  11/29/2020     ADVANCE CARE PLANNING  11/29/2024     DTAP/TDAP/TD IMMUNIZATION (3 - Td) 11/13/2025     DEXA  07/24/2030     PHQ-2  Completed     INFLUENZA VACCINE  Completed     Pneumococcal Vaccine: Pediatrics (0 to 5 Years) and At-Risk Patients (6 to 64 Years)  Completed     Pneumococcal Vaccine: 65+ Years  Completed     IPV IMMUNIZATION  Aged Out     MENINGITIS IMMUNIZATION  Aged Out     HEPATITIS B IMMUNIZATION  Aged Out     LIPID  Discontinued         Review of Systems   HENT: Positive for hearing loss.    Respiratory: Positive for shortness of breath.    Cardiovascular: Positive for peripheral edema.   Gastrointestinal: Positive for abdominal pain and constipation.   Breasts:  Negative for tenderness, breast mass and discharge.   Genitourinary: Negative for pelvic pain, vaginal bleeding and vaginal discharge.   Musculoskeletal: Positive for arthralgias and myalgias.   Neurological: Positive for dizziness.     She does not really have any ongoing abdominal pain, just seems to have a lot of gas, thinks she is lactose intolerant. Bowels are actually working okay.  Edema is mild and stable.  She has a number of joint aches and pains which are overall stable other than the bruising from the fall.    OBJECTIVE:      Physical Exam      Patient alert, in no acute distress  /67 (BP Location: Left arm, Patient Position: Sitting, Cuff Size: Adult Regular)   Pulse 83   Temp 98.5  F (36.9  C) (Oral)   Resp 16   Wt 93.2 kg (205 lb 8 oz)   LMP  (LMP Unknown)   SpO2 96%   BMI 42.58 kg/m      HEENT: extraocular movements are intact, pupils equal and reactive to light and accommodation  NECK: Neck supple. No adenopathy. Thyroid symmetric, normal size,,  Carotids without bruits.  PULMONARY: Crackles approximately half to two thirds way up both sides, no wheezing  CARDIAC: regular rate and rhythm and no murmurs, clicks, or gallops  PULSES: 2/2 throughout  There is 1-2+ edema.  There is some bruising of the left upper arm but no marked tenderness at the humerus, she is able to lift her shoulder adequately.  Some soreness of the left lateral chest but no severe point tenderness.         ASSESSMENT / PLAN:   1. Encounter for annual wellness exam in Medicare patient  Up to date    2. Non-Hodgkin's lymphoma, unspecified body region, unspecified non-Hodgkin lymphoma type (H)  Followed by hematology, overall stable    3. Pulmonary interstitial fibrosis (H)  Continued good oxygen levels, very slow progressive dyspnea on exertion, no change in management    4. Rheumatoid arthritis involving multiple sites, unspecified whether rheumatoid factor present (H)  Continues to follow with rheumatology, overall fairly stable    5. Morbid obesity, unspecified obesity type (H)  Weight is fairly stable, try to watch diet    6. Essential hypertension, benign  Well-controlled, continue medications  - atenolol (TENORMIN) 100 MG tablet; Take 1 tablet (100 mg) by mouth daily  Dispense: 90 tablet; Refill: 3  - losartan (COZAAR) 100 MG tablet; Take 1 tablet (100 mg) by mouth daily  Dispense: 90 tablet; Refill: 3    7. Osteopenia, unspecified location  Stable, continue Boniva  - IBANdronate (BONIVA) 150 MG tablet; Take 1 tablet (150 mg) by mouth every 30 days Take 60 minutes before am meal with 8 oz. water. Remain upright for 60 minutes.  Dispense: 3 tablet; Refill: 3    8. Chest wall pain  Probably does not have any fractures of the ribs of the humerus, she does use tramadol episodically for various pains, will refill medication for the short-term.  - traMADol (ULTRAM) 50 MG tablet; Take 1 tablet (50 mg) by mouth every 8 hours as needed for severe pain  Dispense: 30 tablet; Refill: 0    Patient  "has been advised of split billing requirements and indicates understanding: Yes  COUNSELING:  Reviewed preventive health counseling, as reflected in patient instructions    Estimated body mass index is 41.44 kg/m  as calculated from the following:    Height as of 11/29/19: 1.48 m (4' 10.25\").    Weight as of 11/9/20: 90.7 kg (200 lb).    Weight management plan: Discussed healthy diet and exercise guidelines    She reports that she has never smoked. She has never used smokeless tobacco.      Appropriate preventive services were discussed with this patient, including applicable screening as appropriate for cardiovascular disease, diabetes, osteopenia/osteoporosis, and glaucoma.  As appropriate for age/gender, discussed screening for colorectal cancer, prostate cancer, breast cancer, and cervical cancer. Checklist reviewing preventive services available has been given to the patient.    Reviewed patients plan of care and provided an AVS. The Basic Care Plan (routine screening as documented in Health Maintenance) for Nicolás meets the Care Plan requirement. This Care Plan has been established and reviewed with the Patient.    Counseling Resources:  ATP IV Guidelines  Pooled Cohorts Equation Calculator  Breast Cancer Risk Calculator  Breast Cancer: Medication to Reduce Risk  FRAX Risk Assessment  ICSI Preventive Guidelines  Dietary Guidelines for Americans, 2010  [employer]'s MyPlate  ASA Prophylaxis  Lung CA Screening    Yocasta Hernandez MD  [facility]    Identified Health Risks:  "

## 2020-12-15 ENCOUNTER — APPOINTMENT (OUTPATIENT)
Dept: CT IMAGING | Facility: CLINIC | Age: 80
End: 2020-12-15
Attending: PHYSICIAN ASSISTANT
Payer: COMMERCIAL

## 2020-12-15 ENCOUNTER — TELEPHONE (OUTPATIENT)
Dept: INTERNAL MEDICINE | Facility: CLINIC | Age: 80
End: 2020-12-15

## 2020-12-15 ENCOUNTER — HOSPITAL ENCOUNTER (EMERGENCY)
Facility: CLINIC | Age: 80
Discharge: HOME | End: 2020-12-15
Attending: PHYSICIAN ASSISTANT | Admitting: PHYSICIAN ASSISTANT
Payer: COMMERCIAL

## 2020-12-15 VITALS
SYSTOLIC BLOOD PRESSURE: 146 MMHG | HEART RATE: 77 BPM | TEMPERATURE: 98.4 F | BODY MASS INDEX: 39.27 KG/M2 | HEIGHT: 60 IN | WEIGHT: 200 LBS | OXYGEN SATURATION: 97 % | DIASTOLIC BLOOD PRESSURE: 67 MMHG | RESPIRATION RATE: 16 BRPM

## 2020-12-15 DIAGNOSIS — E87.1 HYPONATREMIA: ICD-10-CM

## 2020-12-15 DIAGNOSIS — M54.50 ACUTE BILATERAL LOW BACK PAIN WITHOUT SCIATICA: ICD-10-CM

## 2020-12-15 DIAGNOSIS — R39.11 URINARY HESITANCY: ICD-10-CM

## 2020-12-15 LAB
ALBUMIN UR-MCNC: 20 MG/DL
ANION GAP SERPL CALCULATED.3IONS-SCNC: 6 MMOL/L (ref 3–14)
APPEARANCE UR: CLEAR
BASOPHILS # BLD AUTO: 0 10E9/L (ref 0–0.2)
BASOPHILS NFR BLD AUTO: 0.4 %
BILIRUB UR QL STRIP: NEGATIVE
BUN SERPL-MCNC: 10 MG/DL (ref 7–30)
CALCIUM SERPL-MCNC: 8.7 MG/DL (ref 8.5–10.1)
CHLORIDE SERPL-SCNC: 85 MMOL/L (ref 94–109)
CO2 SERPL-SCNC: 30 MMOL/L (ref 20–32)
COLOR UR AUTO: ABNORMAL
CREAT SERPL-MCNC: 0.5 MG/DL (ref 0.52–1.04)
DIFFERENTIAL METHOD BLD: ABNORMAL
EOSINOPHIL # BLD AUTO: 0.3 10E9/L (ref 0–0.7)
EOSINOPHIL NFR BLD AUTO: 3 %
ERYTHROCYTE [DISTWIDTH] IN BLOOD BY AUTOMATED COUNT: 13.5 % (ref 10–15)
GFR SERPL CREATININE-BSD FRML MDRD: >90 ML/MIN/{1.73_M2}
GLUCOSE SERPL-MCNC: 108 MG/DL (ref 70–99)
GLUCOSE UR STRIP-MCNC: NEGATIVE MG/DL
HCT VFR BLD AUTO: 40.2 % (ref 35–47)
HGB BLD-MCNC: 13.6 G/DL (ref 11.7–15.7)
HGB UR QL STRIP: NEGATIVE
IMM GRANULOCYTES # BLD: 0.1 10E9/L (ref 0–0.4)
IMM GRANULOCYTES NFR BLD: 0.9 %
KETONES UR STRIP-MCNC: NEGATIVE MG/DL
LEUKOCYTE ESTERASE UR QL STRIP: NEGATIVE
LYMPHOCYTES # BLD AUTO: 0.5 10E9/L (ref 0.8–5.3)
LYMPHOCYTES NFR BLD AUTO: 5.1 %
MCH RBC QN AUTO: 31.3 PG (ref 26.5–33)
MCHC RBC AUTO-ENTMCNC: 33.8 G/DL (ref 31.5–36.5)
MCV RBC AUTO: 92 FL (ref 78–100)
MONOCYTES # BLD AUTO: 0.9 10E9/L (ref 0–1.3)
MONOCYTES NFR BLD AUTO: 9 %
NEUTROPHILS # BLD AUTO: 8.2 10E9/L (ref 1.6–8.3)
NEUTROPHILS NFR BLD AUTO: 81.6 %
NITRATE UR QL: NEGATIVE
NRBC # BLD AUTO: 0 10*3/UL
NRBC BLD AUTO-RTO: 0 /100
PH UR STRIP: 7.5 PH (ref 5–7)
PLATELET # BLD AUTO: 303 10E9/L (ref 150–450)
POTASSIUM SERPL-SCNC: 4.2 MMOL/L (ref 3.4–5.3)
RBC # BLD AUTO: 4.35 10E12/L (ref 3.8–5.2)
RBC #/AREA URNS AUTO: 0 /HPF (ref 0–2)
SODIUM SERPL-SCNC: 121 MMOL/L (ref 133–144)
SOURCE: ABNORMAL
SP GR UR STRIP: 1.01 (ref 1–1.03)
SQUAMOUS #/AREA URNS AUTO: <1 /HPF (ref 0–1)
UROBILINOGEN UR STRIP-MCNC: NORMAL MG/DL (ref 0–2)
WBC # BLD AUTO: 10 10E9/L (ref 4–11)
WBC #/AREA URNS AUTO: 1 /HPF (ref 0–5)

## 2020-12-15 PROCEDURE — 74177 CT ABD & PELVIS W/CONTRAST: CPT

## 2020-12-15 PROCEDURE — 80048 BASIC METABOLIC PNL TOTAL CA: CPT | Performed by: PHYSICIAN ASSISTANT

## 2020-12-15 PROCEDURE — 250N000009 HC RX 250: Performed by: PHYSICIAN ASSISTANT

## 2020-12-15 PROCEDURE — 81001 URINALYSIS AUTO W/SCOPE: CPT | Performed by: PHYSICIAN ASSISTANT

## 2020-12-15 PROCEDURE — 51798 US URINE CAPACITY MEASURE: CPT

## 2020-12-15 PROCEDURE — 99285 EMERGENCY DEPT VISIT HI MDM: CPT | Mod: 25

## 2020-12-15 PROCEDURE — 250N000013 HC RX MED GY IP 250 OP 250 PS 637: Performed by: PHYSICIAN ASSISTANT

## 2020-12-15 PROCEDURE — 85025 COMPLETE CBC W/AUTO DIFF WBC: CPT | Performed by: PHYSICIAN ASSISTANT

## 2020-12-15 PROCEDURE — 250N000011 HC RX IP 250 OP 636: Performed by: PHYSICIAN ASSISTANT

## 2020-12-15 RX ORDER — IOPAMIDOL 755 MG/ML
100 INJECTION, SOLUTION INTRAVASCULAR ONCE
Status: COMPLETED | OUTPATIENT
Start: 2020-12-15 | End: 2020-12-15

## 2020-12-15 RX ORDER — ACETAMINOPHEN 500 MG
1000 TABLET ORAL ONCE
Status: COMPLETED | OUTPATIENT
Start: 2020-12-15 | End: 2020-12-15

## 2020-12-15 RX ADMIN — ACETAMINOPHEN 1000 MG: 500 TABLET, FILM COATED ORAL at 13:29

## 2020-12-15 RX ADMIN — IOPAMIDOL 100 ML: 755 INJECTION, SOLUTION INTRAVENOUS at 14:31

## 2020-12-15 RX ADMIN — SODIUM CHLORIDE 65 ML: 9 INJECTION, SOLUTION INTRAVENOUS at 14:31

## 2020-12-15 ASSESSMENT — ENCOUNTER SYMPTOMS
HEMATURIA: 0
DYSURIA: 0
DIFFICULTY URINATING: 1
FEVER: 0
BACK PAIN: 1
ABDOMINAL PAIN: 1
CHILLS: 0
BLOOD IN STOOL: 0

## 2020-12-15 ASSESSMENT — MIFFLIN-ST. JEOR: SCORE: 1298.69

## 2020-12-15 NOTE — ED PROVIDER NOTES
History   Chief Complaint:  Back Pain and Urinary Retention    HPI   Nicolás Wyman is a 80 year old female with a history of hypertension and rheumatoid arthritis who presents with back pain and urinary retention. The patient reports that she had a fall 2 weeks ago and over the last couple days has developed worsening lower bilateral back pain as well as urinary retention/hesitancy. She also has lower abdominal pain. No blood thinners. No fever, chills, dysuria, hematuria,blood in stool, or trouble walking. She took a dose of her tramadol around 11 am this morning which helped alleviate her back pain for a few hours.     Allergies:  Ace Inhibitors     Medications:    Norvasc  Tenormin  Boniva  Carnitor  Losartan  Tramadol  Valsartan    Past Medical History:    Discoid lupus  Glaucoma  Non hodgkin's lymphoma  Pulmonary interstitial fibrosis  Rheumatoid arthritis  Hypertension  GERD  JIGNESH    Past Surgical History:    Arthroplasty knee  Hysterectomy  Gallbladder removal  Knee replacement    Family History:    Heart disease (Mother)  Hypertension (Mother)  Eye disorder (Mother)    Social History:  Smoking Status: Never Smoker  Smokeless Tobacco: Never Used  Alcohol Use: Negative  Drug Use: Negative  PCP: Yocasta Hernandez   Marital Status:      Review of Systems   Constitutional: Negative for chills and fever.   Gastrointestinal: Positive for abdominal pain. Negative for blood in stool.   Genitourinary: Positive for difficulty urinating. Negative for dysuria and hematuria.   Musculoskeletal: Positive for back pain. Negative for gait problem.   All other systems reviewed and are negative.      Physical Exam     Patient Vitals for the past 24 hrs:   BP Temp Temp src Pulse Resp SpO2 Height Weight   12/15/20 1543 -- -- -- -- 16 -- -- --   12/15/20 1353 -- -- -- -- -- 97 % -- --   12/15/20 1245 (!) 146/67 -- -- 77 -- 99 % -- --   12/15/20 1211 (!) 124/90 98.4  F (36.9  C) Oral 89 24 97 % 1.524 m (5') 90.7 kg (200 lb)      Physical Exam  General:         Resting comfortably, in no acute distress.   Head:              Scalp is atraumatic.  Eyes:               The pupils are equal, round, and reactive to light. Normal conjunctiva.   ENT:                                      Ears:                The external ears are normal.   Nose:               The external nose is normal.  Throat:             The oropharynx is normal. Mucus membranes are moist.                 Neck:              Normal range of motion.   CV:                  Normal rate. No murmur. 2+ radial pulses  Resp:              Breath sounds are clear bilaterally. Non-labored, no retractions or accessory muscle use.  GI:                   Abdomen is soft, no distension, no tenderness. No CVA tenderness to percussion.   MS:                  Normal range of motion throughout upper and lower extremities. No acute deformities. No midline cervical, thoracic, lumbar tenderness.   Skin:               Warm and dry. No rash. Large area of ecchymosis to the left lower leg and left trunk (from fall 2 weeks ago).   Neuro:  Alert. Strength and sensation grossly intact.   Psych:                        Awake. Alert.  Appropriate interactions.     Emergency Department Course   Laboratory:  CBC: WBC 10.0, HGB 13.6,   BMP:  (L), Chloride 85 (L), Glucose 108 (H), Creatinine 0.50 (L) o/w WNL  UA: pH 7.5 (H), Protein albumin 20 (A), o/w Negative     Imaging:  Abd/pelvis CT, IV contrast only TRAUMA/AAA  1.  No urinary tract calculi or hydronephrosis. Left renal cysts. No   further follow-up required.   2.  Bladder is unremarkable without wall thickening or nodularity.   3.  No bowel obstruction, diverticulitis or appendicitis.  Reading per radiology    Emergency Department Course:  Reviewed:  12:18 I reviewed the patient's nursing notes, vitals, past medical records, Care Everywhere.     Assessments:  12:24 I performed an exam of the patient as documented above.     Consults:    1530: I spoke with the patient's daughter regarding her plan of care.    Interventions:  1329: Tylenol 1,000 mg Oral    Disposition:  The patient was discharged to home.     Impression & Plan   Medical Decision Making:  Nicolás Wyman is a 80 year old female presents emergency department with low back pain and urinary retention/hesitancy.  Vitals reassuring besides slight hypertension.  On exam, patient is well-appearing in no acute distress.  She has a normal physical examination without abdominal tenderness.  Differential diagnosis is broad and includes nephrolithiasis, UTI/pyelonephritis, ureter anatomical obstruction, referred pain from abdomen, musculoskeletal back pain, among others.    Blood work notable for hyponatremia of 121 and hypochloremia of 85.  Patient notes she has been eating and drinking appropriately.  Unclear etiology at this time.  She has a follow-up appoint with her primary care provider tomorrow and I recommended following up closely for lab recheck and discuss abnormal lab work further.  Urine without sign of infection or blood to suspect kidney stone.  CT abdomen completed that was overall unremarkable without evidence of nephrolithiasis.  There is no evidence of mass or other acute findings.  At this time, unclear etiology of symptoms.  Patient is able to urinate in the emergency department and no indication to place catheter at this time.  I recommend close follow-up with primary care provider tomorrow and if symptoms persist referral to urology may be indicated.  I discussed plan with patient and daughter who agree.  Understand reasons to return including fevers, increasing pain, inability to urinate, or any other new/concerning symptoms.    Diagnosis:    ICD-10-CM    1. Urinary hesitancy  R39.11    2. Hyponatremia  E87.1    3. Acute bilateral low back pain without sciatica  M54.5        Discharge Medications:  Discharge Medication List as of 12/15/2020  3:28 PM        Jhonny  Disclosure:  I, Nino Osiris, am serving as a scribe at 2:01 PM on 12/15/2020 to document services personally performed by Thea Burr PA-C based on my observations and the provider's statements to me.     [facility] EMERGENCY DEPT       Thea Burr PA-C  12/15/20 2303

## 2020-12-15 NOTE — TELEPHONE ENCOUNTER
Does she think she has been putting out a significant amount of urine over the day?  Is she going for many many hours without urinating at all, does she feel  feel like she is emptying her bladder?   I am concerned if she is not emptying her bladder that it will be overly full and she might need a catheter to empty the bladder.  There can be other causes of urinary retention, it may not be due to a bladder infection.   Is there any burning with urination, cloudiness of the urine, odor to the urine, blood in the urine, as she feeling ill in any way or any other symptoms of bladder infection?

## 2020-12-15 NOTE — TELEPHONE ENCOUNTER
Spoke to pt, she has had lower backache for past 2-3 days and is having a hard time urinating.  Feels like has to go but very little to nothing comes out.  There is no pain with urination and she does not have a fever.  Advised she should be seen or but she doesn't want to go anywhere and said she would have a hard time leaving a urine sample anyway. Pt has MyChart but doesn't know how to use it.     States has been drinking water.  She takes tramadol for pain and this helps her back pain as well. She would like Rx for uti, even though I told her she should have appt.  Please advise.

## 2020-12-15 NOTE — ED AVS SNAPSHOT
[facility] Emergency Dept  [address]  [facility] [address]  Phone: [phone]  Fax: [phone]                                    Nicolás Wyman   MRN: [id number]    Department: [facility] Emergency Dept   Date of Visit: 12/15/2020           After Visit Summary Signature Page    I have received my discharge instructions, and my questions have been answered. I have discussed any challenges I see with this plan with the nurse or doctor.    ..........................................................................................................................................  Patient/Patient Representative Signature      ..........................................................................................................................................  Patient Representative Print Name and Relationship to Patient    ..................................................               ................................................  Date                                   Time    ..........................................................................................................................................  Reviewed by Signature/Title    ...................................................              ..............................................  Date                                               Time          22EPIC Rev 08/18

## 2020-12-15 NOTE — ED NOTES
Pt has extensive bruising to the left arm, leg and torso. Pt states this is from a fall that occurred x2 weeks ago. Bruising appears all the same age and in the same stages of healing.

## 2020-12-15 NOTE — TELEPHONE ENCOUNTER
Patient calling  She cant not release urine when she feels the need. She is not sure what is going on. Please advise. Ok to call and brian [phone]

## 2020-12-15 NOTE — DISCHARGE INSTRUCTIONS
*Follow up with Dr. Hernandez tomorrow.   *Return to the ED if you develop fever/chills, unable to urinate, or any other new/concerning symptoms develop.

## 2020-12-15 NOTE — TELEPHONE ENCOUNTER
"Pt states urinates about every 4 hours and \"is not much\", definitely less than the amount of water she is drinking.  She doesn't feel like is emptying her bladder.  No other uti symptoms, no burning, urine looks normal-no cloudiness, odor, or visible blood.  She said does not feel ill, but just \"not feeling good\".   She is eating, but says nothing tastes very good.  She had small BM today, last one before this was last Friday, so she has been taking laxatives.    "

## 2020-12-15 NOTE — ED TRIAGE NOTES
"Pt has lower back pain for the past week along with difficulty urinating \"I only go a little bit at a time\".  She reports a fall 2 weeks ago along with a cortisone shot in right hip about 1 month ago. Pt took a tramodol prior to coming to ED.  "

## 2020-12-16 ENCOUNTER — ANCILLARY PROCEDURE (OUTPATIENT)
Dept: GENERAL RADIOLOGY | Facility: CLINIC | Age: 80
End: 2020-12-16
Attending: INTERNAL MEDICINE
Payer: COMMERCIAL

## 2020-12-16 ENCOUNTER — OFFICE VISIT (OUTPATIENT)
Dept: INTERNAL MEDICINE | Facility: CLINIC | Age: 80
End: 2020-12-16
Payer: COMMERCIAL

## 2020-12-16 VITALS
HEIGHT: 59 IN | WEIGHT: 200 LBS | RESPIRATION RATE: 16 BRPM | BODY MASS INDEX: 40.32 KG/M2 | TEMPERATURE: 98.8 F | DIASTOLIC BLOOD PRESSURE: 80 MMHG | HEART RATE: 89 BPM | SYSTOLIC BLOOD PRESSURE: 155 MMHG | OXYGEN SATURATION: 98 %

## 2020-12-16 DIAGNOSIS — M54.50 ACUTE MIDLINE LOW BACK PAIN WITHOUT SCIATICA: ICD-10-CM

## 2020-12-16 DIAGNOSIS — M54.50 ACUTE MIDLINE LOW BACK PAIN WITHOUT SCIATICA: Primary | ICD-10-CM

## 2020-12-16 DIAGNOSIS — Z91.81 STATUS POST FALL: ICD-10-CM

## 2020-12-16 DIAGNOSIS — E87.1 HYPONATREMIA: ICD-10-CM

## 2020-12-16 PROCEDURE — 72100 X-RAY EXAM L-S SPINE 2/3 VWS: CPT | Performed by: RADIOLOGY

## 2020-12-16 PROCEDURE — 99214 OFFICE O/P EST MOD 30 MIN: CPT | Performed by: INTERNAL MEDICINE

## 2020-12-16 PROCEDURE — 80048 BASIC METABOLIC PNL TOTAL CA: CPT | Performed by: INTERNAL MEDICINE

## 2020-12-16 PROCEDURE — 36415 COLL VENOUS BLD VENIPUNCTURE: CPT | Performed by: INTERNAL MEDICINE

## 2020-12-16 ASSESSMENT — MIFFLIN-ST. JEOR: SCORE: 1282.82

## 2020-12-16 NOTE — PROGRESS NOTES
"Subjective     Nicolás IRAM Wyman is a 80 year old female who presents to clinic today for the following health issues:    HPI         ED/UC Followup:    Facility:  [facility]  Date of visit: 12/15/2020  Reason for visit: UTI  Current Status: back pain     Here with her daughter Libby, pt in a wheelchair     Patient is seen for a follow up visit.  Seen in ED yesterday for LBP, assessed for UTI.   Has had a fall 2 weeks ago at home, fell on the left side sustaining bruising on the back , flank, chest and left leg.   Since then has had persistent back pain in the lower back mid spine. No radiation. Has difficulty ambulating.   No leg numbness or weakness.   Had episodes of difficulty urinating. In ED no evidence of UTI. Had abdominal CT, no acute findings.   On Tramadol for h/o RA, has used more frequently , because of the back pain.   On chronic prednisone and plaquenil treatment for RA.   Lab work showed hyponatremia - sodium 120. Not on diuretic. Has been drinking more fluids on purpose because of the decreased urine output.   Has mild LE edema. No SOB.     Review of Systems   Constitutional, HEENT, cardiovascular, pulmonary, gi and gu systems are negative, except as otherwise noted.      Objective    BP (!) 155/80   Pulse 89   Temp 98.8  F (37.1  C) (Oral)   Resp 16   Ht 1.499 m (4' 11\")   Wt 90.7 kg (200 lb)   LMP  (LMP Unknown)   SpO2 98%   Breastfeeding No   BMI 40.40 kg/m    Body mass index is 40.4 kg/m .  Physical Exam   GENERAL: frail, elderly, alert and no distress  NECK: no adenopathy, no asymmetry, masses, or scars and thyroid normal to palpation  RESP: lungs clear to auscultation - no rales, rhonchi or wheezes  CV: regular rate and rhythm, normal S1 S2, no S3 or S4, no murmur, click or rub, no peripheral edema and peripheral pulses strong  ABDOMEN: soft,obese, nontender, no hepatosplenomegaly, no masses and bowel sounds normal  MS: no gross musculoskeletal defects noted, 2+ LE " edema  Subcutaneous ecchymosis , hematomas in the LS spine left paravertebral, left flank, left chest and left lower leg   Palpatory tenderness mid LS spine     No results found for this or any previous visit (from the past 24 hour(s)).        Assessment & Plan   Problem List Items Addressed This Visit     None      Visit Diagnoses     Acute midline low back pain without sciatica    -  Primary    Relevant Orders    XR Lumbar Spine 2/3 Views (Completed)    HOME CARE NURSING REFERRAL    Hyponatremia        Relevant Orders    Basic metabolic panel (Completed)    HOME CARE NURSING REFERRAL    Status post fall        Relevant Orders    HOME CARE NURSING REFERRAL                  Assess LS spine X rays - OA, DDD and L3 compression fracture age indeterminate   Assess BMP for hyponatremia, restrict fluids to 1200 ml daily   Refer for home PT and nursing       See Patient Instructions  Return in about 4 weeks (around 1/13/2021) for follow up on acute problem if persists.    Jeison Connelly MD  [facility]

## 2020-12-17 ENCOUNTER — TELEPHONE (OUTPATIENT)
Dept: INTERNAL MEDICINE | Facility: CLINIC | Age: 80
End: 2020-12-17

## 2020-12-17 ENCOUNTER — HOSPITAL ENCOUNTER (INPATIENT)
Facility: CLINIC | Age: 80
LOS: 4 days | Discharge: HOME HEALTH | DRG: 644 | End: 2020-12-21
Attending: EMERGENCY MEDICINE | Admitting: INTERNAL MEDICINE
Payer: COMMERCIAL

## 2020-12-17 DIAGNOSIS — N39.0 URINARY TRACT INFECTION WITHOUT HEMATURIA, SITE UNSPECIFIED: Primary | ICD-10-CM

## 2020-12-17 DIAGNOSIS — E87.1 HYPONATREMIA: ICD-10-CM

## 2020-12-17 DIAGNOSIS — M54.50 BILATERAL LOW BACK PAIN WITHOUT SCIATICA, UNSPECIFIED CHRONICITY: ICD-10-CM

## 2020-12-17 DIAGNOSIS — M62.81 GENERALIZED MUSCLE WEAKNESS: ICD-10-CM

## 2020-12-17 LAB
ALBUMIN SERPL-MCNC: 3.1 G/DL (ref 3.4–5)
ALBUMIN UR-MCNC: 20 MG/DL
ALP SERPL-CCNC: 69 U/L (ref 40–150)
ALT SERPL W P-5'-P-CCNC: 33 U/L (ref 0–50)
ANION GAP SERPL CALCULATED.3IONS-SCNC: 4 MMOL/L (ref 3–14)
ANION GAP SERPL CALCULATED.3IONS-SCNC: 8 MMOL/L (ref 3–14)
APPEARANCE UR: ABNORMAL
AST SERPL W P-5'-P-CCNC: 31 U/L (ref 0–45)
BACTERIA #/AREA URNS HPF: ABNORMAL /HPF
BASOPHILS # BLD AUTO: 0 10E9/L (ref 0–0.2)
BASOPHILS NFR BLD AUTO: 0.3 %
BILIRUB SERPL-MCNC: 0.9 MG/DL (ref 0.2–1.3)
BILIRUB UR QL STRIP: NEGATIVE
BUN SERPL-MCNC: 12 MG/DL (ref 7–30)
BUN SERPL-MCNC: 9 MG/DL (ref 7–30)
CALCIUM SERPL-MCNC: 8.3 MG/DL (ref 8.5–10.1)
CALCIUM SERPL-MCNC: 8.5 MG/DL (ref 8.5–10.1)
CHLORIDE SERPL-SCNC: 82 MMOL/L (ref 94–109)
CHLORIDE SERPL-SCNC: 83 MMOL/L (ref 94–109)
CO2 SERPL-SCNC: 26 MMOL/L (ref 20–32)
CO2 SERPL-SCNC: 30 MMOL/L (ref 20–32)
COLOR UR AUTO: ABNORMAL
CREAT SERPL-MCNC: 0.44 MG/DL (ref 0.52–1.04)
CREAT SERPL-MCNC: 0.47 MG/DL (ref 0.52–1.04)
CREAT UR-MCNC: 38 MG/DL
CREAT UR-MCNC: 38 MG/DL
DIFFERENTIAL METHOD BLD: ABNORMAL
EOSINOPHIL # BLD AUTO: 0.4 10E9/L (ref 0–0.7)
EOSINOPHIL NFR BLD AUTO: 3.9 %
ERYTHROCYTE [DISTWIDTH] IN BLOOD BY AUTOMATED COUNT: 13.3 % (ref 10–15)
FLUAV+FLUBV RNA SPEC QL NAA+PROBE: NEGATIVE
FLUAV+FLUBV RNA SPEC QL NAA+PROBE: NEGATIVE
FRACT EXCRET NA UR+SERPL-RTO: 0.4 %
GFR SERPL CREATININE-BSD FRML MDRD: >90 ML/MIN/{1.73_M2}
GFR SERPL CREATININE-BSD FRML MDRD: >90 ML/MIN/{1.73_M2}
GLUCOSE SERPL-MCNC: 104 MG/DL (ref 70–99)
GLUCOSE SERPL-MCNC: 88 MG/DL (ref 70–99)
GLUCOSE UR STRIP-MCNC: NEGATIVE MG/DL
HCT VFR BLD AUTO: 36.7 % (ref 35–47)
HGB BLD-MCNC: 12.4 G/DL (ref 11.7–15.7)
HGB UR QL STRIP: NEGATIVE
IMM GRANULOCYTES # BLD: 0.1 10E9/L (ref 0–0.4)
IMM GRANULOCYTES NFR BLD: 1.2 %
KETONES UR STRIP-MCNC: NEGATIVE MG/DL
LABORATORY COMMENT REPORT: NORMAL
LEUKOCYTE ESTERASE UR QL STRIP: ABNORMAL
LYMPHOCYTES # BLD AUTO: 0.4 10E9/L (ref 0.8–5.3)
LYMPHOCYTES NFR BLD AUTO: 4.4 %
MAGNESIUM SERPL-MCNC: 1.9 MG/DL (ref 1.6–2.3)
MAGNESIUM SERPL-MCNC: 2.1 MG/DL (ref 1.6–2.3)
MCH RBC QN AUTO: 30.7 PG (ref 26.5–33)
MCHC RBC AUTO-ENTMCNC: 33.8 G/DL (ref 31.5–36.5)
MCV RBC AUTO: 91 FL (ref 78–100)
MONOCYTES # BLD AUTO: 1.1 10E9/L (ref 0–1.3)
MONOCYTES NFR BLD AUTO: 11.3 %
MUCOUS THREADS #/AREA URNS LPF: PRESENT /LPF
NEUTROPHILS # BLD AUTO: 7.4 10E9/L (ref 1.6–8.3)
NEUTROPHILS NFR BLD AUTO: 78.9 %
NITRATE UR QL: NEGATIVE
NRBC # BLD AUTO: 0 10*3/UL
NRBC BLD AUTO-RTO: 0 /100
PH UR STRIP: 6.5 PH (ref 5–7)
PHOSPHATE SERPL-MCNC: 2.8 MG/DL (ref 2.5–4.5)
PLATELET # BLD AUTO: 302 10E9/L (ref 150–450)
POTASSIUM SERPL-SCNC: 3.9 MMOL/L (ref 3.4–5.3)
POTASSIUM SERPL-SCNC: 4.4 MMOL/L (ref 3.4–5.3)
POTASSIUM SERPL-SCNC: 4.5 MMOL/L (ref 3.4–5.3)
PROT SERPL-MCNC: 6.5 G/DL (ref 6.8–8.8)
RBC # BLD AUTO: 4.04 10E12/L (ref 3.8–5.2)
RBC #/AREA URNS AUTO: 3 /HPF (ref 0–2)
RSV RNA SPEC QL NAA+PROBE: NEGATIVE
SARS-COV-2 RNA SPEC QL NAA+PROBE: NEGATIVE
SODIUM SERPL-SCNC: 116 MMOL/L (ref 133–144)
SODIUM SERPL-SCNC: 117 MMOL/L (ref 133–144)
SODIUM SERPL-SCNC: 118 MMOL/L (ref 133–144)
SODIUM UR-SCNC: 41 MMOL/L
SOURCE: ABNORMAL
SP GR UR STRIP: 1.01 (ref 1–1.03)
SPECIMEN SOURCE: NORMAL
SQUAMOUS #/AREA URNS AUTO: 10 /HPF (ref 0–1)
UROBILINOGEN UR STRIP-MCNC: NORMAL MG/DL (ref 0–2)
WBC # BLD AUTO: 9.3 10E9/L (ref 4–11)
WBC #/AREA URNS AUTO: 24 /HPF (ref 0–5)

## 2020-12-17 PROCEDURE — 99285 EMERGENCY DEPT VISIT HI MDM: CPT | Mod: 25

## 2020-12-17 PROCEDURE — 87088 URINE BACTERIA CULTURE: CPT | Performed by: EMERGENCY MEDICINE

## 2020-12-17 PROCEDURE — 87186 SC STD MICRODIL/AGAR DIL: CPT | Performed by: EMERGENCY MEDICINE

## 2020-12-17 PROCEDURE — 87086 URINE CULTURE/COLONY COUNT: CPT | Performed by: EMERGENCY MEDICINE

## 2020-12-17 PROCEDURE — 81001 URINALYSIS AUTO W/SCOPE: CPT | Performed by: EMERGENCY MEDICINE

## 2020-12-17 PROCEDURE — 84100 ASSAY OF PHOSPHORUS: CPT | Performed by: EMERGENCY MEDICINE

## 2020-12-17 PROCEDURE — 250N000013 HC RX MED GY IP 250 OP 250 PS 637: Performed by: EMERGENCY MEDICINE

## 2020-12-17 PROCEDURE — 87636 SARSCOV2 & INF A&B AMP PRB: CPT | Performed by: EMERGENCY MEDICINE

## 2020-12-17 PROCEDURE — 82570 ASSAY OF URINE CREATININE: CPT | Performed by: EMERGENCY MEDICINE

## 2020-12-17 PROCEDURE — 250N000009 HC RX 250: Performed by: INTERNAL MEDICINE

## 2020-12-17 PROCEDURE — 96360 HYDRATION IV INFUSION INIT: CPT

## 2020-12-17 PROCEDURE — 83735 ASSAY OF MAGNESIUM: CPT | Performed by: INTERNAL MEDICINE

## 2020-12-17 PROCEDURE — C9803 HOPD COVID-19 SPEC COLLECT: HCPCS

## 2020-12-17 PROCEDURE — 258N000003 HC RX IP 258 OP 636: Performed by: INTERNAL MEDICINE

## 2020-12-17 PROCEDURE — 258N000003 HC RX IP 258 OP 636: Performed by: EMERGENCY MEDICINE

## 2020-12-17 PROCEDURE — 84132 ASSAY OF SERUM POTASSIUM: CPT | Performed by: INTERNAL MEDICINE

## 2020-12-17 PROCEDURE — 250N000011 HC RX IP 250 OP 636: Performed by: INTERNAL MEDICINE

## 2020-12-17 PROCEDURE — 83735 ASSAY OF MAGNESIUM: CPT | Performed by: EMERGENCY MEDICINE

## 2020-12-17 PROCEDURE — 99223 1ST HOSP IP/OBS HIGH 75: CPT | Mod: AI | Performed by: INTERNAL MEDICINE

## 2020-12-17 PROCEDURE — 36415 COLL VENOUS BLD VENIPUNCTURE: CPT | Performed by: INTERNAL MEDICINE

## 2020-12-17 PROCEDURE — 84295 ASSAY OF SERUM SODIUM: CPT | Performed by: INTERNAL MEDICINE

## 2020-12-17 PROCEDURE — 120N000001 HC R&B MED SURG/OB

## 2020-12-17 PROCEDURE — 85025 COMPLETE CBC W/AUTO DIFF WBC: CPT | Performed by: EMERGENCY MEDICINE

## 2020-12-17 PROCEDURE — 250N000013 HC RX MED GY IP 250 OP 250 PS 637: Performed by: INTERNAL MEDICINE

## 2020-12-17 PROCEDURE — 84300 ASSAY OF URINE SODIUM: CPT | Performed by: EMERGENCY MEDICINE

## 2020-12-17 PROCEDURE — 80053 COMPREHEN METABOLIC PANEL: CPT | Performed by: EMERGENCY MEDICINE

## 2020-12-17 RX ORDER — POLYETHYLENE GLYCOL 3350 17 G/17G
17 POWDER, FOR SOLUTION ORAL 2 TIMES DAILY
Status: DISCONTINUED | OUTPATIENT
Start: 2020-12-17 | End: 2020-12-21 | Stop reason: HOSPADM

## 2020-12-17 RX ORDER — ATENOLOL 25 MG/1
100 TABLET ORAL DAILY
Status: DISCONTINUED | OUTPATIENT
Start: 2020-12-18 | End: 2020-12-21 | Stop reason: HOSPADM

## 2020-12-17 RX ORDER — SODIUM CHLORIDE 9 MG/ML
INJECTION, SOLUTION INTRAVENOUS CONTINUOUS
Status: DISCONTINUED | OUTPATIENT
Start: 2020-12-17 | End: 2020-12-18

## 2020-12-17 RX ORDER — CARBOXYMETHYLCELLULOSE SODIUM 5 MG/ML
1 SOLUTION/ DROPS OPHTHALMIC 3 TIMES DAILY
Status: DISCONTINUED | OUTPATIENT
Start: 2020-12-17 | End: 2020-12-21 | Stop reason: HOSPADM

## 2020-12-17 RX ORDER — CEFTRIAXONE 1 G/1
1 INJECTION, POWDER, FOR SOLUTION INTRAMUSCULAR; INTRAVENOUS EVERY 24 HOURS
Status: DISCONTINUED | OUTPATIENT
Start: 2020-12-17 | End: 2020-12-21 | Stop reason: HOSPADM

## 2020-12-17 RX ORDER — ALBUTEROL SULFATE 0.83 MG/ML
2.5 SOLUTION RESPIRATORY (INHALATION) EVERY 6 HOURS PRN
Status: DISCONTINUED | OUTPATIENT
Start: 2020-12-17 | End: 2020-12-21 | Stop reason: HOSPADM

## 2020-12-17 RX ORDER — NALOXONE HYDROCHLORIDE 0.4 MG/ML
0.2 INJECTION, SOLUTION INTRAMUSCULAR; INTRAVENOUS; SUBCUTANEOUS
Status: DISCONTINUED | OUTPATIENT
Start: 2020-12-17 | End: 2020-12-21 | Stop reason: HOSPADM

## 2020-12-17 RX ORDER — ONDANSETRON 2 MG/ML
4 INJECTION INTRAMUSCULAR; INTRAVENOUS EVERY 6 HOURS PRN
Status: DISCONTINUED | OUTPATIENT
Start: 2020-12-17 | End: 2020-12-21 | Stop reason: HOSPADM

## 2020-12-17 RX ORDER — OFLOXACIN 3 MG/ML
2 SOLUTION/ DROPS OPHTHALMIC 4 TIMES DAILY
Status: DISCONTINUED | OUTPATIENT
Start: 2020-12-17 | End: 2020-12-21 | Stop reason: HOSPADM

## 2020-12-17 RX ORDER — HYDROCODONE BITARTRATE AND ACETAMINOPHEN 5; 325 MG/1; MG/1
1 TABLET ORAL EVERY 4 HOURS PRN
Status: DISCONTINUED | OUTPATIENT
Start: 2020-12-17 | End: 2020-12-19

## 2020-12-17 RX ORDER — TRAMADOL HYDROCHLORIDE 50 MG/1
50 TABLET ORAL 3 TIMES DAILY
Status: ON HOLD | COMMUNITY
End: 2020-12-29

## 2020-12-17 RX ORDER — LOSARTAN POTASSIUM 100 MG/1
100 TABLET ORAL DAILY
Status: DISCONTINUED | OUTPATIENT
Start: 2020-12-18 | End: 2020-12-21 | Stop reason: HOSPADM

## 2020-12-17 RX ORDER — AMOXICILLIN 250 MG
1 CAPSULE ORAL 2 TIMES DAILY PRN
Status: DISCONTINUED | OUTPATIENT
Start: 2020-12-17 | End: 2020-12-21 | Stop reason: HOSPADM

## 2020-12-17 RX ORDER — BISACODYL 10 MG
10 SUPPOSITORY, RECTAL RECTAL DAILY PRN
Status: DISCONTINUED | OUTPATIENT
Start: 2020-12-17 | End: 2020-12-18

## 2020-12-17 RX ORDER — NALOXONE HYDROCHLORIDE 0.4 MG/ML
0.4 INJECTION, SOLUTION INTRAMUSCULAR; INTRAVENOUS; SUBCUTANEOUS
Status: DISCONTINUED | OUTPATIENT
Start: 2020-12-17 | End: 2020-12-21 | Stop reason: HOSPADM

## 2020-12-17 RX ORDER — ACETAMINOPHEN 325 MG/1
650 TABLET ORAL EVERY 4 HOURS PRN
Status: DISCONTINUED | OUTPATIENT
Start: 2020-12-17 | End: 2020-12-21 | Stop reason: HOSPADM

## 2020-12-17 RX ORDER — ONDANSETRON 4 MG/1
4 TABLET, ORALLY DISINTEGRATING ORAL EVERY 6 HOURS PRN
Status: DISCONTINUED | OUTPATIENT
Start: 2020-12-17 | End: 2020-12-21 | Stop reason: HOSPADM

## 2020-12-17 RX ORDER — SODIUM CHLORIDE 9 MG/ML
INJECTION, SOLUTION INTRAVENOUS CONTINUOUS
Status: DISCONTINUED | OUTPATIENT
Start: 2020-12-17 | End: 2020-12-17

## 2020-12-17 RX ORDER — HYDROXYCHLOROQUINE SULFATE 200 MG/1
200 TABLET, FILM COATED ORAL 2 TIMES DAILY
Status: DISCONTINUED | OUTPATIENT
Start: 2020-12-17 | End: 2020-12-21 | Stop reason: HOSPADM

## 2020-12-17 RX ORDER — PREDNISONE 5 MG/1
5 TABLET ORAL DAILY
Status: DISCONTINUED | OUTPATIENT
Start: 2020-12-18 | End: 2020-12-21 | Stop reason: HOSPADM

## 2020-12-17 RX ORDER — ASPIRIN 81 MG/1
81 TABLET ORAL DAILY
Status: DISCONTINUED | OUTPATIENT
Start: 2020-12-18 | End: 2020-12-21 | Stop reason: HOSPADM

## 2020-12-17 RX ORDER — LIDOCAINE 40 MG/G
CREAM TOPICAL
Status: DISCONTINUED | OUTPATIENT
Start: 2020-12-17 | End: 2020-12-21 | Stop reason: HOSPADM

## 2020-12-17 RX ORDER — AMOXICILLIN 250 MG
2 CAPSULE ORAL 2 TIMES DAILY PRN
Status: DISCONTINUED | OUTPATIENT
Start: 2020-12-17 | End: 2020-12-21 | Stop reason: HOSPADM

## 2020-12-17 RX ORDER — HYDROCODONE BITARTRATE AND ACETAMINOPHEN 5; 325 MG/1; MG/1
2 TABLET ORAL ONCE
Status: COMPLETED | OUTPATIENT
Start: 2020-12-17 | End: 2020-12-17

## 2020-12-17 RX ORDER — DOCUSATE SODIUM 100 MG/1
100 CAPSULE, LIQUID FILLED ORAL 2 TIMES DAILY
Status: DISCONTINUED | OUTPATIENT
Start: 2020-12-17 | End: 2020-12-18

## 2020-12-17 RX ADMIN — HYDROCODONE BITARTRATE AND ACETAMINOPHEN 2 TABLET: 5; 325 TABLET ORAL at 16:20

## 2020-12-17 RX ADMIN — CEFTRIAXONE 1 G: 1 INJECTION, POWDER, FOR SOLUTION INTRAMUSCULAR; INTRAVENOUS at 20:28

## 2020-12-17 RX ADMIN — DOCUSATE SODIUM 100 MG: 100 CAPSULE, LIQUID FILLED ORAL at 21:37

## 2020-12-17 RX ADMIN — HYDROXYCHLOROQUINE SULFATE 200 MG: 200 TABLET, FILM COATED ORAL at 21:37

## 2020-12-17 RX ADMIN — CARBOXYMETHYLCELLULOSE SODIUM 1 DROP: 5 SOLUTION/ DROPS OPHTHALMIC at 21:39

## 2020-12-17 RX ADMIN — ALBUTEROL SULFATE 2.5 MG: 2.5 SOLUTION RESPIRATORY (INHALATION) at 21:41

## 2020-12-17 RX ADMIN — POLYETHYLENE GLYCOL 3350 17 G: 17 POWDER, FOR SOLUTION ORAL at 21:38

## 2020-12-17 RX ADMIN — HYDROCODONE BITARTRATE AND ACETAMINOPHEN 1 TABLET: 5; 325 TABLET ORAL at 21:37

## 2020-12-17 RX ADMIN — SODIUM CHLORIDE: 9 INJECTION, SOLUTION INTRAVENOUS at 20:26

## 2020-12-17 RX ADMIN — SODIUM CHLORIDE 500 ML: 9 INJECTION, SOLUTION INTRAVENOUS at 15:03

## 2020-12-17 ASSESSMENT — MIFFLIN-ST. JEOR: SCORE: 1313.67

## 2020-12-17 ASSESSMENT — ENCOUNTER SYMPTOMS
BACK PAIN: 1
COUGH: 0
CONSTIPATION: 1
ABDOMINAL PAIN: 0
DIZZINESS: 0
LIGHT-HEADEDNESS: 0

## 2020-12-17 ASSESSMENT — ACTIVITIES OF DAILY LIVING (ADL): ADLS_ACUITY_SCORE: 18

## 2020-12-17 NOTE — H&P
[facility]  Hospitalist Admission Note  Name: Nicolás Wyman    MRN: [id number]  YOB: 1940    Age: 80 year old  Date of admission: 12/17/2020  Primary care provider: Yocasta Hernandez    Chief Complaint: Severe hyponatremia    Nicolás Wyman is a 80 year old female with PMH including hypertension, pulmonary fibrosis, non-Hodgkin's lymphoma, RA with prior history of long-term steroid use and osteoporosis who presents with low back pain in the setting of a recently diagnosed compression fracture found to have worsening hyponatremia for which she was referred to the emergency room from clinic.    I did talk to her daughter on the phone and she indicates the Nicolás has hardly eaten anything over the past week and has had a very poor appetite.  She has been extremely weak as well.  Also, she has not had a bowel movement in about 7 days.    Here her sodium is 117, slightly up from 115 yesterday.  She is being admitted for careful correction of her sodium.  She recently has been complaining of some urinary hesitancy but no retention has been yet identified however.      Update: Urine analysis is somewhat abnormal.  Will cover with IV ceftriaxone for the time being pending culture results.    Assessment and Plan:   1. Severe hyponatremia: Sodium is 117.  I suspect this is subacute to chronic at this point so the goal will be gradual correction.  --Await urine sodium, FeNa  --Given 500 cc normal saline in the ER, will reduce maintenance to 50 mL/h normal saline for now  --Stat recheck sodium  --Goal correction is approximately 6 mEq/day though in the immediate short-term I would tolerate a jump up to 120 or greater to minimize seizure risk.  --Treat for UTI as above, might be causing some of her poor appetite and generalized weakness.    2.   History of hypertension: Awaiting med rec.  Anticipate resuming home regimen.    3.   Recent issues with urinary retention: Was seen on 12/15 in the ER for this  issue.  CT scan did not show any evidence of hydronephrosis and she was able to void and was discharged home.  Suspect this may be related to narcotic use for back.  --Monitor with bladder scans, as needed straight cath.    4.   History of rheumatoid arthritis: Plaquenil and prednisone 5 mg.  Will resume both.    5.   History of pulmonary fibrosis: Not currently hypoxic.  No apparent acute issues.    6.  Low back pain due to L3 compression fracture: Diagnosed about 2 weeks ago.  Continue Tylenol, bowel regimen and as needed Norco.    7.  Constipation: No bowel movement in about 7 days.  Suspect related to narcotic pain medications used to treat her compression fracture.  --Schedule Colace and MiraLAX twice daily  --As needed senna  --Monitor for response, might need to consider an enema in the morning.    8.  Question of conjunctivitis: She has some fairly thick appearing discharge from both eyes.  Could be bacterial conjunctivitis.  Getting IV ceftriaxone for possible UTI as above.  Will provide some ofloxacin drops for now.  Patient denies any symptoms and was not complaining of this issue.    DVT Prophylaxis: Pneumatic Compression Devices  Code Status: Full Code  Discharge Dispo: Admit under inpatient status      History of Present Illness:  Nicolás Wyman is a 80 year old female with PMH including hypertension, pulmonary fibrosis, non-Hodgkin's lymphoma, RA with prior history of long-term steroid use and osteoporosis who presents with low back pain in the setting of a recently diagnosed compression fracture found to have worsening hyponatremia for which she was referred to the emergency room from clinic.  She had a fall about 2 weeks ago which was evaluated and managed as an outpatient with lumbar spine x-ray showing an age-indeterminate compression fracture of the superior endplate of L3.  She has been taking some tramadol for this and has had some constipation.  Lab draw yesterday from clinic revealed a  sodium of 116.  She was referred to the ER today once that result was available.  Here in the emergency room sodium is 117.  She has not had seizures and mental status has been at baseline.      I did talk to her daughter on the phone to obtain some additional information and she indicates the Nicolás has hardly eaten anything over the past week and has had a very poor appetite.  She has been extremely weak as well.  Also, she has not had a bowel movement in about 7 days.    She is not on any sort of thiazide diuretic.  She was actually seen in the emergency room on 12/15 at which point her sodium was 121.  He was complaining of some back pain and urinary retention at that point.  CT abdomen and pelvis with IV contrast showed no hydronephrosis and the bladder was unremarkable.  She was able to void in the ER so no catheter was placed.     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    removal right TKA 6/12     Non Hodgkin's lymphoma (H) 6/14    chemo x4, clearing by PET     Pulmonary interstitial fibrosis (H)      Rheumatoid arthritis(714.0)     [facility]     Steroid long-term use      Past Surgical History:  Past Surgical History:   Procedure Laterality Date     ARTHROPLASTY KNEE  11/7/2011    Procedure:ARTHROPLASTY KNEE; Right Total Knee Arthroplasty  [facility]; Surgeon:DIVINA BAUTISTA; Location:[facility] OR     ARTHROPLASTY REVISION KNEE  12/29/2011    Procedure:ARTHROPLASTY REVISION KNEE; Irrigation and debridement, right total knee arthroplasty with polyethylene exchange.  ; Surgeon:DIVINA BAUTISTA; Location:[facility] OR     C VAGINAL HYSTERECTOMY  1981    has ovaries      EXCHANGE POLY COMPONENT  ARTHROPLASTY KNEE  11/26/2011    Procedure:EXCHANGE POLY COMPONENT ARTHROPLASTY KNEE; EXCHANGE POLY COMPONENT ARTHROPLASTY KNEE RIGHT, irrigation and debridement right knee; Surgeon:FELIPE TAYLOR; Location:[facility] OR     HC CORRECT BUNION,SIMPLE  2002     HC REMOVAL GALLBLADDER  1981     IRRIGATION AND DEBRIDEMENT KNEE, COMBINED  11/26/2011    Procedure:COMBINED IRRIGATION AND DEBRIDEMENT KNEE; Surgeon:FELIPE TAYLOR; Location:[facility] OR     IRRIGATION AND DEBRIDEMENT KNEE, COMBINED  12/29/2011    Procedure:COMBINED IRRIGATION AND DEBRIDEMENT KNEE; Surgeon:DIVINA BAUTISTA; Location:[facility] OR     IRRIGATION AND DEBRIDEMENT KNEE, COMBINED  2/9/2012    Procedure:COMBINED IRRIGATION AND DEBRIDEMENT KNEE; Wound Debridement Right Knee with Placement of Wound Vac; Surgeon:DIVINA BAUTISTA; Location:[facility] OR     ZZC NONSPECIFIC PROCEDURE  6/22/12    removal of right TKA     Social History:  Social History     Tobacco Use     Smoking status: Never Smoker     Smokeless tobacco: Never Used   Substance Use Topics     Alcohol use: No     Social History     Social History Narrative     Not on file     Family History:  Family History   Problem Relation Age of Onset     Heart Disease Mother      Hypertension Mother      Eye Disorder Mother         macular degen     Family History Negative Father      Allergies:  Allergies   Allergen Reactions     Ace Inhibitors Cough     Latex GI Disturbance     lip swelling     Liquid Adhesive Rash     Tape [Adhesive Tape] Rash     Medications:  No current facility-administered medications on file prior to encounter.        acetaminophen (TYLENOL) 500 MG tablet, Take 500-1,000 mg by mouth every 4 hours as needed for mild pain       acetylcysteine (N-ACETYL CYSTEINE) 600 MG CAPS capsule, Take 1 capsule (600 mg) by mouth daily       Acidophilus Lactobacillus CAPS, Take 1 capsule by mouth daily       albuterol (2.5 MG/3ML) 0.083% nebulizer solution, Take 1 vial (2.5 mg) by nebulization every 6  hours as needed       albuterol (PROAIR HFA, PROVENTIL HFA, VENTOLIN HFA) 108 (90 BASE) MCG/ACT inhaler, Inhale 2 puffs into the lungs every 6 hours as needed for shortness of breath / dyspnea       amLODIPine (NORVASC) 5 MG tablet, TAKE 1 & 1/2 (ONE & ONE-HALF) TABLETS BY MOUTH ONCE DAILY       aspirin 81 MG tablet, Take 1 tablet (81 mg) by mouth daily       atenolol (TENORMIN) 100 MG tablet, Take 1 tablet (100 mg) by mouth daily       Calcium Carb-Cholecalciferol (CALCIUM + D3) 600-200 MG-UNIT TABS,        cholecalciferol (VITAMIN D3) 1000 UNIT tablet, Take 1 tablet (1,000 Units) by mouth daily       doxycycline (VIBRAMYCIN) 100 MG capsule, Take 1 capsule by mouth 2 times daily.       ferrous sulfate (IRON) 325 (65 Fe) MG tablet, Take 1 tablet (325 mg) by mouth daily (with breakfast)       Glucosamine 500 MG CAPS, Take 1 capful by mouth daily.       hydroxychloroquine (PLAQUENIL) 200 MG tablet, Take 1 tablet by mouth 2 times daily.       IBANdronate (BONIVA) 150 MG tablet, Take 1 tablet (150 mg) by mouth every 30 days Take 60 minutes before am meal with 8 oz. water. Remain upright for 60 minutes.       Ibuprofen (ADVIL PO),        levOCARNitine (CARNITOR) 330 MG tablet, 3 tabs bid       losartan (COZAAR) 100 MG tablet, Take 1 tablet (100 mg) by mouth daily       PREDNISONE PO, Take 5 mg by mouth daily       traMADol (ULTRAM) 50 MG tablet, Take 1 tablet (50 mg) by mouth every 8 hours as needed for severe pain       traMADol (ULTRAM) 50 MG tablet, Take 100 mg by mouth          Review of Systems:  A Comprehensive greater than 10 system review of systems was carried out.  Pertinent positives and negatives are noted above.  Otherwise negative for contributory information.     Physical Exam:  Blood pressure 139/86, pulse 80, temperature 98.4  F (36.9  C), temperature source Temporal, resp. rate 16, SpO2 94 %, not currently breastfeeding.  Wt Readings from Last 1 Encounters:   12/16/20 90.7 kg (200 lb)      Exam:  General: Alert, awake, no acute distress.    HEENT: NC/AT, eyes anicteric, she does have some creamy thick yellow to tan discharge from the corners of both eyes.  She does have some conjunctival injection bilaterally as well.  Cardiac: RRR, S1, S2.  No murmurs appreciated.  Pulmonary: Normal chest rise, normal work of breathing.  Lungs CTA BL  Abdomen: soft, non-tender, non-distended.  Bowel Sounds Present.  No guarding.  Extremities: No edema.  No deformities.  Warm, well perfused.  Skin: Has some chronic appearing venous stasis changes in bilateral lower extremities.  No rashes or lesions noted.  Warm and Dry.  Neuro: No focal deficits noted.  Speech clear.  Seems slightly forgetful.  Psych: Appropriate affect.    Data:  EKG: None  Imaging:  Results for orders placed or performed in visit on 12/16/20   XR Lumbar Spine 2/3 Views    Narrative    LUMBAR SPINE TWO-THREE  VIEWS  12/16/2020 2:45 PM     HISTORY: Acute midline low back pain without sciatica    COMPARISON: Abdominal CT dated 12/15/2020    FINDINGS: There is a compression fracture of the superior endplate of  L3. It is difficult to determine the age of this fracture. This  fracture was present on the abdominal CT scan of 12/15/2020 and has  not changed..  There is moderate, 40% loss of mid vertebral body  height. There are degenerative changes present with loss of disc space  height at L1-L2, L2-L3, L4-L5, and L5-S1. Degenerative change in the  facet joints.      Impression    IMPRESSION:   1. Age-indeterminate compression fracture of the superior endplate of  L3  2. Multilevel degenerative changes.    MIROSLAVA ARCE MD     Labs:  Recent Labs   Lab 12/17/20  1456 12/15/20  1232   WBC 9.3 10.0   HGB 12.4 13.6   HCT 36.7 40.2   MCV 91 92    303          Lab Results   Component Value Date     12/17/2020     12/16/2020     12/15/2020    Lab Results   Component Value Date    CHLORIDE 83 12/17/2020    CHLORIDE 82 12/16/2020     CHLORIDE 85 12/15/2020    Lab Results   Component Value Date    BUN 12 12/17/2020    BUN 9 12/16/2020    BUN 10 12/15/2020      Lab Results   Component Value Date    POTASSIUM 4.4 12/17/2020    POTASSIUM 4.5 12/16/2020    POTASSIUM 4.2 12/15/2020    Lab Results   Component Value Date    CO2 30 12/17/2020    CO2 26 12/16/2020    CO2 30 12/15/2020    Lab Results   Component Value Date    CR 0.47 12/17/2020    CR 0.44 12/16/2020    CR 0.50 12/15/2020            Ad Acharya MD  Hospitalist  [facility]

## 2020-12-17 NOTE — PHARMACY-ADMISSION MEDICATION HISTORY
Admission medication history interview status for this patient is complete. See [facility] admission navigator for allergy information, prior to admission medications and immunization status.     Medication history interview done via telephone during Covid-19 pandemic, indicate source(s): Patient  Medication history resources (including written lists, pill bottles, clinic record): care everywhere  Pharmacy: [facility]    Changes made to PTA medication list:  Added: nothing  Deleted: tylenol, lactobacillus, calcium + vit D, ibuprofen, tramadol q8h prn dose  Changed: tramadol to 50 mg TID    Actions taken by pharmacist (provider contacted, etc):None     Additional medication history information: Patient reported missing her last dose of ibandronate last month, but says she has tablets at home.     Medication reconciliation/reorder completed by provider prior to medication history?  Y   (Y/N)     Prior to Admission medications    Medication Sig Last Dose Taking? Auth Provider   acetylcysteine (N-ACETYL CYSTEINE) 600 MG CAPS capsule Take 1 capsule (600 mg) by mouth daily 12/17/2020 at am Yes Yocasta Hernandez MD   albuterol (2.5 MG/3ML) 0.083% nebulizer solution Take 1 vial (2.5 mg) by nebulization every 6 hours as needed 12/17/2020 at am Yes Yocasta Hernandez MD   albuterol (PROAIR HFA, PROVENTIL HFA, VENTOLIN HFA) 108 (90 BASE) MCG/ACT inhaler Inhale 2 puffs into the lungs every 6 hours as needed for shortness of breath / dyspnea 12/17/2020 at am Yes Yocasta Hernandez MD   amLODIPine (NORVASC) 5 MG tablet TAKE 1 & 1/2 (ONE & ONE-HALF) TABLETS BY MOUTH ONCE DAILY 12/17/2020 at am time Yes Yocasta Hernandez MD   aspirin 81 MG tablet Take 1 tablet (81 mg) by mouth daily 12/17/2020 at am Yes Geoffrey Menendez MD   atenolol (TENORMIN) 100 MG tablet Take 1 tablet (100 mg) by mouth daily 12/17/2020 at am Yes Yocasta Hernandez MD   cholecalciferol (VITAMIN D3) 1000 UNIT tablet Take 1 tablet (1,000 Units) by mouth daily 12/17/2020 at am  Yes Geoffrey Menendez MD   doxycycline (VIBRAMYCIN) 100 MG capsule Take 1 capsule by mouth 2 times daily. 12/17/2020 at 1x Yes Yocasta Hernandez MD   ferrous sulfate (IRON) 325 (65 Fe) MG tablet Take 1 tablet (325 mg) by mouth daily (with breakfast) 12/16/2020 at pm Yes Geoffrey Menendez MD   Glucosamine 500 MG CAPS Take 1 capful by mouth daily. 12/17/2020 at am Yes Unknown, Entered By History   hydroxychloroquine (PLAQUENIL) 200 MG tablet Take 1 tablet by mouth 2 times daily. 12/17/2020 at 1x Yes Yocasta Hernandez MD   losartan (COZAAR) 100 MG tablet Take 1 tablet (100 mg) by mouth daily 12/17/2020 at am Yes Yocasta Hernandez MD   predniSONE (DELTASONE) 5 MG tablet Take 5 mg by mouth daily  12/17/2020 at am Yes Reported, Patient   traMADol (ULTRAM) 50 MG tablet Take 50 mg by mouth 3 times daily 12/17/2020 at am Yes Unknown, Entered By History   IBANdronate (BONIVA) 150 MG tablet Take 1 tablet (150 mg) by mouth every 30 days Take 60 minutes before am meal with 8 oz. water. Remain upright for 60 minutes. More than a month at Unknown time  Yocasta Hernandez MD   levOCARNitine (CARNITOR) 330 MG tablet Take 990 mg by mouth 2 times daily    Yocasta Hernandez MD

## 2020-12-17 NOTE — TELEPHONE ENCOUNTER
Left messages on 2 of the phones numbers. Other phone did not have VM, no answer.     Please try again later.

## 2020-12-17 NOTE — ED TRIAGE NOTES
Lower back pain, unknown injury. Patient has had a previous fracture that was found. CMS intact. Has been here for same symptoms this week.

## 2020-12-17 NOTE — LETTER
Transition Communication Hand-off for Care Transitions to Next Level of Care Provider    Name: Nicolás Wyman  : 1940  MRN #: [id number]  Primary Care Provider: Yocasta Hernandez     Sesay Recommendations:  CM met with patient at bedside. She has URR (unplanned readmission risk) of 23%. She has multiple doctors in many disciples: Ortho - Dr. Geoffrey Rahman; Rheumatologist - Praful Houston; Pulmonology - Pepe Joe; Hematology - Dr. Doni Hancock. Patient stated she follows with Oncology through [facility] but can't remember the doctor's name. She lives independently in a house. Her 2 daughters are very helpful. She has her prescriptions filled at [facility]. Her daughter, Yady, can provide her transport back home. CM scheduled a hospital f/u phone appointment with Dr. Yocasta Hernandez for Tuesday, 20 at 1pm.    Additional recommendations at discharge ***.     Sana Busch RN, BSN, CPHN, CM    AVS/Discharge Summary is the source of truth; this is a helpful guide for improved communication of patient story

## 2020-12-17 NOTE — ED PROVIDER NOTES
History   Chief Complaint:  Low sodium     HPI   Nicolás Wyman is a 80 year old female with history of hypertension, idiopathic interstitial pneumonia, rheumatoid arthritis, and non hodgkins lymphoma who presents with low sodium. The patient was at her doctor's office yesterday and had her blood drawn. The patient was notified today that her sodium was low at 116 and told to come into the ED for evaluation. Here, the patient also reports having low back pain and leg swelling since she fell 2 weeks ago. She had an xray of her low back while she was at her doctor's office yesterday which revealed an age-indeterminate L3 compression fracture, as well as a CT of the abdomen/pelvis in the ED on 12/15 which was negative for acute findings. She took tramadol this morning at home and notes feeling constipated. The patient denies any cough, abdominal pain, dizziness, light headedness, or sick contacts.  She has not had any syncope or seizures.  She reports feeling generally weak and reports ongoing low back pain but denies any other symptoms.  No saddle anesthesia.  No new numbness tingling or weakness in the lower extremities.  She denies any recent fever or infectious symptoms.  No cough, chest pain or shortness of breath.    Review of Systems   Respiratory: Negative for cough.    Cardiovascular: Positive for leg swelling.   Gastrointestinal: Positive for constipation. Negative for abdominal pain.   Musculoskeletal: Positive for back pain (low).   Neurological: Negative for dizziness and light-headedness.   All other systems reviewed and are negative.       Allergies:  Ace Inhibitors  Latex  Liquid Adhesive  Tape [Adhesive Tape]    Medications:  tramadol   Cozaar  carnitor  boniva  Plaquenil   Atenolol   Amlodipine     Past Medical History:    Anesthesia complication   Discoid lupus   Disorder of bone and cartilage, unspecified   Essential hypertension, benign   Glaucoma   Idiopathic interstitial pneumonia   Infected  prosthetic knee joint    Non Hodgkin's lymphoma   Pulmonary interstitial fibrosis   Rheumatoid arthritis  Steroid long-term use     Past Surgical History:    Arthroplasty knee  Hysterectomy  Gallbladder removal  Knee replacement     Family History:    Heart disease (Mother)  Hypertension (Mother)  Eye disorder (Mother)    Social History:  The patient lives at her house with her daughter.     Physical Exam     Patient Vitals for the past 24 hrs:   BP Temp Temp src Pulse Resp SpO2   12/17/20 1430 139/86 -- -- 80 -- 94 %   12/17/20 1352 (!) 179/84 98.4  F (36.9  C) Temporal 82 16 98 %       Physical Exam  General: Well appearing, nontoxic. Resting comfortably  Head:  Scalp, face, and head appear normal  Eyes:  Pupils are equal, round    Conjunctivae non-injected and sclerae white  ENT:    The external nose is normal    Pinnae are normal  Neck:  Normal range of motion    There is no rigidity noted    Trachea is in the midline  CV:  Regular rate and rhythm     Normal S1/S2, no S3/S4    No murmur or rub. Radial pulses 2+ bilaterally.  Resp:  Lungs are clear and equal bilaterally  There is no tachypnea    No increased work of breathing    No rales, wheezing, or rhonchi  GI:  Abdomen is soft, morbidly obese, no rigidity or guarding    No distension, or mass    No tenderness or rebound tenderness   MS:  Normal muscular tone    Symmetric motor strength    No lower extremity edema.  Significant bruising to the anterior left lower extremity below the knee without any focal bony tenderness.  Full range of motion ankle, foot, knee and the left lower extremity.  Moderate tenderness to palpation diffusely over the low lumbar paraspinal soft tissues and lumbar spine.  No overlying skin changes, erythema, crepitus, bony step-offs.  Bilateral upper extremities are atraumatic with full painless range of motion.  Skin:  No rash or acute skin lesions noted  Neuro: Awake and alert oriented x3.  Strength 5-5 and intact throughout.  No  facial droop.  Speech is normal and fluent  Moves all extremities spontaneously  Psych:  Normal affect. Appropriate interactions.      Emergency Department Course     Laboratory:  CBC:  WBC 9.3, HGB 12.4, , o/w WNL      CMP:  (LL), Chloride 83 (L), Albumin 3.1 (L), Protein total 6.5 (L), Creatinine 0.47 (L), o/w wnl      Magnesium: 2.1     Phosphorus: 2.8     Asymptomatic Influenza A/B & SARS-CoV2 (COVID-19) Virus PCR Multiplex: pending     Urine culture: pending     UA with micro and culture: Protein Albumin Urine 20 (A) Leukocyte esterase urine large (A) Bacteria few (A) WBC/HPF 24 (H) RBC/HPF 3 (H) Squamous epithelial/HPF urine 10 (H) Mucous urine present (A)  o/w wnl/negative       Creatinine random urine: pending     Fractional excretion of sodium (In process)    Emergency Department Course:    Reviewed:  I reviewed the patient's nursing notes, vitals, past medical records, Care Everywhere.     Assessments:  1650 Patient rechecked and updated.     Consults:   1620 I spoke with Dr. Acharya of the Hospitalist service from [facility] regarding patient's presentation, findings, and plan of care.      Interventions:  Medications   sodium chloride 0.9% infusion ( Intravenous Rate/Dose Change 12/17/20 1633)   0.9% sodium chloride BOLUS (0 mLs Intravenous Stopped 12/17/20 1621)   HYDROcodone-acetaminophen (NORCO) 5-325 MG per tablet 2 tablet (2 tablets Oral Given 12/17/20 1620)       Disposition:  Admitted.      Impression & Plan   Covid-19  Nicolás Wyman was evaluated during a global COVID-19 pandemic, which necessitated consideration that the patient might be at risk for infection with the SARS-CoV-2 virus that causes COVID-19.   Applicable protocols for evaluation were followed during the patient's care.   COVID-19 was considered as part of the patient's evaluation. The plan for testing is:  a test was obtained during this visit.    Medical Decision Making:  Nicolás Wyman is a 80 year old female  with past medical history as noted above who was referred to the emergency department by her primary care physician after her outpatient sodium level yesterday was noted to be 116.  The patient notes generalized weakness and fatigue but denies any other acute symptoms.  She is mentating normally and is alert and oriented.  No focal neurologic deficits.  She has had no syncope or seizure.  She notes ongoing low back pain and recent lumbar spine x-ray reveals an age-indeterminate L3 compression fracture.  CT of the abdomen pelvis on December 15 was negative for any acute findings.  Work-up in the emergency department today reveals a persistent severely low sodium at 117.  She was given 500 mL normal saline fluid bolus and normal saline was continued at 100 mL/h thereafter.  Urine sodium and creatinine was ordered.  Urinalysis shows some pyuria but this is a contaminated specimen.  Would await urine culture prior to initiating any antibiotics.  She has no other evidence of clinical infection.  Given her severe hyponatremia the patient will be admitted to the hospital for ongoing monitoring evaluation and treatment.  Cause is unclear at this time.  She remained stable during her entire ED course.  The case was discussed with hospitalist and the patient was admitted in stable condition.        Diagnosis:    ICD-10-CM    1. Hyponatremia  E87.1 Fractional excretion of sodium     UA reflex to Microscopic and Culture     Creatinine random urine   2. Bilateral low back pain without sciatica, unspecified chronicity  M54.5    3. Generalized muscle weakness  M62.81        Scribe Disclosure:  Bon MORROW, am serving as a scribe at 2:09 PM on 12/17/2020 to document services personally performed by Tanner Aburto MD based on my observations and the provider's statements to me.        Tanner Aburto MD  12/18/20 1120

## 2020-12-17 NOTE — ED NOTES
[facility]  ED Nurse Handoff Report    Nicolás Wyman is a 80 year old female   ED Chief complaint: Abnormal Labs  . ED Diagnosis:   Final diagnoses:   Hyponatremia   Bilateral low back pain without sciatica, unspecified chronicity   Generalized muscle weakness     Allergies:   Allergies   Allergen Reactions     Ace Inhibitors Cough     Latex GI Disturbance     lip swelling     Liquid Adhesive Rash     Tape [Adhesive Tape] Rash       Code Status: Full Code  Activity level - Baseline/Home:  Assist X 1. Activity Level - Current:   Assist X 1. Lift room needed: No. Bariatric: No   Needed: No   Isolation: No. Infection: Not Applicable.     Vital Signs:   Vitals:    12/17/20 1645 12/17/20 1700 12/17/20 1715 12/17/20 1730   BP: (!) 147/74 (!) 147/62 (!) 141/65 138/62   Pulse: 80 70 72 70   Resp:       Temp:       TempSrc:       SpO2: 97% 91% 93% 94%       Cardiac Rhythm:Sinus NSR  Pain level:  6/10  Patient confused: No. Patient Falls Risk: Yes.   Elimination Status: Has voided   Patient Report - Initial Complaint: Abnormal labs from clinical yesterday. Focused Assessment: Patient lives with daughter and son-in-law. Uses a walker at home to get around. Fell about one week ago. Had CT done no broken bones found has bruising over left side of torso,aznd left leg. Does appear SOB with activity but patient states that is how she normally is. C/O low back chronic Narco given as ordered. 500cc NS bolus given. Did ambulate to bathroom with walker and one assist tolerated well.    Tests Performed: labs. Abnormal Results: Na 117.   Treatments provided: Narco and IVF bolus  Family Comments: daughter is aware of patients addmission   OBS brochure/video discussed/provided to patient:  N/A  ED Medications:   Medications   sodium chloride 0.9% infusion ( Intravenous Rate/Dose Change 12/17/20 1633)   0.9% sodium chloride BOLUS (0 mLs Intravenous Stopped 12/17/20 1621)   HYDROcodone-acetaminophen (NORCO) 5-325  MG per tablet 2 tablet (2 tablets Oral Given 12/17/20 2970)     Drips infusing:  No  For the majority of the shift, the patient's behavior Green. Interventions performed were none.    Sepsis treatment initiated: No     Patient tested for COVID 19 prior to admission: YES  RECEIVING UNIT ED HANDOFF REVIEW    Above ED Nurse Handoff Report was reviewed: YES  Reviewed by: Lexi Barry RN on December 17, 2020 at 6:46 PM     ED Nurse Name/Phone Number: Melvi Patricia RN

## 2020-12-18 LAB
ANION GAP SERPL CALCULATED.3IONS-SCNC: 5 MMOL/L (ref 3–14)
BUN SERPL-MCNC: 11 MG/DL (ref 7–30)
CALCIUM SERPL-MCNC: 8.4 MG/DL (ref 8.5–10.1)
CHLORIDE SERPL-SCNC: 87 MMOL/L (ref 94–109)
CO2 SERPL-SCNC: 28 MMOL/L (ref 20–32)
CREAT SERPL-MCNC: 0.49 MG/DL (ref 0.52–1.04)
GFR SERPL CREATININE-BSD FRML MDRD: >90 ML/MIN/{1.73_M2}
GLUCOSE SERPL-MCNC: 92 MG/DL (ref 70–99)
OSMOLALITY UR: 345 MMOL/KG (ref 100–1200)
POTASSIUM SERPL-SCNC: 4.1 MMOL/L (ref 3.4–5.3)
SODIUM SERPL-SCNC: 119 MMOL/L (ref 133–144)
SODIUM SERPL-SCNC: 119 MMOL/L (ref 133–144)
SODIUM SERPL-SCNC: 120 MMOL/L (ref 133–144)
SODIUM SERPL-SCNC: 121 MMOL/L (ref 133–144)
T4 FREE SERPL-MCNC: 1.05 NG/DL (ref 0.76–1.46)
TSH SERPL DL<=0.005 MIU/L-ACNC: 5.01 MU/L (ref 0.4–4)

## 2020-12-18 PROCEDURE — 83935 ASSAY OF URINE OSMOLALITY: CPT | Performed by: INTERNAL MEDICINE

## 2020-12-18 PROCEDURE — 80048 BASIC METABOLIC PNL TOTAL CA: CPT | Performed by: INTERNAL MEDICINE

## 2020-12-18 PROCEDURE — 84295 ASSAY OF SERUM SODIUM: CPT | Performed by: INTERNAL MEDICINE

## 2020-12-18 PROCEDURE — 36415 COLL VENOUS BLD VENIPUNCTURE: CPT | Performed by: INTERNAL MEDICINE

## 2020-12-18 PROCEDURE — 250N000009 HC RX 250: Performed by: INTERNAL MEDICINE

## 2020-12-18 PROCEDURE — 250N000011 HC RX IP 250 OP 636: Performed by: INTERNAL MEDICINE

## 2020-12-18 PROCEDURE — 84443 ASSAY THYROID STIM HORMONE: CPT | Performed by: INTERNAL MEDICINE

## 2020-12-18 PROCEDURE — 999N000157 HC STATISTIC RCP TIME EA 10 MIN

## 2020-12-18 PROCEDURE — 250N000012 HC RX MED GY IP 250 OP 636 PS 637: Performed by: INTERNAL MEDICINE

## 2020-12-18 PROCEDURE — 84439 ASSAY OF FREE THYROXINE: CPT | Performed by: INTERNAL MEDICINE

## 2020-12-18 PROCEDURE — 99232 SBSQ HOSP IP/OBS MODERATE 35: CPT | Performed by: INTERNAL MEDICINE

## 2020-12-18 PROCEDURE — 94640 AIRWAY INHALATION TREATMENT: CPT | Mod: 76

## 2020-12-18 PROCEDURE — 120N000001 HC R&B MED SURG/OB

## 2020-12-18 PROCEDURE — 94640 AIRWAY INHALATION TREATMENT: CPT

## 2020-12-18 PROCEDURE — 250N000013 HC RX MED GY IP 250 OP 250 PS 637: Performed by: INTERNAL MEDICINE

## 2020-12-18 RX ORDER — CARBOXYMETHYLCELLULOSE SODIUM 5 MG/ML
2 SOLUTION/ DROPS OPHTHALMIC
Status: DISCONTINUED | OUTPATIENT
Start: 2020-12-18 | End: 2020-12-21 | Stop reason: HOSPADM

## 2020-12-18 RX ORDER — SODIUM CHLORIDE 9 MG/ML
INJECTION, SOLUTION INTRAVENOUS CONTINUOUS
Status: DISCONTINUED | OUTPATIENT
Start: 2020-12-18 | End: 2020-12-18

## 2020-12-18 RX ORDER — HYDROXYZINE HYDROCHLORIDE 50 MG/1
50 TABLET, FILM COATED ORAL EVERY 6 HOURS PRN
Status: DISCONTINUED | OUTPATIENT
Start: 2020-12-18 | End: 2020-12-21 | Stop reason: HOSPADM

## 2020-12-18 RX ORDER — AMOXICILLIN 250 MG
1 CAPSULE ORAL AT BEDTIME
Status: DISCONTINUED | OUTPATIENT
Start: 2020-12-18 | End: 2020-12-21 | Stop reason: HOSPADM

## 2020-12-18 RX ORDER — BISACODYL 10 MG
10 SUPPOSITORY, RECTAL RECTAL DAILY
Status: DISCONTINUED | OUTPATIENT
Start: 2020-12-19 | End: 2020-12-20

## 2020-12-18 RX ORDER — HYDROXYZINE HYDROCHLORIDE 25 MG/1
25 TABLET, FILM COATED ORAL EVERY 6 HOURS PRN
Status: DISCONTINUED | OUTPATIENT
Start: 2020-12-18 | End: 2020-12-21 | Stop reason: HOSPADM

## 2020-12-18 RX ADMIN — ALBUTEROL SULFATE 2.5 MG: 2.5 SOLUTION RESPIRATORY (INHALATION) at 10:53

## 2020-12-18 RX ADMIN — HYDROCODONE BITARTRATE AND ACETAMINOPHEN 1 TABLET: 5; 325 TABLET ORAL at 17:45

## 2020-12-18 RX ADMIN — CARBOXYMETHYLCELLULOSE SODIUM 2 DROP: 5 SOLUTION/ DROPS OPHTHALMIC at 00:44

## 2020-12-18 RX ADMIN — OFLOXACIN 2 DROP: 3 SOLUTION/ DROPS OPHTHALMIC at 20:58

## 2020-12-18 RX ADMIN — HYDROXYCHLOROQUINE SULFATE 200 MG: 200 TABLET, FILM COATED ORAL at 08:51

## 2020-12-18 RX ADMIN — ASPIRIN 81 MG: 81 TABLET ORAL at 08:51

## 2020-12-18 RX ADMIN — OFLOXACIN 2 DROP: 3 SOLUTION/ DROPS OPHTHALMIC at 12:16

## 2020-12-18 RX ADMIN — ENOXAPARIN SODIUM 40 MG: 40 INJECTION SUBCUTANEOUS at 18:38

## 2020-12-18 RX ADMIN — ALBUTEROL SULFATE 2.5 MG: 2.5 SOLUTION RESPIRATORY (INHALATION) at 19:18

## 2020-12-18 RX ADMIN — HYDROCODONE BITARTRATE AND ACETAMINOPHEN 1 TABLET: 5; 325 TABLET ORAL at 12:07

## 2020-12-18 RX ADMIN — OFLOXACIN 2 DROP: 3 SOLUTION/ DROPS OPHTHALMIC at 09:01

## 2020-12-18 RX ADMIN — CARBOXYMETHYLCELLULOSE SODIUM 1 DROP: 5 SOLUTION/ DROPS OPHTHALMIC at 08:54

## 2020-12-18 RX ADMIN — PREDNISONE 5 MG: 5 TABLET ORAL at 08:51

## 2020-12-18 RX ADMIN — HYDROCODONE BITARTRATE AND ACETAMINOPHEN 1 TABLET: 5; 325 TABLET ORAL at 03:37

## 2020-12-18 RX ADMIN — POLYETHYLENE GLYCOL 3350 17 G: 17 POWDER, FOR SOLUTION ORAL at 08:50

## 2020-12-18 RX ADMIN — DOCUSATE SODIUM AND SENNOSIDES 1 TABLET: 8.6; 5 TABLET ORAL at 20:56

## 2020-12-18 RX ADMIN — HYDROXYCHLOROQUINE SULFATE 200 MG: 200 TABLET, FILM COATED ORAL at 20:49

## 2020-12-18 RX ADMIN — ATENOLOL 100 MG: 25 TABLET ORAL at 08:51

## 2020-12-18 RX ADMIN — DOCUSATE SODIUM 100 MG: 100 CAPSULE, LIQUID FILLED ORAL at 08:51

## 2020-12-18 RX ADMIN — HYDROCODONE BITARTRATE AND ACETAMINOPHEN 1 TABLET: 5; 325 TABLET ORAL at 07:31

## 2020-12-18 RX ADMIN — CARBOXYMETHYLCELLULOSE SODIUM 1 DROP: 5 SOLUTION/ DROPS OPHTHALMIC at 20:57

## 2020-12-18 RX ADMIN — LOSARTAN POTASSIUM 100 MG: 100 TABLET, FILM COATED ORAL at 08:51

## 2020-12-18 RX ADMIN — AMLODIPINE BESYLATE 7.5 MG: 5 TABLET ORAL at 08:51

## 2020-12-18 RX ADMIN — BISACODYL 10 MG: 10 SUPPOSITORY RECTAL at 15:35

## 2020-12-18 RX ADMIN — OFLOXACIN 2 DROP: 3 SOLUTION/ DROPS OPHTHALMIC at 17:36

## 2020-12-18 RX ADMIN — CARBOXYMETHYLCELLULOSE SODIUM 1 DROP: 5 SOLUTION/ DROPS OPHTHALMIC at 17:43

## 2020-12-18 RX ADMIN — CEFTRIAXONE 1 G: 1 INJECTION, POWDER, FOR SOLUTION INTRAMUSCULAR; INTRAVENOUS at 20:52

## 2020-12-18 ASSESSMENT — ACTIVITIES OF DAILY LIVING (ADL)
ADLS_ACUITY_SCORE: 16

## 2020-12-18 ASSESSMENT — MIFFLIN-ST. JEOR: SCORE: 1292.35

## 2020-12-18 NOTE — CONSULTS
[facility]    Nephrology Consultation     Date of Admission:  12/17/2020    Assessment & Plan     Nicolás Wyman is a 80 year old female who was admitted on 12/17/2020.     1) Baseline mild hyponatremia:  Na has been ~130 since 2011.  Perhaps a reset osmostat.    2) Severe Hyponatremia:  Duration unclear.  U na 41 so suspect this is not depletional hyponatremia.  U osm is in process, but suspicious for SIADH.  Normal thyroid function.  On chronic prednisone so doubt hypoadrenal.  No medications that lead to SIADH.  It may be that the pulmonary fibrosis + pain from vertebral compression fractures is enough to lead to ADH release.      3) Painful Vertebral Compression Fractures.    4) Pulmonary Fibrosis.    Plan:    Await U osm  FR to 1200 mL per day  Follow Na Q12 H     Juan Manuel Chirinos MD  [facility] - Nephrology  [phone]    Reason for Consult     I was asked to see the patient for hyponatremia.      Primary Care Physician     Yocasta Hernandez    Chief Complaint     Hyponatremia    History is obtained from the patient and chart review.      History of Present Illness     Nicolás Wyman is a 80 year old female who presents with hyponatremia.    She was admitted 12/17/20 via ED after presenting with abnormal labs.    She had a sodium of 116 drawn as an outpt.    She has had a baseline sodium of ~130 as far back as 2011.    She had been experiencing low back pain due to a vertebral compression fracture.  She has been on tramadol.        With normal saline her sodium did initially increase to 121, later falling to 119.      U na 41.      U osm has not been completed.      TSH is near normal - 5.01.  Free T4 1.05.    She has pulmonary fibrosis.  She is chronically on prednisone 5 mg daily.      Past Medical History   I have reviewed this patient's medical history and updated it with pertinent information if needed.   Past Medical History:   Diagnosis Date     Anesthesia complication      hypoxia postop     Discoid lupus      Disorder of bone and cartilage, unspecified      Essential hypertension, benign      Glaucoma      Idiopathic interstitial pneumonia 11/02    Pulmonary fibrosis, hypoxia postop     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    removal right TKA 6/12     Non Hodgkin's lymphoma (H) 6/14    chemo x4, clearing by PET     Pulmonary interstitial fibrosis (H)      Rheumatoid arthritis(714.0)     [facility]     Steroid long-term use        Past Surgical History   I have reviewed this patient's surgical history and updated it with pertinent information if needed.  Past Surgical History:   Procedure Laterality Date     ARTHROPLASTY KNEE  11/7/2011    Procedure:ARTHROPLASTY KNEE; Right Total Knee Arthroplasty  [facility]; Surgeon:DIVINA BAUTISTA; Location:[facility] OR     ARTHROPLASTY REVISION KNEE  12/29/2011    Procedure:ARTHROPLASTY REVISION KNEE; Irrigation and debridement, right total knee arthroplasty with polyethylene exchange.  ; Surgeon:DIVINA BAUTISTA; Location: OR     C VAGINAL HYSTERECTOMY  1981    has ovaries      EXCHANGE POLY COMPONENT ARTHROPLASTY KNEE  11/26/2011    Procedure:EXCHANGE POLY COMPONENT ARTHROPLASTY KNEE; EXCHANGE POLY COMPONENT ARTHROPLASTY KNEE RIGHT, irrigation and debridement right knee; Surgeon:FELIPE TAYLOR; Location:[facility] OR     HC CORRECT BUNION,SIMPLE  2002     HC REMOVAL GALLBLADDER  1981     IRRIGATION AND DEBRIDEMENT KNEE, COMBINED  11/26/2011    Procedure:COMBINED IRRIGATION AND DEBRIDEMENT KNEE; Surgeon:FELIPE TAYLOR; Location: OR     IRRIGATION AND DEBRIDEMENT KNEE, COMBINED  12/29/2011    Procedure:COMBINED IRRIGATION AND DEBRIDEMENT KNEE; Surgeon:DIVINA BAUTISTA; Location: OR     IRRIGATION AND DEBRIDEMENT KNEE, COMBINED  2/9/2012    Procedure:COMBINED IRRIGATION AND DEBRIDEMENT KNEE; Wound Debridement Right Knee with Placement of Wound Vac; Surgeon:DIVINA BAUTISTA; Location: OR     [facility] NONSPECIFIC  PROCEDURE  6/22/12    removal of right TKA       Prior to Admission Medications   Prior to Admission Medications   Prescriptions Last Dose Informant Patient Reported? Taking?   Glucosamine 500 MG CAPS 12/17/2020 at am  Yes Yes   Sig: Take 1 capful by mouth daily.   IBANdronate (BONIVA) 150 MG tablet More than a month at Unknown time  No No   Sig: Take 1 tablet (150 mg) by mouth every 30 days Take 60 minutes before am meal with 8 oz. water. Remain upright for 60 minutes.   acetylcysteine (N-ACETYL CYSTEINE) 600 MG CAPS capsule 12/17/2020 at am  Yes Yes   Sig: Take 1 capsule (600 mg) by mouth daily   albuterol (2.5 MG/3ML) 0.083% nebulizer solution 12/17/2020 at am  No Yes   Sig: Take 1 vial (2.5 mg) by nebulization every 6 hours as needed   albuterol (PROAIR HFA, PROVENTIL HFA, VENTOLIN HFA) 108 (90 BASE) MCG/ACT inhaler 12/17/2020 at am  No Yes   Sig: Inhale 2 puffs into the lungs every 6 hours as needed for shortness of breath / dyspnea   amLODIPine (NORVASC) 5 MG tablet 12/17/2020 at am time  No Yes   Sig: TAKE 1 & 1/2 (ONE & ONE-HALF) TABLETS BY MOUTH ONCE DAILY   aspirin 81 MG tablet 12/17/2020 at am  Yes Yes   Sig: Take 1 tablet (81 mg) by mouth daily   atenolol (TENORMIN) 100 MG tablet 12/17/2020 at am  No Yes   Sig: Take 1 tablet (100 mg) by mouth daily   cholecalciferol (VITAMIN D3) 1000 UNIT tablet 12/17/2020 at am  Yes Yes   Sig: Take 1 tablet (1,000 Units) by mouth daily   doxycycline (VIBRAMYCIN) 100 MG capsule 12/17/2020 at 1x  No Yes   Sig: Take 1 capsule by mouth 2 times daily.   ferrous sulfate (IRON) 325 (65 Fe) MG tablet 12/16/2020 at pm  Yes Yes   Sig: Take 1 tablet (325 mg) by mouth daily (with breakfast)   hydroxychloroquine (PLAQUENIL) 200 MG tablet 12/17/2020 at 1x  Yes Yes   Sig: Take 1 tablet by mouth 2 times daily.   levOCARNitine (CARNITOR) 330 MG tablet   Yes No   Sig: Take 990 mg by mouth 2 times daily    losartan (COZAAR) 100 MG tablet 12/17/2020 at am  No Yes   Sig: Take 1 tablet (100  mg) by mouth daily   predniSONE (DELTASONE) 5 MG tablet 12/17/2020 at am  Yes Yes   Sig: Take 5 mg by mouth daily    traMADol (ULTRAM) 50 MG tablet 12/17/2020 at am  Yes Yes   Sig: Take 50 mg by mouth 3 times daily      Facility-Administered Medications: None     Allergies   Allergies   Allergen Reactions     Ace Inhibitors Cough     Latex GI Disturbance     lip swelling     Liquid Adhesive Rash     Tape [Adhesive Tape] Rash       Social History   I have reviewed this patient's social history and updated it with pertinent information if needed. Nicolás Wyman  reports that she has never smoked. She has never used smokeless tobacco. She reports that she does not drink alcohol or use drugs.    Family History   I have reviewed this patient's family history and updated it with pertinent information if needed.   Family History   Problem Relation Age of Onset     Heart Disease Mother      Hypertension Mother      Eye Disorder Mother         macular degen     Family History Negative Father        Review of Systems   The 10 point Review of Systems is negative other than noted in the HPI.     Physical Exam   Temp: 97.8  F (36.6  C) Temp src: Oral BP: (!) 158/75 Pulse: 82   Resp: 20 SpO2: 93 % O2 Device: None (Room air)    Vital Signs with Ranges  Temp:  [97.8  F (36.6  C)-98.2  F (36.8  C)] 97.8  F (36.6  C)  Pulse:  [70-82] 82  Resp:  [16-22] 20  BP: (126-158)/(52-75) 158/75  SpO2:  [91 %-97 %] 93 %  202 lbs 1.6 oz    GENERAL: alert, NAD, sitting up in chair  HEENT:  Normocephalic. No gross abnormalities.  Pupils equal.  MMM.  Dentition is ok.  CV: RRR, no murmurs, no clicks, gallops, or rubs,  No edema, no carotid bruits  RESP: Insp crackles bilaterally  GI: Abdomen soft/nt/nd, BS normal. No masses, organomegaly  MUSCULOSKELETAL: Joint deformities of RA - MCP synovial thickening.    SKIN: ecchymoses LLE  NEURO:  Globally weak.   PSYCH: mood good, affect appropriate  LYMPH: No palpable ant/post cervical and supraclavicular  adenopathy    Data   BMP  Recent Labs   Lab 12/18/20  1144 12/18/20  0645 12/17/20  2353 12/17/20  2004 12/17/20  1456 12/16/20  1445 12/15/20  1232   * 120* 121* 118* 117* 116* 121*   POTASSIUM  --  4.1  --  3.9 4.4 4.5 4.2   CHLORIDE  --  87*  --   --  83* 82* 85*   KRISTOPHER  --  8.4*  --   --  8.5 8.3* 8.7   CO2  --  28  --   --  30 26 30   BUN  --  11  --   --  12 9 10   CR  --  0.49*  --   --  0.47* 0.44* 0.50*   GLC  --  92  --   --  88 104* 108*     Phos@[email or web address](phos:4)  CBC)  Recent Labs   Lab 12/17/20  1456 12/15/20  1232   WBC 9.3 10.0   HGB 12.4 13.6   HCT 36.7 40.2   MCV 91 92    303     Recent Labs   Lab 12/17/20  1456   AST 31   ALT 33   ALKPHOS 69   BILITOTAL 0.9     No lab results found in last 7 days.  No results found for: D2VIT, D3VIT, DTOT  Recent Labs   Lab 12/17/20  1456   HGB 12.4   HCT 36.7   MCV 91     No results for input(s): PTHI in the last 168 hours.

## 2020-12-18 NOTE — PROVIDER NOTIFICATION
DATE:  12/17/2020   TIME OF RECEIPT FROM LAB:  2040  LAB TEST:  sodium  LAB VALUE:  118  RESULTS GIVEN WITH READ-BACK TO (PROVIDER):     TIME LAB VALUE REPORTED TO PROVIDER:   2045

## 2020-12-18 NOTE — PLAN OF CARE
End of Shift Summary  For vital signs and complete assessments, please see documentation flowsheets.     Pertinent assessments: A&O x4, VSS. Up assist of 1 gait-belt & walker. Neuro's intact. Seizure precautions. Large bruising noted on LUE and LLE d/t fall per patient.   Major Shift Events: Admitted to floor, sodium 118.  Treatment Plan: NS @ 50/hr, neuro's q4, seizure precautions. Sodium checks q6. Rocephin for UTI.

## 2020-12-18 NOTE — PROGRESS NOTES
[facility]  Hospitalist Progress Note    Assessment & Plan   Nicolás Wyman is a 80 year old female with PMH including hypertension, pulmonary fibrosis, non-Hodgkin's lymphoma, RA with prior history of long-term steroid use and osteoporosis who presents with low back pain in the setting of a recently diagnosed compression fracture found to have worsening hyponatremia for which she was referred to the emergency room from clinic.    Subacute severe hyponatremia: Sodium is 117 on admission.  Suspected to be chronic to subacute in nature. FeNA 0.4. Urine sodium 41. Given 500 ml normal slaine bolus in ED.   -Nephrology consulted appreciate assistance  -NS 50 mL an hour restarting.  Recheck sodium at noon increased to 119 once IV fluids were discontinued.  -Continue sodium checks  --Goal correction is approximately 4-6 mEq/day     Possible urinary tract infection  -UA showed few bacteria, 24 WBC, large leukocyte esterase.  Empirically started on ceftriaxone.  Urine culture no growth to date.  Negative counseling and antibiotics in AM.     History of hypertension:  PTA losartan 100 mg daily, amlodipine 7.5 mg daily, losartan 100 mg daily     Recent issues with urinary retention: Was seen on 12/15 in the ER for this issue.  CT scan did not show any evidence of hydronephrosis and she was able to void and was discharged home.  Suspect this may be related to narcotic use for back.  --Monitor with bladder scans, as needed straight cath.     History of rheumatoid arthritis:  PTA Plaquenil and prednisone 5 mg.       History of pulmonary fibrosis: Not currently hypoxic.  No apparent acute issues.     Low back pain due to L3 compression fracture: Diagnosed about 2 weeks ago. Continue Tylenol, bowel regimen and as needed Norco.     Constipation: No bowel movement in about 7 days.  Suspect related to narcotic pain medications used to treat her compression fracture.  --Schedule senna-doc, MiraLAX twice daily,  and suppository   --Monitor for response, might need to consider an enema in the morning.     Conjunctivitis possibly bacterial:  Getting IV ceftriaxone for possible UTI as above.  Will provide some ofloxacin drops for now.      Morbid Obesity BMI 40: Complicates cares     FEN: NS 50 mL/h, monitor, regular  Activity: PT  DVT Prophylaxis: Enoxaparin (Lovenox) SQ  Family update: Yes, updated Dianne while in the room.     Code Status: Full Code    Expected discharge: 2 - 3 days, recommended to TBD once sodium improves.    Myah Wyatt, DO    Text Page (7am - 6pm, M-F)    Interval History   Patient is not feeling well today. Pain in lower back secondary to compression fracture. Tolerable with pain management. No abdominal pain. No dysuria or hematuria. No chest pain. No respiratory symptoms. Discussed with nursing.     -Data reviewed today: I reviewed all new labs and imaging results over the last 24 hours. I personally reviewed     Physical Exam   Temp: 97.7  F (36.5  C) Temp src: Oral BP: (!) 160/64 Pulse: 77   Resp: 20 SpO2: 95 % O2 Device: None (Room air)    Vitals:    12/1940 12/18/20 0607   Weight: 93.8 kg (206 lb 12.8 oz) 91.7 kg (202 lb 1.6 oz)     Vital Signs with Ranges  Temp:  [97.7  F (36.5  C)-98.2  F (36.8  C)] 97.7  F (36.5  C)  Pulse:  [72-82] 79  Resp:  [16-22] 20  BP: (126-160)/(52-75) 160/64  SpO2:  [93 %-97 %] 95 %  I/O last 3 completed shifts:  In: 990 [P.O.:780; I.V.:210]  Out: 770 [Urine:770]    Constitutional: Awake, alert, cooperative, no apparent distress. Non-toxic. Appears stated age.   HEENT: Atraumatic. Normocephalic. Conjunctiva non-injected. Sclera anicteric. MMM.   Respiratory: Moves air bilaterally. Clear to auscultation bilaterally, no crackles or wheezing  Cardiovascular: Regular rate and rhythm, normal S1 and S2, and no murmur noted  GI: Normal bowel sounds, soft, non-distended, non-tender  Skin/Integumen: No rashes, no cyanosis, no edema. Left lower leg with significant  ecchymosis.     Medications       amLODIPine  7.5 mg Oral Daily     aspirin  81 mg Oral Daily     atenolol  100 mg Oral Daily     [START ON 12/19/2020] bisacodyl  10 mg Rectal Daily     artificial tears ophthalmic solution  1 drop Both Eyes TID     cefTRIAXone  1 g Intravenous Q24H     docusate sodium  100 mg Oral BID     hydroxychloroquine  200 mg Oral BID     losartan  100 mg Oral Daily     ofloxacin  2 drop Both Eyes 4x Daily     polyethylene glycol  17 g Oral BID     predniSONE  5 mg Oral Daily     sodium chloride (PF)  3 mL Intracatheter Q8H     Data   Recent Labs   Lab 12/18/20  1144 12/18/20  0645 12/17/20  2353 12/17/20 2004 12/17/20  1456 12/16/20  1445 12/15/20  1232   WBC  --   --   --   --  9.3  --  10.0   HGB  --   --   --   --  12.4  --  13.6   MCV  --   --   --   --  91  --  92   PLT  --   --   --   --  302  --  303   * 120* 121* 118* 117* 116* 121*   POTASSIUM  --  4.1  --  3.9 4.4 4.5 4.2   CHLORIDE  --  87*  --   --  83* 82* 85*   CO2  --  28  --   --  30 26 30   BUN  --  11  --   --  12 9 10   CR  --  0.49*  --   --  0.47* 0.44* 0.50*   ANIONGAP  --  5  --   --  4 8 6   KRISTOPHER  --  8.4*  --   --  8.5 8.3* 8.7   GLC  --  92  --   --  88 104* 108*   ALBUMIN  --   --   --   --  3.1*  --   --    PROTTOTAL  --   --   --   --  6.5*  --   --    BILITOTAL  --   --   --   --  0.9  --   --    ALKPHOS  --   --   --   --  69  --   --    ALT  --   --   --   --  33  --   --    AST  --   --   --   --  31  --   --      No results found for this or any previous visit (from the past 24 hour(s)).

## 2020-12-18 NOTE — PLAN OF CARE
End of Shift Summary  For vital signs and complete assessments, please see documentation flowsheets.     Pertinent assessments: AO. VSS. Afebrile. LS diminished. BS hypoactive. Pt reports back pain, received Narco. BLE edema, encouraging elevation. Regular diet, good appetite. Neuro checks per orders, unchanged. Drainage noted from (L) eye, no redness noted. Tele SR.    Major Shift Events: straight cath. Nephrology consulted.     Treatment Plan: Neuro Checks every 4 hours, Rocephin. PVR, straight cath as needed.

## 2020-12-18 NOTE — PROGRESS NOTES
[facility]  Had received referral from PCP for Home PT, admission pending. Will need new order for home care at time of discharge, if appropriate.

## 2020-12-18 NOTE — PLAN OF CARE
End of Shift Summary  For vital signs and complete assessments, please see documentation flowsheets.     Pertinent assessments: VSS. Afebrile. LS diminished. BS hypoactive. Pt reports back pain, received Narco. Regular diet, good appetite. PIV - NS @ 50 ml/hr; stopped per order at 0100. Neuro checks unchanged. Drainage noted from (L) eye, no redness noted. Tele monitoring - SR, HR 70s. A&O. Slept well.   Major Shift Events: none   Treatment Plan: Neuro Checks every 4 hours, Rocephin

## 2020-12-19 ENCOUNTER — APPOINTMENT (OUTPATIENT)
Dept: PHYSICAL THERAPY | Facility: CLINIC | Age: 80
DRG: 644 | End: 2020-12-19
Payer: COMMERCIAL

## 2020-12-19 ENCOUNTER — ANESTHESIA EVENT (OUTPATIENT)
Dept: MEDSURG UNIT | Facility: CLINIC | Age: 80
End: 2020-12-19

## 2020-12-19 ENCOUNTER — ANESTHESIA (OUTPATIENT)
Dept: MEDSURG UNIT | Facility: CLINIC | Age: 80
End: 2020-12-19

## 2020-12-19 LAB
ANION GAP SERPL CALCULATED.3IONS-SCNC: 4 MMOL/L (ref 3–14)
BACTERIA SPEC CULT: ABNORMAL
BACTERIA SPEC CULT: ABNORMAL
BUN SERPL-MCNC: 7 MG/DL (ref 7–30)
CALCIUM SERPL-MCNC: 8.2 MG/DL (ref 8.5–10.1)
CHLORIDE SERPL-SCNC: 89 MMOL/L (ref 94–109)
CO2 SERPL-SCNC: 30 MMOL/L (ref 20–32)
CREAT SERPL-MCNC: 0.39 MG/DL (ref 0.52–1.04)
GFR SERPL CREATININE-BSD FRML MDRD: >90 ML/MIN/{1.73_M2}
GLUCOSE SERPL-MCNC: 125 MG/DL (ref 70–99)
Lab: ABNORMAL
POTASSIUM SERPL-SCNC: 3.9 MMOL/L (ref 3.4–5.3)
SODIUM SERPL-SCNC: 122 MMOL/L (ref 133–144)
SODIUM SERPL-SCNC: 123 MMOL/L (ref 133–144)
SODIUM SERPL-SCNC: 123 MMOL/L (ref 133–144)
SPECIMEN SOURCE: ABNORMAL

## 2020-12-19 PROCEDURE — 84295 ASSAY OF SERUM SODIUM: CPT | Performed by: INTERNAL MEDICINE

## 2020-12-19 PROCEDURE — 250N000012 HC RX MED GY IP 250 OP 636 PS 637: Performed by: INTERNAL MEDICINE

## 2020-12-19 PROCEDURE — 36415 COLL VENOUS BLD VENIPUNCTURE: CPT | Performed by: INTERNAL MEDICINE

## 2020-12-19 PROCEDURE — 97116 GAIT TRAINING THERAPY: CPT | Mod: GP | Performed by: PHYSICAL THERAPIST

## 2020-12-19 PROCEDURE — 94640 AIRWAY INHALATION TREATMENT: CPT

## 2020-12-19 PROCEDURE — 250N000011 HC RX IP 250 OP 636: Performed by: INTERNAL MEDICINE

## 2020-12-19 PROCEDURE — 97161 PT EVAL LOW COMPLEX 20 MIN: CPT | Mod: GP | Performed by: PHYSICAL THERAPIST

## 2020-12-19 PROCEDURE — 99232 SBSQ HOSP IP/OBS MODERATE 35: CPT | Performed by: INTERNAL MEDICINE

## 2020-12-19 PROCEDURE — 250N000013 HC RX MED GY IP 250 OP 250 PS 637: Performed by: INTERNAL MEDICINE

## 2020-12-19 PROCEDURE — 120N000001 HC R&B MED SURG/OB

## 2020-12-19 PROCEDURE — 370N000003 HC ANESTHESIA WARD SERVICE

## 2020-12-19 PROCEDURE — 97530 THERAPEUTIC ACTIVITIES: CPT | Mod: GP | Performed by: PHYSICAL THERAPIST

## 2020-12-19 PROCEDURE — 999N000157 HC STATISTIC RCP TIME EA 10 MIN

## 2020-12-19 PROCEDURE — 999N000011 HC STATISTIC ANESTHESIA CASE

## 2020-12-19 PROCEDURE — 250N000009 HC RX 250: Performed by: INTERNAL MEDICINE

## 2020-12-19 PROCEDURE — 80048 BASIC METABOLIC PNL TOTAL CA: CPT | Performed by: INTERNAL MEDICINE

## 2020-12-19 RX ADMIN — CEFTRIAXONE 1 G: 1 INJECTION, POWDER, FOR SOLUTION INTRAMUSCULAR; INTRAVENOUS at 20:03

## 2020-12-19 RX ADMIN — LOSARTAN POTASSIUM 100 MG: 100 TABLET, FILM COATED ORAL at 08:12

## 2020-12-19 RX ADMIN — HYDROCODONE BITARTRATE AND ACETAMINOPHEN 1 TABLET: 5; 325 TABLET ORAL at 05:38

## 2020-12-19 RX ADMIN — ALBUTEROL SULFATE 2.5 MG: 2.5 SOLUTION RESPIRATORY (INHALATION) at 18:21

## 2020-12-19 RX ADMIN — AMLODIPINE BESYLATE 7.5 MG: 5 TABLET ORAL at 08:11

## 2020-12-19 RX ADMIN — OFLOXACIN 2 DROP: 3 SOLUTION/ DROPS OPHTHALMIC at 22:29

## 2020-12-19 RX ADMIN — CARBOXYMETHYLCELLULOSE SODIUM 1 DROP: 5 SOLUTION/ DROPS OPHTHALMIC at 22:29

## 2020-12-19 RX ADMIN — HYDROCODONE BITARTRATE AND ACETAMINOPHEN 1 TABLET: 5; 325 TABLET ORAL at 00:36

## 2020-12-19 RX ADMIN — DOCUSATE SODIUM AND SENNOSIDES 1 TABLET: 8.6; 5 TABLET ORAL at 22:50

## 2020-12-19 RX ADMIN — POLYETHYLENE GLYCOL 3350 17 G: 17 POWDER, FOR SOLUTION ORAL at 08:10

## 2020-12-19 RX ADMIN — OFLOXACIN 2 DROP: 3 SOLUTION/ DROPS OPHTHALMIC at 12:45

## 2020-12-19 RX ADMIN — HYDROXYCHLOROQUINE SULFATE 200 MG: 200 TABLET, FILM COATED ORAL at 20:55

## 2020-12-19 RX ADMIN — ASPIRIN 81 MG: 81 TABLET ORAL at 08:17

## 2020-12-19 RX ADMIN — POLYETHYLENE GLYCOL 3350 17 G: 17 POWDER, FOR SOLUTION ORAL at 20:55

## 2020-12-19 RX ADMIN — HYDROXYZINE HYDROCHLORIDE 50 MG: 50 TABLET, FILM COATED ORAL at 23:38

## 2020-12-19 RX ADMIN — CARBOXYMETHYLCELLULOSE SODIUM 1 DROP: 5 SOLUTION/ DROPS OPHTHALMIC at 08:12

## 2020-12-19 RX ADMIN — TRAMADOL HYDROCHLORIDE 25 MG: 50 TABLET, FILM COATED ORAL at 12:44

## 2020-12-19 RX ADMIN — TRAMADOL HYDROCHLORIDE 25 MG: 50 TABLET, FILM COATED ORAL at 18:34

## 2020-12-19 RX ADMIN — ATENOLOL 100 MG: 25 TABLET ORAL at 08:11

## 2020-12-19 RX ADMIN — HYDROXYZINE HYDROCHLORIDE 25 MG: 25 TABLET, FILM COATED ORAL at 08:13

## 2020-12-19 RX ADMIN — CARBOXYMETHYLCELLULOSE SODIUM 1 DROP: 5 SOLUTION/ DROPS OPHTHALMIC at 15:40

## 2020-12-19 RX ADMIN — HYDROXYCHLOROQUINE SULFATE 200 MG: 200 TABLET, FILM COATED ORAL at 08:12

## 2020-12-19 RX ADMIN — OFLOXACIN 2 DROP: 3 SOLUTION/ DROPS OPHTHALMIC at 18:32

## 2020-12-19 RX ADMIN — ENOXAPARIN SODIUM 40 MG: 40 INJECTION SUBCUTANEOUS at 18:32

## 2020-12-19 RX ADMIN — PREDNISONE 5 MG: 5 TABLET ORAL at 08:12

## 2020-12-19 RX ADMIN — HYDROXYZINE HYDROCHLORIDE 50 MG: 50 TABLET, FILM COATED ORAL at 15:51

## 2020-12-19 RX ADMIN — OFLOXACIN 2 DROP: 3 SOLUTION/ DROPS OPHTHALMIC at 08:19

## 2020-12-19 ASSESSMENT — ACTIVITIES OF DAILY LIVING (ADL)
ADLS_ACUITY_SCORE: 16
ADLS_ACUITY_SCORE: 17

## 2020-12-19 ASSESSMENT — MIFFLIN-ST. JEOR: SCORE: 1290.99

## 2020-12-19 NOTE — PLAN OF CARE
To Do:  End of Shift Summary  For vital signs and complete assessments, please see documentation flowsheets.     Pertinent assessments: AO. VSS. Afebrile. LS fine crackles. BS hypoactive. Pt reports back pain, received Narco.  BLE edema, left greater than right. encouraging elevation. Regular diet, good appetite. Neuro checks per orders, unchanged. Tele SR.    Major Shift Events: 1200 mL fluid restriction, Na 123, straight  cathed for  400ml    Treatment Plan: Neuro Checks every 4 hours, Rocephin. PVR, straight cath as needed.    Bedside Nurse: Yocasta Kahn RN

## 2020-12-19 NOTE — PROGRESS NOTES
12/19/20 1000   Quick Adds   Type of Visit Initial PT Evaluation   Living Environment   People in home child(onelia), adult;grandchild(onelia);spouse   Current Living Arrangements house   Home Accessibility stairs to enter home   Number of Stairs, Main Entrance 2   Stair Railings, Main Entrance none   Transportation Anticipated family or friend will provide   Self-Care   Usual Activity Tolerance moderate   Current Activity Tolerance moderate   Regular Exercise No   Equipment Currently Used at Home walker, rolling   Disability/Function   Walking or Climbing Stairs Difficulty yes   Walking or Climbing Stairs ambulation difficulty, requires equipment   Mobility Management Pt uses a walker with all mobility   Fall history within last six months yes   Number of times patient has fallen within last six months 1   Change in Functional Status Since Onset of Current Illness/Injury yes   General Information   Onset of Illness/Injury or Date of Surgery 12/17/20   Referring Physician Delmer Acharya MD   Patient/Family Therapy Goals Statement (PT) Discharge to home   Pertinent History of Current Problem (include personal factors and/or comorbidities that impact the POC) Nicolás Wyman is a 80 year old female with PMH including hypertension, pulmonary fibrosis, non-Hodgkin's lymphoma, RA with prior history of long-term steroid use and osteoporosis who presents with low back pain in the setting of a recently diagnosed compression fracture found to have worsening hyponatremia for which she was referred to the emergency room from clinic.   Existing Precautions/Restrictions fall   Cognition   Orientation Status (Cognition) oriented x 4   Affect/Mental Status (Cognition) WFL   Follows Commands (Cognition) WFL   Pain Assessment   Patient Currently in Pain Yes, see Vital Sign flowsheet  (bladder fullness, RN udpated)   Integumentary/Edema   Integumentary/Edema Comments Multiple bruises noted from pt's fall prior to  admission   Posture    Posture Forward head position;Protracted shoulders   Range of Motion (ROM)   ROM Comment WFL   Strength   Manual Muscle Testing Quick Adds Deficits observed during functional mobility   Bed Mobility   Comment (Bed Mobility) sit>supine with Amaya at B LE   Transfers   Transfer Safety Comments sit<>stand with CGA   Gait/Stairs (Locomotion)   Comment (Gait/Stairs) CGA with FWW   Balance   Balance Comments Requires B UE support for safe dynamic mobility   Sensory Examination   Sensory Perception patient reports no sensory changes   Coordination   Coordination no deficits were identified   Muscle Tone   Muscle Tone no deficits were identified   Clinical Impression   Criteria for Skilled Therapeutic Intervention yes, treatment indicated   PT Diagnosis (PT) Impaired functional mobility   Influenced by the following impairments Pain, weakness, deconditioning   Functional limitations due to impairments Difficulty with bed mobility, transfers, ambulation, stairs   Clinical Presentation Stable/Uncomplicated   Clinical Presentation Rationale progressing medically, good support at home, clear POC   Clinical Decision Making (Complexity) low complexity   Therapy Frequency (PT) 5x/week   Predicted Duration of Therapy Intervention (days/wks) 5 days   Planned Therapy Interventions (PT) balance training;bed mobility training;gait training;home exercise program;patient/family education;stair training;strengthening;stretching;transfer training   Risk & Benefits of therapy have been explained evaluation/treatment results reviewed;care plan/treatment goals reviewed;risks/benefits reviewed;current/potential barriers reviewed;participants voiced agreement with care plan;participants included;patient   PT Discharge Planning    PT Discharge Recommendation (DC Rec) home with assist   PT Rationale for DC Rec Anticipate that with continued medical management, IP PT, and walking program with nursing the pt will be able to  demonstrate ability to complete all mobility skills required for safe discharge. May require assist for stair management.    PT Brief overview of current status  AX1 WW, needs to ambulate 3x/day   Total Evaluation Time   Total Evaluation Time (Minutes) 5

## 2020-12-19 NOTE — PROVIDER NOTIFICATION
MD notified: [id number]:  Pt has been straight cathed x3. Do you want a niño placed? Thanks!    MD responded [phone]: Will leave for day rounding Hospitalist to determine if indwelling Niño catheter placement is appropriate.

## 2020-12-19 NOTE — PROGRESS NOTES
VSS. Pt alert and oriented x 4. Up assist of 1 with walker, gait belt. Voided 200, PVR for 640. Straight catheterized for 500 ml- tolerated well. PIV SL in between abx. Fluid restriction. Discharge pending.

## 2020-12-19 NOTE — PROGRESS NOTES
[facility]  Hospitalist Progress Note  Admit 12/17/2020  2:07 PM    Name: Nicolás Wyman    MRN: [id number]  Provider:  Jordan Whiting MD, [facility]    Date of Service: 12/19/2020     Reason for Stay (Diagnosis): Severe hyponatremia         Summary of hospital stay & Assessment/Plan:   Summary of Stay: Nicolás Wyman is a 80 year old female who was admitted on 12/17/2020  80 year old female with PMH including hypertension, pulmonary fibrosis, non-Hodgkin's lymphoma, RA with prior history of long-term steroid use and osteoporosis who presents with low back pain in the setting of a recently diagnosed compression fracture found to have worsening hyponatremia for which she was referred to the emergency room from clinic.     Subacute severe hyponatremia: Sodium is 117 on admission.  Suspected to be chronic to subacute in nature. FeNA 0.4. Urine sodium 41. Given 500 ml normal slaine bolus in ED.   -Nephrology consulted appreciate assistance  -Continue sodium checks  --Goal correction is approximately 4-6 mEq/day      Possible urinary tract infection  -UA showed few bacteria, 24 WBC, large leukocyte esterase.  Empirically started on ceftriaxone.  Urine culture shows Proteus mirabilis ,000 colonies. Short course is reasonable     History of hypertension:  PTA losartan 100 mg daily, amlodipine 7.5 mg daily, losartan 100 mg daily     Urinary retention: Was seen on 12/15 in the ER for this issue.  CT scan did not show any evidence of hydronephrosis and she was able to void and was discharged home.  Suspect this may be related to narcotic use.  Reviewing her records she has had episodic urinary retention since 2011 at least documented almost always related to pain medications  --Monitor with bladder scans, as needed straight cath.  Might need to consider Crocker catheter on discharge and referral to urology if retention persists despite discontinuing Norco     History of rheumatoid arthritis:  PTA Plaquenil and  prednisone 5 mg.       History of pulmonary fibrosis: Not currently hypoxic.  No apparent acute issues.     Low back pain due to L3 compression fracture: Diagnosed about 2 weeks ago. Continue Tylenol, bowel regimen and as needed Norco (side effect constipation and urinary retention ) stopped try low dose tramadol .     Constipation: No bowel movement in about 7 days.  Suspect related to narcotic pain medications used to treat her compression fracture.  --Schedule senna-doc, MiraLAX twice daily, and suppository   --Monitor for response, might need to consider an enema in the morning.     Conjunctivitis possibly bacterial:  Getting IV ceftriaxone for possible UTI as above.  Will provide some ofloxacin drops for now.       Morbid Obesity BMI 40: Complicates cares     Activity: PT  DVT Prophylaxis: Enoxaparin (Lovenox) subcutaneous      Code Status:  Full Code    Disposition Plan     Expected discharge in 1 days to prior living arrangement once above symptoms improve and sodium continues to increase       Entered: Jordan Billingsleysudheer 12/19/2020, 11:31 AM                 Interval History:       Denies any new complaints, confirmed urinary retention for years when she is sick, never seen a urologist per her report and I could not find records.    Today's plan detailed above discussed with nursing               Physical Exam:   Physical Exam   Temp: 98.5  F (36.9  C) Temp src: Axillary BP: (!) 157/62 Pulse: 78   Resp: 20 SpO2: 96 % O2 Device: None (Room air)    Vitals:    12/1940 12/18/20 0607 12/19/20 0450   Weight: 93.8 kg (206 lb 12.8 oz) 91.7 kg (202 lb 1.6 oz) 91.5 kg (201 lb 12.8 oz)     I/O last 3 completed shifts:  In: 793 [P.O.:793]  Out: 2320 [Urine:2320]      Constitutional: Awake, alert, cooperative, no apparent distress. Non-toxic. Appears stated age.   HEENT: Atraumatic. Normocephalic. Conjunctiva non-injected. Sclera anicteric. MMM.   Respiratory: Moves air bilaterally. Clear to auscultation  bilaterally, no crackles or wheezing  Cardiovascular: Regular rate and rhythm, normal S1 and S2, and no murmur noted  GI: Normal bowel sounds, soft, non-distended, non-tender  Skin/Integumen: No rashes, no cyanosis, no edema. Left lower leg with significant ecchymosis.        Medications       amLODIPine  7.5 mg Oral Daily     aspirin  81 mg Oral Daily     atenolol  100 mg Oral Daily     bisacodyl  10 mg Rectal Daily     artificial tears ophthalmic solution  1 drop Both Eyes TID     cefTRIAXone  1 g Intravenous Q24H     enoxaparin ANTICOAGULANT  40 mg Subcutaneous Q24H     hydroxychloroquine  200 mg Oral BID     losartan  100 mg Oral Daily     ofloxacin  2 drop Both Eyes 4x Daily     polyethylene glycol  17 g Oral BID     predniSONE  5 mg Oral Daily     senna-docusate  1 tablet Oral At Bedtime     sodium chloride (PF)  3 mL Intracatheter Q8H     Data     -Data reviewed today:  I personally reviewed  all new labs and imaging results over the last 24 hours.    Recent Labs   Lab 12/17/20  1456 12/15/20  1232   WBC 9.3 10.0   HGB 12.4 13.6   HCT 36.7 40.2   MCV 91 92    303     Recent Labs   Lab 12/19/20  0802 12/19/20  0004 12/18/20  1745 12/18/20  0645 12/18/20  0645 12/17/20 2004 12/17/20 2004 12/17/20  1456   * 123* 119*   < > 120*   < > 118* 117*   POTASSIUM 3.9  --   --   --  4.1  --  3.9 4.4   CHLORIDE 89*  --   --   --  87*  --   --  83*   CO2 30  --   --   --  28  --   --  30   ANIONGAP 4  --   --   --  5  --   --  4   *  --   --   --  92  --   --  88   BUN 7  --   --   --  11  --   --  12   CR 0.39*  --   --   --  0.49*  --   --  0.47*   GFRESTIMATED >90  --   --   --  >90  --   --  >90   GFRESTBLACK >90  --   --   --  >90  --   --  >90   KRISTOPHER 8.2*  --   --   --  8.4*  --   --  8.5    < > = values in this interval not displayed.       No results found for this or any previous visit (from the past 24 hour(s)).    This document was produced using voice recognition software

## 2020-12-19 NOTE — PROGRESS NOTES
DATE:  12/18/2020   TIME OF RECEIPT FROM LAB:  1854   LAB TEST:  Sodium   LAB VALUE:  119  RESULTS GIVEN WITH READ-BACK TO (PROVIDER):  hospitalist  TIME LAB VALUE REPORTED TO PROVIDER:   1955

## 2020-12-19 NOTE — PROGRESS NOTES
Renal Medicine Progress Note                                Nicolás Wyman MRN# [id number]   Age: 80 year old YOB: 1940   Date of Admission: 12/17/2020 Hospital LOS: 2                  Assessment/Plan:     Nicolás Wyman is a 80 year old female who was admitted on 12/17/2020.      1) Baseline mild hyponatremia:  Na has been ~130 since 2011.  Perhaps a reset osmostat.     2) Severe Hyponatremia:  Duration unclear.  U na 41 so suspect this is not depletional hyponatremia.  U osm is in process, but suspicious for SIADH.  Normal thyroid function.  On chronic prednisone so doubt hypoadrenal.  No medications that lead to SIADH.  It may be that the pulmonary fibrosis + pain from vertebral compression fractures is enough to lead to ADH release.       3) Painful Vertebral Compression Fractures.     4) Pulmonary Fibrosis.      Improved with fluid restriction   Uosm suggests ADH effect    Na level ordered  Continue fluid restriction         Interval History:     Up in room   Comfortable  Follows  IO reviewed    Na up to 123     ROS:     GENERAL: NAD, No fever,chills  R: NEGATIVE for significant cough or SOB  GI: NEGATIVE for abdominal pain, heartburn, nausea, vomiting or change in bowel pattern  EXT: no change edema  ROS otherwise negative    Medications and Allergies:     Reviewed    Physical Exam:     Vitals were reviewed  Patient Vitals for the past 8 hrs:   BP Temp Temp src Pulse Resp SpO2   12/19/20 1238 124/47 97.6  F (36.4  C) Oral 63 20 94 %   12/19/20 0737 (!) 157/62 98.5  F (36.9  C) Axillary 78 20 96 %     I/O last 3 completed shifts:  In: 793 [P.O.:793]  Out: 2320 [Urine:2320]    Vitals:    12/1940 12/18/20 0607 12/19/20 0450   Weight: 93.8 kg (206 lb 12.8 oz) 91.7 kg (202 lb 1.6 oz) 91.5 kg (201 lb 12.8 oz)         GENERAL: awake, alert, follows  HEENT: NC/AT, PERRLA, EOMI, non icteric, pharynx moist without lesion  RESP:  clear anteriorly  CV: RRR, normal S1 S2  NEURO: speech normal and  cranial nerves 2-12 intact  PSYCH: affect normal/bright    Data:     Recent Labs   Lab 12/19/20  0802 12/19/20  0004 12/18/20  1745 12/18/20  1144 12/18/20  0645 12/17/20 2004 12/17/20 2004 12/17/20  1456 12/16/20  1445   * 123* 119* 119* 120*   < > 118* 117* 116*   POTASSIUM 3.9  --   --   --  4.1  --  3.9 4.4 4.5   CHLORIDE 89*  --   --   --  87*  --   --  83* 82*   CO2 30  --   --   --  28  --   --  30 26   ANIONGAP 4  --   --   --  5  --   --  4 8   *  --   --   --  92  --   --  88 104*   BUN 7  --   --   --  11  --   --  12 9   CR 0.39*  --   --   --  0.49*  --   --  0.47* 0.44*   GFRESTIMATED >90  --   --   --  >90  --   --  >90 >90   GFRESTBLACK >90  --   --   --  >90  --   --  >90 >90   KRISTOPHER 8.2*  --   --   --  8.4*  --   --  8.5 8.3*    < > = values in this interval not displayed.         Recent Labs   Lab Test 12/19/20  0802 12/18/20  0645 12/17/20 1456 12/16/20  1445 12/15/20  1232 11/15/18  0853 12/09/14 07/21/14  1117 07/14/14  1130 06/05/14  0849   CR 0.39* 0.49* 0.47* 0.44* 0.50* 0.65 0.6 0.52 0.54 0.63     Recent Labs   Lab Test 12/19/20  0802 12/19/20  0004 12/18/20  1745 12/18/20  1144 12/18/20  0645 12/17/20  2353 12/17/20 2004 12/17/20 1456 12/16/20  1445 12/15/20  1232   * 123* 119* 119* 120* 121* 118* 117* 116* 121*     Recent Labs   Lab 12/17/20  1634   COLOR Light Yellow   APPEARANCE Slightly Cloudy   URINEGLC Negative   URINEBILI Negative   URINEKETONE Negative   SG 1.011   UBLD Negative   URINEPH 6.5   PROTEIN 20*   NITRITE Negative   LEUKEST Large*   RBCU 3*   WBCU 24*     Recent Labs   Lab 12/18/20  1252 12/18/20  0645 12/17/20  1634   UROSMOLALITY 345  --   --    UNAR  --   --  41   TSH  --  5.01*  --          DIEGO Trejo MD    [facility] - Nephrology  [phone]

## 2020-12-19 NOTE — CONSULTS
Care Management Initial Consult    General Information  Assessment completed with: Patient, Gilmar -daughter   Type of CM/SW Visit: Initial Assessment    Primary Care Provider verified and updated as needed:     Readmission within the last 30 days:        Reason for Consult: discharge planning  Advance Care Planning:        HCD in [facility]     Communication Assessment  Patient's communication style: spoken language (English or Bilingual)    Hearing Difficulty or Deaf: no   Wear Glasses or Blind: yes    Cognitive  Cognitive/Neuro/Behavioral: WDL                      Living Environment:   People in home: child(onelai), adult  gilmar  Current living Arrangements: house    - 2 stairs to enter the home   Able to return to prior arrangements: yes  Living Arrangement Comments: has support from family     Family/Social Support:  Care provided by: self, child(onelia)  Provides care for: no one  Marital Status:   Children          Description of Support System: Supportive, Involved    Support Assessment: Adequate family and caregiver support, Adequate social supports    Current Resources:   Skilled Home Care Services: Skilled Nursing, Physicial Therapy  Community Resources: Home Care  Referrals was sent to [facility] from clinic. Was not opened to services due to admission   Equipment currently used at home: walker, standard  Supplies currently used at home:      Employment/Financial:  Employment Status: retired        Financial Concerns: No concerns identified           Lifestyle & Psychosocial Needs:        Socioeconomic History     Marital status:      Spouse name: Not on file     Number of children: 4     Years of education: Not on file     Highest education level: Not on file     Tobacco Use     Smoking status: Never Smoker     Smokeless tobacco: Never Used   Substance and Sexual Activity     Alcohol use: No     Drug use: No     Sexual activity: Never       Functional Status:  Prior to admission patient needed assistance:        Prior to admission pt was needing more support due to fatigue. Had fall in the past  PT stated she is able to manage her own mediations  Daughter assist with meals, cleaning, transport        Mental Health Status:  Mental Health Status: No Current Concerns       Chemical Dependency Status:  N/a               Values/Beliefs:  Spiritual, Cultural Beliefs, Buddhist Practices, Values that affect care:             NO     Additional Information:  Met with pt to confirm support at home. SW notice that Dianne was not on FC but listed on HCD and 2nd medical decision maker. Pt ok with adding her to FC  Pt feels comfortable with the plan to return home with family. Pt is [facility] and requested home care call her daughter Yady to schedule appt upon d.c home    Will need new home care order when Medically ready to leave  Sasha Conteh for transport        Corinne C. White, LSW

## 2020-12-19 NOTE — PLAN OF CARE
End of Shift Summary  For vital signs and complete assessments, please see documentation flowsheets.     Pertinent assessments: AO. VSS. Afebrile. LS fine crackles. BS hypoactive. Pt reports back pain, received Narco. Patient anxious, atarax given. BLE edema, left greater than right. encouraging elevation. Regular diet, good appetite. Neuro checks per orders, unchanged. Tele SR.    Major Shift Events: 1,500 mL fluid restriction added. Atarax given for anxiety. Dulcolax suppository given. Lovenox started.    Treatment Plan: Neuro Checks every 4 hours, Rocephin. PVR, straight cath as needed.

## 2020-12-20 LAB
ANION GAP SERPL CALCULATED.3IONS-SCNC: 2 MMOL/L (ref 3–14)
BUN SERPL-MCNC: 5 MG/DL (ref 7–30)
CALCIUM SERPL-MCNC: 8.5 MG/DL (ref 8.5–10.1)
CHLORIDE SERPL-SCNC: 91 MMOL/L (ref 94–109)
CO2 SERPL-SCNC: 33 MMOL/L (ref 20–32)
CREAT SERPL-MCNC: 0.44 MG/DL (ref 0.52–1.04)
ERYTHROCYTE [DISTWIDTH] IN BLOOD BY AUTOMATED COUNT: 14 % (ref 10–15)
GFR SERPL CREATININE-BSD FRML MDRD: >90 ML/MIN/{1.73_M2}
GLUCOSE SERPL-MCNC: 97 MG/DL (ref 70–99)
HCT VFR BLD AUTO: 38.4 % (ref 35–47)
HGB BLD-MCNC: 12.8 G/DL (ref 11.7–15.7)
MCH RBC QN AUTO: 31 PG (ref 26.5–33)
MCHC RBC AUTO-ENTMCNC: 33.3 G/DL (ref 31.5–36.5)
MCV RBC AUTO: 93 FL (ref 78–100)
PLATELET # BLD AUTO: 311 10E9/L (ref 150–450)
POTASSIUM SERPL-SCNC: 3.9 MMOL/L (ref 3.4–5.3)
RBC # BLD AUTO: 4.13 10E12/L (ref 3.8–5.2)
SODIUM SERPL-SCNC: 126 MMOL/L (ref 133–144)
WBC # BLD AUTO: 7.3 10E9/L (ref 4–11)

## 2020-12-20 PROCEDURE — 36415 COLL VENOUS BLD VENIPUNCTURE: CPT | Performed by: INTERNAL MEDICINE

## 2020-12-20 PROCEDURE — 99232 SBSQ HOSP IP/OBS MODERATE 35: CPT | Performed by: INTERNAL MEDICINE

## 2020-12-20 PROCEDURE — 250N000013 HC RX MED GY IP 250 OP 250 PS 637: Performed by: INTERNAL MEDICINE

## 2020-12-20 PROCEDURE — 250N000011 HC RX IP 250 OP 636: Performed by: INTERNAL MEDICINE

## 2020-12-20 PROCEDURE — 94640 AIRWAY INHALATION TREATMENT: CPT

## 2020-12-20 PROCEDURE — 250N000012 HC RX MED GY IP 250 OP 636 PS 637: Performed by: INTERNAL MEDICINE

## 2020-12-20 PROCEDURE — 80048 BASIC METABOLIC PNL TOTAL CA: CPT | Performed by: INTERNAL MEDICINE

## 2020-12-20 PROCEDURE — 120N000001 HC R&B MED SURG/OB

## 2020-12-20 PROCEDURE — 250N000009 HC RX 250: Performed by: INTERNAL MEDICINE

## 2020-12-20 PROCEDURE — 85027 COMPLETE CBC AUTOMATED: CPT | Performed by: INTERNAL MEDICINE

## 2020-12-20 PROCEDURE — 999N000157 HC STATISTIC RCP TIME EA 10 MIN

## 2020-12-20 RX ORDER — AMLODIPINE BESYLATE 10 MG/1
10 TABLET ORAL DAILY
Status: DISCONTINUED | OUTPATIENT
Start: 2020-12-20 | End: 2020-12-21 | Stop reason: HOSPADM

## 2020-12-20 RX ORDER — BISACODYL 10 MG
10 SUPPOSITORY, RECTAL RECTAL DAILY PRN
Status: DISCONTINUED | OUTPATIENT
Start: 2020-12-20 | End: 2020-12-21 | Stop reason: HOSPADM

## 2020-12-20 RX ADMIN — HYDROXYCHLOROQUINE SULFATE 200 MG: 200 TABLET, FILM COATED ORAL at 09:14

## 2020-12-20 RX ADMIN — TRAMADOL HYDROCHLORIDE 25 MG: 50 TABLET, FILM COATED ORAL at 22:35

## 2020-12-20 RX ADMIN — PREDNISONE 5 MG: 5 TABLET ORAL at 09:14

## 2020-12-20 RX ADMIN — ALBUTEROL SULFATE 2.5 MG: 2.5 SOLUTION RESPIRATORY (INHALATION) at 13:22

## 2020-12-20 RX ADMIN — HYDROXYZINE HYDROCHLORIDE 50 MG: 50 TABLET, FILM COATED ORAL at 20:11

## 2020-12-20 RX ADMIN — POLYETHYLENE GLYCOL 3350 17 G: 17 POWDER, FOR SOLUTION ORAL at 20:11

## 2020-12-20 RX ADMIN — OFLOXACIN 2 DROP: 3 SOLUTION/ DROPS OPHTHALMIC at 13:03

## 2020-12-20 RX ADMIN — ENOXAPARIN SODIUM 40 MG: 40 INJECTION SUBCUTANEOUS at 18:04

## 2020-12-20 RX ADMIN — CEFTRIAXONE 1 G: 1 INJECTION, POWDER, FOR SOLUTION INTRAMUSCULAR; INTRAVENOUS at 19:07

## 2020-12-20 RX ADMIN — OFLOXACIN 2 DROP: 3 SOLUTION/ DROPS OPHTHALMIC at 17:06

## 2020-12-20 RX ADMIN — OFLOXACIN 2 DROP: 3 SOLUTION/ DROPS OPHTHALMIC at 09:21

## 2020-12-20 RX ADMIN — HYDROXYZINE HYDROCHLORIDE 50 MG: 50 TABLET, FILM COATED ORAL at 09:15

## 2020-12-20 RX ADMIN — ASPIRIN 81 MG: 81 TABLET ORAL at 09:14

## 2020-12-20 RX ADMIN — TRAMADOL HYDROCHLORIDE 25 MG: 50 TABLET, FILM COATED ORAL at 15:48

## 2020-12-20 RX ADMIN — TRAMADOL HYDROCHLORIDE 25 MG: 50 TABLET, FILM COATED ORAL at 02:14

## 2020-12-20 RX ADMIN — OFLOXACIN 2 DROP: 3 SOLUTION/ DROPS OPHTHALMIC at 20:17

## 2020-12-20 RX ADMIN — DOCUSATE SODIUM AND SENNOSIDES 1 TABLET: 8.6; 5 TABLET ORAL at 20:23

## 2020-12-20 RX ADMIN — CARBOXYMETHYLCELLULOSE SODIUM 1 DROP: 5 SOLUTION/ DROPS OPHTHALMIC at 09:17

## 2020-12-20 RX ADMIN — HYDROXYCHLOROQUINE SULFATE 200 MG: 200 TABLET, FILM COATED ORAL at 20:11

## 2020-12-20 RX ADMIN — ATENOLOL 100 MG: 25 TABLET ORAL at 09:14

## 2020-12-20 RX ADMIN — CARBOXYMETHYLCELLULOSE SODIUM 1 DROP: 5 SOLUTION/ DROPS OPHTHALMIC at 20:22

## 2020-12-20 RX ADMIN — POLYETHYLENE GLYCOL 3350 17 G: 17 POWDER, FOR SOLUTION ORAL at 09:13

## 2020-12-20 RX ADMIN — LOSARTAN POTASSIUM 100 MG: 100 TABLET, FILM COATED ORAL at 09:14

## 2020-12-20 RX ADMIN — CARBOXYMETHYLCELLULOSE SODIUM 1 DROP: 5 SOLUTION/ DROPS OPHTHALMIC at 15:48

## 2020-12-20 RX ADMIN — AMLODIPINE BESYLATE 10 MG: 10 TABLET ORAL at 09:15

## 2020-12-20 RX ADMIN — TRAMADOL HYDROCHLORIDE 25 MG: 50 TABLET, FILM COATED ORAL at 09:14

## 2020-12-20 ASSESSMENT — MIFFLIN-ST. JEOR: SCORE: 1277.38

## 2020-12-20 ASSESSMENT — ACTIVITIES OF DAILY LIVING (ADL)
ADLS_ACUITY_SCORE: 16

## 2020-12-20 NOTE — PROGRESS NOTES
[facility]  Hospitalist Progress Note  Name: Nicolás Wyman    MRN: [id number]  Physician:  Juan Carlos Cleary DO, FHM (Text Page)    Summary of Stay:  Nicolás Wyman is a 80 year old female who was admitted on 12/17/2020.  PMH includes hypertension, pulmonary fibrosis, non-Hodgkin's lymphoma, RA with prior history of long-term steroid use and osteoporosis who presents with low back pain in the setting of a recently diagnosed compression fracture found to have worsening hyponatremia for which she was referred to the emergency room from clinic.  Since admission she has had a very slow rise in sodium levels.  Appears a degree of SIADH involved.     Assessment & Plan    Subacute severe hyponatremia:   -  Sodium was 117 on admission, slowly since has improved.  [place] to likely be related to SIADH.  Continue fluid restriction.  -Nephrology assistance appreciated  -Recheck sodium tomorrow, given improvement if it continues to be stable/improve possibly could discharge tomorrow.     Possible urinary tract infection  -UA showed few bacteria, 24 WBC, large leukocyte esterase.  Empirically started on ceftriaxone IV.  Urine culture shows Proteus mirabilis ,000 colonies.     Hypertension:  PTA losartan 100 mg daily, amlodipine 7.5 mg daily, losartan 100 mg daily.  BP higher today, I increased amlodipine from 7.5 mg to 10 mg daily.     Urinary retention: Was seen on 12/15 in the ER for this issue.  CT scan did not show any evidence of hydronephrosis and she was able to void and was discharged home.  Suspect this may be related to narcotic use.  Reviewing her records she has had episodic urinary retention since 2011 at least documented almost always related to pain medications  --Ongoing cath needs.  I requested a niño be placed today.  Recommend likely to discharge with it + outpatient urology follow-up.       History of rheumatoid arthritis:  PTA Plaquenil and prednisone 5 mg.       History of  pulmonary fibrosis: Not currently hypoxic.  No apparent acute issues.     Low back pain due to L3 compression fracture: Diagnosed about 2 weeks ago. Continue Tylenol, bowel regimen and as needed Norco (side effect constipation and urinary retention ) stopped try low dose tramadol .     Constipation: No bowel movement in about 7 days prior to recent one.  Suspect related to narcotic pain medications used to treat her compression fracture.    --Schedule senna-doc, MiraLAX twice daily, and suppository . She has now had  a BM.  Continue to monitor.     Conjunctivitis possibly bacterial:  Getting IV ceftriaxone for possible UTI as above.  Also on ofloxacin drops for now.  Eye's improved today, no new complaint.     Morbid Obesity BMI 40: Complicates cares           COVID Status:  COVID-19 PCR Results    COVID-19 PCR Results 12/17/20   Flu A/B & SARS-COV-2 PCR Source Nasopharyngeal   SARS-CoV-2 PCR Result NEGATIVE      Comments are available for some flowsheets but are not being displayed.         COVID-19 Antibody Results, Testing for Immunity    COVID-19 Antibody Results, Testing for Immunity   No data to display.            Diet: Combination Diet Regular Diet Adult  Fluid restriction 1200 ML FLUID    DVT Prophylaxis: Enoxaparin (Lovenox) SQ  Crocker Catheter: in place, indication: Retention  Code Status: Full Code      Disposition Plan    Possible discharge tomorrow or next day if sodium stable/continues to improve.     Entered: Juan Carlos Cleary 12/20/2020, 2:36 PM       Interval History   Assumed care, history reviewed.  She is feeling better but still generally weak/ill.  No new pain, worsening SOB.  Feels better after BM.      -Data reviewed today: I reviewed all new labs and imaging reports over the last 24 hours. I personally reviewed no images or EKG's today.    Physical Exam   Temp: 98  F (36.7  C) Temp src: Oral BP: 107/44 Pulse: 71   Resp: 16 SpO2: 94 % O2 Device: None (Room air)    Vitals:    12/18/20 0607  12/19/20 0450 12/20/20 0429   Weight: 91.7 kg (202 lb 1.6 oz) 91.5 kg (201 lb 12.8 oz) 90.2 kg (198 lb 12.8 oz)     Vital Signs with Ranges  Temp:  [97.4  F (36.3  C)-98.3  F (36.8  C)] 98  F (36.7  C)  Pulse:  [70-86] 71  Resp:  [16-20] 16  BP: (107-174)/(44-90) 107/44  SpO2:  [93 %-97 %] 94 %  I/O last 3 completed shifts:  In: 830 [P.O.:830]  Out: 3200 [Urine:3200]    GEN:  Alert, oriented, appears comfortable, no overt distress.  HEENT:  Normocephalic/atraumatic, no scleral icterus, no nasal discharge, mouth moist.  CV:  Regular rate and rhythm, no rub.  LUNGS:  Clear to auscultation bilaterally without rales/rhonchi/wheezing/retractions.  Mildly decreased breath sounds bases.  Symmetric chest rise on inhalation noted.  ABD:  Active bowel sounds, soft, non-tender/non-distended.  No rebound/guarding/rigidity.  EXT:  No edema.  No cyanosis.  No acute joint synovitis noted.  SKIN:  Dry to touch, no exanthems noted in the visualized areas.    Medications       amLODIPine  10 mg Oral Daily     aspirin  81 mg Oral Daily     atenolol  100 mg Oral Daily     artificial tears ophthalmic solution  1 drop Both Eyes TID     cefTRIAXone  1 g Intravenous Q24H     enoxaparin ANTICOAGULANT  40 mg Subcutaneous Q24H     hydroxychloroquine  200 mg Oral BID     losartan  100 mg Oral Daily     ofloxacin  2 drop Both Eyes 4x Daily     polyethylene glycol  17 g Oral BID     predniSONE  5 mg Oral Daily     senna-docusate  1 tablet Oral At Bedtime     sodium chloride (PF)  3 mL Intracatheter Q8H     Data     Recent Labs   Lab 12/20/20  0803 12/17/20  1456 12/15/20  1232   WBC 7.3 9.3 10.0   HGB 12.8 12.4 13.6   HCT 38.4 36.7 40.2   MCV 93 91 92    302 303     Recent Labs   Lab 12/20/20  0803 12/19/20  1520 12/19/20  0802 12/18/20  0645 12/18/20  0645 12/17/20 2004 12/17/20 2004 12/17/20  1456   * 122* 123*   < > 120*   < > 118* 117*   POTASSIUM 3.9  --  3.9  --  4.1  --  3.9 4.4   CHLORIDE 91*  --  89*  --  87*  --   --   83*   CO2 33*  --  30  --  28  --   --  30   ANIONGAP 2*  --  4  --  5  --   --  4   GLC 97  --  125*  --  92  --   --  88   BUN 5*  --  7  --  11  --   --  12   CR 0.44*  --  0.39*  --  0.49*  --   --  0.47*   GFRESTIMATED >90  --  >90  --  >90  --   --  >90   GFRESTBLACK >90  --  >90  --  >90  --   --  >90   KRISTOPHER 8.5  --  8.2*  --  8.4*  --   --  8.5   MAG  --   --   --   --   --   --  1.9 2.1   PHOS  --   --   --   --   --   --   --  2.8   PROTTOTAL  --   --   --   --   --   --   --  6.5*   ALBUMIN  --   --   --   --   --   --   --  3.1*   BILITOTAL  --   --   --   --   --   --   --  0.9   ALKPHOS  --   --   --   --   --   --   --  69   AST  --   --   --   --   --   --   --  31   ALT  --   --   --   --   --   --   --  33    < > = values in this interval not displayed.       No results found for this or any previous visit (from the past 24 hour(s)).

## 2020-12-20 NOTE — PROGRESS NOTES
End of Shift Summary  For vital signs and complete assessments, please see documentation flowsheets.     Pertinent assessments: Pt A&O, elevated BP, on RA. C/O lower back pain Atarax and Tramadol x 1 this shift. Up frequently to void, PVR's done, straight cath x 2 for 400 and 800. On tele monitoring.     Major Shift Events: Per MD continue to straight cath, no niño.  Treatment Plan: Neuro Checks every 4 hours, Rocephin. PVR, straight cath as needed. Possible niño on discharge and referral to urology if retention persists despite discontinuing Norco.  Bedside Nurse: Ciera Garcia RN

## 2020-12-20 NOTE — PROGRESS NOTES
Care Management Follow Up    Length of Stay (days): 3    Expected Discharge Date: 12/21/20(/Home)     Concerns to be Addressed: all concerns addressed in this encounter     Patient plan of care discussed at interdisciplinary rounds: Yes    Anticipated Discharge Disposition: Home Care     Anticipated Discharge Services:  None  Anticipated Discharge DME:  None. Patient has 4WW, scooter.     Patient/family educated on Medicare website which has current facility and service quality ratings:  No  Education Provided on the Discharge Plan:  Scheduled hospital f/u phone appointment; updated AVS.   Patient/Family in Agreement with the Plan:  Yes     Referrals Placed by CM/SW:  None  Private pay costs discussed: Not applicable    Additional Information:  CM met with patient at bedside. She has URR (unplanned readmission risk) of 23%. She has multiple doctors in many disciples: Ortho - Dr. Geoffrey Rahman; Rheumatologist - Praful Houston; Pulmonology - Pepe Joe; Hematology - Dr. Doni Hancock. Patient stated she follows with Oncology through [facility] but can't remember the doctor's name. She lives independently in a house. Her 2 daughters are very helpful. She has her prescriptions filled at [facility]. Her daughter, Yady, can provide her transport back home. CM scheduled a hospital f/u phone appointment with Dr. oYcasta Hernandez for Tuesday, 12/29/20 at 1pm.     CM will continue to follow patient until discharge for any additional needs.     Sana Busch RN, BSN, CPHN, CM  Inpatient Care Coordination  [facility]  [phone]

## 2020-12-20 NOTE — DISCHARGE INSTRUCTIONS
Your home care referral was sent to [facility]  If you haven't heard from them within the next 24-48 hours,  Please call them at [phone]      A Telephonic hospital follow up appointment has been scheduled for you with Dr. Yocasta Hernandez at [facility] on Tuesday, 12/29/20 at 1pm. You will receive video instructions via your email. A medical assistant will call you prior to your appointment. Please note the doctor will call you between 12:45pm and 1:15pm to speak with you via phone. You do not need to go to the clinic unless directed by your doctor or clinic staff.  Please call the clinic at [phone] if you have questions or need to reschedule.

## 2020-12-20 NOTE — PLAN OF CARE
End of Shift Summary  For vital signs and complete assessments, please see documentation flowsheets.     Pertinent assessments: Pt A&O, elevated BP, improved after meds given. C/O lower back pain Atarax and Tramadol x 1 this shift. On tele monitoring. Ambulating in room SBA with walker.    Major Shift Events: Crocker placed, good output.    Treatment Plan: Neuro Checks every 4 hours, Rocephin.

## 2020-12-20 NOTE — PLAN OF CARE
End of Shift Summary  For vital signs and complete assessments, please see documentation flowsheets.     Pertinent assessments: AO. VSS.  LS dim.  Pt reports back pain, received Ultram and ataraz. BLE edema, encouraging elevation. Regular diet, good appetite. Neuro checks per orders, unchanged. Tele SR. 1200 mL fluid restriction. Straight cath x1.     Major Shift Events: Per MD continue to straight cath, no niño. Norco discontinued.    Treatment Plan: Neuro Checks every 4 hours, Rocephin. PVR, straight cath as needed. Possible niño on discharge and referral to urology if retention persists despite discontinuing Norco.

## 2020-12-20 NOTE — PROGRESS NOTES
Renal Medicine       Continued gradual improvement on current fluid restriction  Probable SIADH    No further intervention necessary at this time  Please call with questions  Signing off         Recent Labs   Lab 12/20/20  0803   *   POTASSIUM 3.9   CHLORIDE 91*   CO2 33*   ANIONGAP 2*   GLC 97   BUN 5*   CR 0.44*   GFRESTIMATED >90   GFRESTBLACK >90   KRISTOPHER 8.5     Recent Labs   Lab Test 12/20/20  0803 12/19/20  1520 12/19/20  0802 12/19/20  0004 12/18/20  1745 12/18/20  1144 12/18/20  0645 12/17/20  2353 12/17/20  2004 12/17/20  1456   * 122* 123* 123* 119* 119* 120* 121* 118* 117*     Recent Labs   Lab 12/18/20  1252 12/18/20  0645 12/17/20  1634   UROSMOLALITY 345  --   --    UNAR  --   --  41   TSH  --  5.01*  --            JOSEY Trejo    [facility]  [phone]

## 2020-12-20 NOTE — PROGRESS NOTES
Notified on ongoing urinary retention with several straight caths past 24 hours.  I have requested a niño cath be placed.

## 2020-12-21 VITALS
WEIGHT: 194.5 LBS | BODY MASS INDEX: 39.21 KG/M2 | HEART RATE: 85 BPM | DIASTOLIC BLOOD PRESSURE: 53 MMHG | SYSTOLIC BLOOD PRESSURE: 134 MMHG | HEIGHT: 59 IN | RESPIRATION RATE: 20 BRPM | TEMPERATURE: 99.1 F | OXYGEN SATURATION: 94 %

## 2020-12-21 LAB
ANION GAP SERPL CALCULATED.3IONS-SCNC: 4 MMOL/L (ref 3–14)
BUN SERPL-MCNC: 8 MG/DL (ref 7–30)
CALCIUM SERPL-MCNC: 8.7 MG/DL (ref 8.5–10.1)
CHLORIDE SERPL-SCNC: 92 MMOL/L (ref 94–109)
CO2 SERPL-SCNC: 30 MMOL/L (ref 20–32)
CREAT SERPL-MCNC: 0.54 MG/DL (ref 0.52–1.04)
GFR SERPL CREATININE-BSD FRML MDRD: 89 ML/MIN/{1.73_M2}
GLUCOSE SERPL-MCNC: 80 MG/DL (ref 70–99)
MAGNESIUM SERPL-MCNC: 2.4 MG/DL (ref 1.6–2.3)
POTASSIUM SERPL-SCNC: 4.3 MMOL/L (ref 3.4–5.3)
SODIUM SERPL-SCNC: 126 MMOL/L (ref 133–144)

## 2020-12-21 PROCEDURE — 250N000013 HC RX MED GY IP 250 OP 250 PS 637: Performed by: INTERNAL MEDICINE

## 2020-12-21 PROCEDURE — 99239 HOSP IP/OBS DSCHRG MGMT >30: CPT | Performed by: INTERNAL MEDICINE

## 2020-12-21 PROCEDURE — 83735 ASSAY OF MAGNESIUM: CPT | Performed by: INTERNAL MEDICINE

## 2020-12-21 PROCEDURE — 36415 COLL VENOUS BLD VENIPUNCTURE: CPT | Performed by: INTERNAL MEDICINE

## 2020-12-21 PROCEDURE — 250N000012 HC RX MED GY IP 250 OP 636 PS 637: Performed by: INTERNAL MEDICINE

## 2020-12-21 PROCEDURE — 80048 BASIC METABOLIC PNL TOTAL CA: CPT | Performed by: INTERNAL MEDICINE

## 2020-12-21 RX ORDER — CEFUROXIME AXETIL 500 MG/1
500 TABLET ORAL 2 TIMES DAILY
Qty: 4 TABLET | Refills: 0 | Status: SHIPPED | OUTPATIENT
Start: 2020-12-21 | End: 2020-12-23

## 2020-12-21 RX ORDER — LIDOCAINE 50 MG/G
1 PATCH TOPICAL EVERY 24 HOURS
Qty: 10 PATCH | Refills: 0 | Status: SHIPPED | OUTPATIENT
Start: 2020-12-21 | End: 2020-12-29

## 2020-12-21 RX ADMIN — CARBOXYMETHYLCELLULOSE SODIUM 1 DROP: 5 SOLUTION/ DROPS OPHTHALMIC at 08:48

## 2020-12-21 RX ADMIN — AMLODIPINE BESYLATE 10 MG: 10 TABLET ORAL at 08:38

## 2020-12-21 RX ADMIN — LOSARTAN POTASSIUM 100 MG: 100 TABLET, FILM COATED ORAL at 08:38

## 2020-12-21 RX ADMIN — TRAMADOL HYDROCHLORIDE 25 MG: 50 TABLET, FILM COATED ORAL at 08:37

## 2020-12-21 RX ADMIN — OFLOXACIN 2 DROP: 3 SOLUTION/ DROPS OPHTHALMIC at 08:50

## 2020-12-21 RX ADMIN — POLYETHYLENE GLYCOL 3350 17 G: 17 POWDER, FOR SOLUTION ORAL at 10:53

## 2020-12-21 RX ADMIN — HYDROXYCHLOROQUINE SULFATE 200 MG: 200 TABLET, FILM COATED ORAL at 08:38

## 2020-12-21 RX ADMIN — PREDNISONE 5 MG: 5 TABLET ORAL at 08:38

## 2020-12-21 RX ADMIN — ASPIRIN 81 MG: 81 TABLET ORAL at 08:38

## 2020-12-21 RX ADMIN — ATENOLOL 100 MG: 25 TABLET ORAL at 08:38

## 2020-12-21 ASSESSMENT — ACTIVITIES OF DAILY LIVING (ADL)
ADLS_ACUITY_SCORE: 17
ADLS_ACUITY_SCORE: 16

## 2020-12-21 ASSESSMENT — MIFFLIN-ST. JEOR: SCORE: 1257.88

## 2020-12-21 NOTE — PLAN OF CARE
Physical Therapy Discharge Summary    Reason for therapy discharge:    Discharged to home with home therapy.    Progress towards therapy goal(s). See goals on Care Plan in [facility] electronic health record for goal details.  Goals partially met.  Barriers to achieving goals:   discharge from facility.    Therapy recommendation(s):    Continued therapy is recommended.  Rationale/Recommendations:  HHPT to maximize safety and indep in the home environment.      Note: Pt not seen by documenting PT on this date. Information obtained from chart review.

## 2020-12-21 NOTE — PROGRESS NOTES
[facility]  Spoke with daughter to discuss plans for HC.  Pt to be discharged home today and has agreed to have [facility] follow with services of SN, PT and OT. Patient care support center processing referral.  Daughter verbalized understanding that initial visit is scheduled for 12/23/2020.  Pt has 24 hour phone number for [facility] for any questions or concerns provided on AVS.

## 2020-12-21 NOTE — PLAN OF CARE
VSS on RA. Pt resting in bed this shift, able to reposition self but requires some assistance with pillow placement. C/O back pain with activity, subsides with rest and able to sleep between cares. PIV SL, tolerating small sips of PO at bedside. Crocker in place with good UO. Skin with numerous bruises which pt reports are due to a previous fall. Bed alarms in use, pt not attempting to exit bed. Alert and oriented, able to make needs known. Continue to monitor.

## 2020-12-21 NOTE — PROGRESS NOTES
Care Management Discharge Note    Discharge Date: 12/22/20(/Home)       Discharge Disposition: Home Care    Discharge Services:  [facility] RN/PT/OT    Discharge DME:  none    Discharge Transportation: family or friend will provide    Education Provided on the Discharge Plan: yes   Persons Notified of Discharge Plans: Pt  Patient/Family in Agreement with the Plan:  yes      Additional Information:  I notified [facility] of discharge planning.  Pt will need a NA draw on Wednesday.  Pt is agreeable.  Family will transport.    Ciera Hong RN BSN   Inpatient Care Coordination  [facility]  [phone]          Deepa Hong, RN

## 2020-12-21 NOTE — DISCHARGE SUMMARY
[facility]  Hospitalist Discharge Summary      Date of Admission:  12/17/2020  Date of Discharge:  12/21/2020  Discharging Provider: Enzo Elmore MD      Discharge Diagnoses         Follow-ups Needed After Discharge   Follow-up Appointments     Follow-up and recommended labs and tests       Follow up with primary care provider, Yocasta Hernandez, within 3 days for   hospital follow- up.  The following labs/tests are recommended: bmp.             Unresulted Labs Ordered in the Past 30 Days of this Admission     No orders found from 11/17/2020 to 12/18/2020.          Discharge Disposition   Discharged to home  Condition at discharge: Stable      Hospital Course   Subacute severe hyponatremia:   -  Sodium was 117 on admission, slowly since has improved.  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  Continue fluid restriction.  -Nephrology assistance appreciated  -Recheck sodium tomorrow, given improvement if it continues to be stable/improve possibly could discharge tomorrow.     Possible urinary tract infection  -UA showed few bacteria, 24 WBC, large leukocyte esterase.  Empirically started on ceftriaxone IV.  Urine culture shows Proteus mirabilis ,000 colonies.     Hypertension:  PTA losartan 100 mg daily, amlodipine 7.5 mg daily, losartan 100 mg daily.  BP higher today, I increased amlodipine from 7.5 mg to 10 mg daily.     Urinary retention: Was seen on 12/15 in the ER for this issue.  CT scan did not show any evidence of hydronephrosis and she was able to void and was discharged home.  Suspect this may be related to narcotic use.  Reviewing her records she has had episodic urinary retention since 2011 at least documented almost always related to pain medications  --Ongoing cath needs.  I requested a niño be placed today.  Recommend likely to discharge with it + outpatient urology follow-up.       History of rheumatoid arthritis:  PTA Plaquenil and prednisone 5 mg.       History of pulmonary  fibrosis: Not currently hypoxic.  No apparent acute issues.     Low back pain due to L3 compression fracture: Diagnosed about 2 weeks ago. Continue Tylenol, bowel regimen and as needed Norco (side effect constipation and urinary retention ) stopped try low dose tramadol .     Constipation: No bowel movement in about 7 days prior to recent one.  Suspect related to narcotic pain medications used to treat her compression fracture.    --Schedule senna-doc, MiraLAX twice daily, and suppository . She has now had  a BM.  Continue to monitor.     Conjunctivitis possibly bacterial:  Getting IV ceftriaxone for possible UTI as above.  Also on ofloxacin drops for now.  Eye's improved today, no new complaint.     Morbid Obesity BMI 40: Complicates cares    Admitted as inpatient. Afebrile and not hypoxic. Sodium improving. Will remove niño, give voiding trial and get home therapies. Sodium recheck on Wednesday 12/23.        Consultations This Hospital Stay   PHYSICAL THERAPY ADULT IP CONSULT  NEPHROLOGY IP CONSULT  CARE MANAGEMENT / SOCIAL WORK IP CONSULT    Code Status   Full Code    Time Spent on this Encounter   I, Enzo Elmore MD, personally saw the patient today and spent greater than 30 minutes discharging this patient.       Enzo Elmore MD  [place] [address] MEDICAL SURGICAL  [address] [facility] [address]  Phone: [phone]  Fax: [phone]  ______________________________________________________________________    Physical Exam   Vital Signs: Temp: 99.1  F (37.3  C) Temp src: Oral BP: 134/53 Pulse: 85   Resp: 20 SpO2: 94 % O2 Device: None (Room air)    Weight: 194 lbs 8 oz    Gen - AAO x 3 in NAD.  Lungs - CTA B anteriorly.  Heart - RR,S1+S2 nml, no m/g/r.  Abd - soft, NT, ND, + BS.  Ext - trace edema.       Primary Care Physician   Yocasta Hernandez    Discharge Orders      Home Care PT Referral for Hospital Discharge      Home Care OT Referral for Hospital Discharge      Home care  nursing referral      Follow-up and recommended labs and tests     Follow up with primary care provider, Yocasta Hernandez, within 3 days for hospital follow- up.  The following labs/tests are recommended: bmp.     Activity    Your activity upon discharge: activity as tolerated     MD face to face encounter    Documentation of Face to Face and Certification for Home Health Services    I certify that patient: Nicolás Wyman is under my care and that I, or a nurse practitioner or physician's assistant working with me, had a face-to-face encounter that meets the physician face-to-face encounter requirements with this patient on: 12/21/2020.    This encounter with the patient was in whole, or in part, for the following medical condition, which is the primary reason for home health care: hyponatremia.    I certify that, based on my findings, the following services are medically necessary home health services: Nursing, Occupational Therapy and Physical Therapy.    My clinical findings support the need for the above services because: Nurse is needed: To assess activity after changes in medications or other medical regimen.., Occupational Therapy Services are needed to assess and treat cognitive ability and address ADL safety due to impairment in gait. and Physical Therapy Services are needed to assess and treat the following functional impairments: gait.    Further, I certify that my clinical findings support that this patient is homebound (i.e. absences from home require considerable and taxing effort and are for medical reasons or [facility] services or infrequently or of short duration when for other reasons) because: Requires assistance of another person or specialized equipment to access medical services because patient: Has prohibitive pain during ambulation...    Based on the above findings. I certify that this patient is confined to the home and needs intermittent skilled nursing care, physical therapy and/or speech  therapy.  The patient is under my care, and I have initiated the establishment of the plan of care.  This patient will be followed by a physician who will periodically review the plan of care.  Physician/Provider to provide follow up care: Yocasta Hernandez    Attending hospital physician (the Medicare certified [place] provider): Enzo Elmore MD  Physician Signature: See electronic signature associated with these discharge orders.  Date: 12/21/2020     Full Code     Diet    Follow this diet upon discharge: Orders Placed This Encounter      Fluid restriction 1200 ML FLUID      Combination Diet Regular Diet Adult       Significant Results and Procedures   Results for orders placed or performed in visit on 12/16/20   XR Lumbar Spine 2/3 Views    Narrative    LUMBAR SPINE TWO-THREE  VIEWS  12/16/2020 2:45 PM     HISTORY: Acute midline low back pain without sciatica    COMPARISON: Abdominal CT dated 12/15/2020    FINDINGS: There is a compression fracture of the superior endplate of  L3. It is difficult to determine the age of this fracture. This  fracture was present on the abdominal CT scan of 12/15/2020 and has  not changed..  There is moderate, 40% loss of mid vertebral body  height. There are degenerative changes present with loss of disc space  height at L1-L2, L2-L3, L4-L5, and L5-S1. Degenerative change in the  facet joints.      Impression    IMPRESSION:   1. Age-indeterminate compression fracture of the superior endplate of  L3  2. Multilevel degenerative changes.    MIROSLAVA ARCE MD       Discharge Medications   Current Discharge Medication List      START taking these medications    Details   cefuroxime (CEFTIN) 500 MG tablet Take 1 tablet (500 mg) by mouth 2 times daily for 2 days  Qty: 4 tablet, Refills: 0    Associated Diagnoses: Urinary tract infection without hematuria, site unspecified      lidocaine (LIDODERM) 5 % patch Place 1 patch onto the skin every 24 hours for 10 days To prevent lidocaine toxicity,  patient should be patch free for 12 hrs daily.  Qty: 10 patch, Refills: 0    Associated Diagnoses: Bilateral low back pain without sciatica, unspecified chronicity         CONTINUE these medications which have NOT CHANGED    Details   acetylcysteine (N-ACETYL CYSTEINE) 600 MG CAPS capsule Take 1 capsule (600 mg) by mouth daily      albuterol (2.5 MG/3ML) 0.083% nebulizer solution Take 1 vial (2.5 mg) by nebulization every 6 hours as needed  Qty: 100 mL, Refills: 5    Associated Diagnoses: Bronchitis with bronchospasm      albuterol (PROAIR HFA, PROVENTIL HFA, VENTOLIN HFA) 108 (90 BASE) MCG/ACT inhaler Inhale 2 puffs into the lungs every 6 hours as needed for shortness of breath / dyspnea  Qty: 1 Inhaler, Refills: 5    Associated Diagnoses: Cough      amLODIPine (NORVASC) 5 MG tablet TAKE 1 & 1/2 (ONE & ONE-HALF) TABLETS BY MOUTH ONCE DAILY  Qty: 135 tablet, Refills: 0    Associated Diagnoses: Essential hypertension, benign      aspirin 81 MG tablet Take 1 tablet (81 mg) by mouth daily  Qty: 30 tablet      atenolol (TENORMIN) 100 MG tablet Take 1 tablet (100 mg) by mouth daily  Qty: 90 tablet, Refills: 3    Associated Diagnoses: Essential hypertension, benign      cholecalciferol (VITAMIN D3) 1000 UNIT tablet Take 1 tablet (1,000 Units) by mouth daily  Qty: 100 tablet, Refills: 3      doxycycline (VIBRAMYCIN) 100 MG capsule Take 1 capsule by mouth 2 times daily.  Qty: 180 capsule, Refills: 1    Associated Diagnoses: Septic arthritis (H)      ferrous sulfate (IRON) 325 (65 Fe) MG tablet Take 1 tablet (325 mg) by mouth daily (with breakfast)  Qty: 30 tablet, Refills: 2      Glucosamine 500 MG CAPS Take 1 capful by mouth daily.      hydroxychloroquine (PLAQUENIL) 200 MG tablet Take 1 tablet by mouth 2 times daily.  Qty: 60 tablet, Refills: 1      losartan (COZAAR) 100 MG tablet Take 1 tablet (100 mg) by mouth daily  Qty: 90 tablet, Refills: 3    Associated Diagnoses: Essential hypertension, benign      predniSONE  (DELTASONE) 5 MG tablet Take 5 mg by mouth daily       traMADol (ULTRAM) 50 MG tablet Take 50 mg by mouth 3 times daily      IBANdronate (BONIVA) 150 MG tablet Take 1 tablet (150 mg) by mouth every 30 days Take 60 minutes before am meal with 8 oz. water. Remain upright for 60 minutes.  Qty: 3 tablet, Refills: 3    Associated Diagnoses: Osteopenia, unspecified location      levOCARNitine (CARNITOR) 330 MG tablet Take 990 mg by mouth 2 times daily            Allergies   Allergies   Allergen Reactions     Ace Inhibitors Cough     Latex GI Disturbance     lip swelling     Liquid Adhesive Rash     Tape [Adhesive Tape] Rash

## 2020-12-21 NOTE — PLAN OF CARE
End of Shift Summary  For vital signs and complete assessments, please see documentation flowsheets.     Pertinent assessments: Pt A&O, VSS. C/O lower back pain Atarax and Tramadol for pain. On tele. Ambulating in room SBA with walker. Crocker, good output. 1,200 mL fluid restriction.    Major Shift Events: Showered.    Treatment Plan: Neuro Checks every 4 hours, Rocephin.

## 2020-12-22 ENCOUNTER — PATIENT OUTREACH (OUTPATIENT)
Dept: CARE COORDINATION | Facility: CLINIC | Age: 80
End: 2020-12-22

## 2020-12-22 DIAGNOSIS — N39.0 URINARY TRACT INFECTION: Primary | ICD-10-CM

## 2020-12-22 NOTE — LETTER
[place] CARE COORDINATION  [address] [facility] [address]  [facility] [address]    December 23, 2020    Nicolás Wyman  [facility] [address] [place] [address]      Dear Nicolás,    I am a clinic community health worker who works with Yocasta Hernandez MD at [facility]. I wanted to thank you for spending the time to talk with me.  Below is a description of clinic care coordination and how I can further assist you.      The clinic care coordination team is made up of a registered nurse,  and community health worker who understand the health care system. The goal of clinic care coordination is to help you manage your health and improve access to the health care system in the most efficient manner. The team can assist you in meeting your health care goals by providing education, coordinating services, strengthening the communication among your providers and supporting you with any resource needs.    Please feel free to contact the Community Health Worker at [phone] with any questions or concerns. We are focused on providing you with the highest-quality healthcare experience possible and that all starts with you.     Sincerely,     Kerri

## 2020-12-22 NOTE — PROGRESS NOTES
Clinic Care Coordination Contact  [facility]/Voicemail       Clinical Data: Care Coordinator Outreach  Outreach attempted x 1.  Left message on patient's voicemail with call back information and requested return call.  Plan: are Coordinator will try to reach patient again in 1-2 business days.

## 2020-12-23 DIAGNOSIS — Z71.89 OTHER SPECIFIED COUNSELING: ICD-10-CM

## 2020-12-23 NOTE — PROGRESS NOTES
Clinic Care Coordination Contact  Community Health Worker Initial Outreach        Patient accepts CC: No, Enrolled in home care. Patient will be sent Care Coordination introduction letter for future reference. Yady stated that right now, they have no needs, but that could change. She agreed to receive the introduction letter.

## 2020-12-24 LAB
ANION GAP SERPL CALCULATED.3IONS-SCNC: 4 MMOL/L (ref 3–14)
BUN SERPL-MCNC: 11 MG/DL (ref 7–30)
CALCIUM SERPL-MCNC: 8.7 MG/DL (ref 8.5–10.1)
CHLORIDE SERPL-SCNC: 89 MMOL/L (ref 94–109)
CO2 SERPL-SCNC: 31 MMOL/L (ref 20–32)
CREAT SERPL-MCNC: 0.58 MG/DL (ref 0.52–1.04)
GFR SERPL CREATININE-BSD FRML MDRD: 86 ML/MIN/{1.73_M2}
GLUCOSE SERPL-MCNC: 123 MG/DL (ref 70–99)
POTASSIUM SERPL-SCNC: 4.2 MMOL/L (ref 3.4–5.3)
SODIUM SERPL-SCNC: 124 MMOL/L (ref 133–144)

## 2020-12-24 PROCEDURE — 80048 BASIC METABOLIC PNL TOTAL CA: CPT | Performed by: INTERNAL MEDICINE

## 2020-12-28 ENCOUNTER — HOSPITAL ENCOUNTER (OUTPATIENT)
Facility: CLINIC | Age: 80
Setting detail: OBSERVATION
Discharge: NURSING, REHAB OR LONG-TERM CARE | End: 2020-12-29
Attending: EMERGENCY MEDICINE | Admitting: EMERGENCY MEDICINE
Payer: COMMERCIAL

## 2020-12-28 DIAGNOSIS — J20.9 BRONCHITIS WITH BRONCHOSPASM: ICD-10-CM

## 2020-12-28 DIAGNOSIS — S32.030A CLOSED COMPRESSION FRACTURE OF L3 LUMBAR VERTEBRA, INITIAL ENCOUNTER (H): Primary | ICD-10-CM

## 2020-12-28 DIAGNOSIS — M54.50 BILATERAL LOW BACK PAIN WITHOUT SCIATICA, UNSPECIFIED CHRONICITY: ICD-10-CM

## 2020-12-28 DIAGNOSIS — M62.81 GENERALIZED MUSCLE WEAKNESS: ICD-10-CM

## 2020-12-28 DIAGNOSIS — E87.1 HYPONATREMIA: ICD-10-CM

## 2020-12-28 LAB
ALBUMIN UR-MCNC: 50 MG/DL
ANION GAP SERPL CALCULATED.3IONS-SCNC: 5 MMOL/L (ref 3–14)
APPEARANCE UR: CLEAR
BACTERIA #/AREA URNS HPF: ABNORMAL /HPF
BASOPHILS # BLD AUTO: 0.1 10E9/L (ref 0–0.2)
BASOPHILS NFR BLD AUTO: 0.5 %
BILIRUB UR QL STRIP: NEGATIVE
BUN SERPL-MCNC: 8 MG/DL (ref 7–30)
CALCIUM SERPL-MCNC: 8.4 MG/DL (ref 8.5–10.1)
CHLORIDE SERPL-SCNC: 90 MMOL/L (ref 94–109)
CO2 SERPL-SCNC: 30 MMOL/L (ref 20–32)
COLOR UR AUTO: ABNORMAL
CREAT SERPL-MCNC: 0.48 MG/DL (ref 0.52–1.04)
DIFFERENTIAL METHOD BLD: ABNORMAL
EOSINOPHIL # BLD AUTO: 0 10E9/L (ref 0–0.7)
EOSINOPHIL NFR BLD AUTO: 0.3 %
ERYTHROCYTE [DISTWIDTH] IN BLOOD BY AUTOMATED COUNT: 13.8 % (ref 10–15)
FLUABV+SARS-COV-2+RSV PNL RESP NAA+PROBE: NEGATIVE
FLUABV+SARS-COV-2+RSV PNL RESP NAA+PROBE: NEGATIVE
GFR SERPL CREATININE-BSD FRML MDRD: >90 ML/MIN/{1.73_M2}
GLUCOSE SERPL-MCNC: 138 MG/DL (ref 70–99)
GLUCOSE UR STRIP-MCNC: NEGATIVE MG/DL
HCT VFR BLD AUTO: 40 % (ref 35–47)
HGB BLD-MCNC: 13.1 G/DL (ref 11.7–15.7)
HGB UR QL STRIP: NEGATIVE
IMM GRANULOCYTES # BLD: 0.1 10E9/L (ref 0–0.4)
IMM GRANULOCYTES NFR BLD: 0.8 %
KETONES UR STRIP-MCNC: NEGATIVE MG/DL
LABORATORY COMMENT REPORT: NORMAL
LEUKOCYTE ESTERASE UR QL STRIP: ABNORMAL
LYMPHOCYTES # BLD AUTO: 0.4 10E9/L (ref 0.8–5.3)
LYMPHOCYTES NFR BLD AUTO: 4.3 %
MCH RBC QN AUTO: 31.1 PG (ref 26.5–33)
MCHC RBC AUTO-ENTMCNC: 32.8 G/DL (ref 31.5–36.5)
MCV RBC AUTO: 95 FL (ref 78–100)
MONOCYTES # BLD AUTO: 0.9 10E9/L (ref 0–1.3)
MONOCYTES NFR BLD AUTO: 9.7 %
MUCOUS THREADS #/AREA URNS LPF: PRESENT /LPF
NEUTROPHILS # BLD AUTO: 7.8 10E9/L (ref 1.6–8.3)
NEUTROPHILS NFR BLD AUTO: 84.4 %
NITRATE UR QL: NEGATIVE
NRBC # BLD AUTO: 0 10*3/UL
NRBC BLD AUTO-RTO: 0 /100
PH UR STRIP: 7 PH (ref 5–7)
PLATELET # BLD AUTO: 394 10E9/L (ref 150–450)
POTASSIUM SERPL-SCNC: 3.6 MMOL/L (ref 3.4–5.3)
RBC # BLD AUTO: 4.21 10E12/L (ref 3.8–5.2)
RBC #/AREA URNS AUTO: 1 /HPF (ref 0–2)
RSV RNA SPEC QL NAA+PROBE: NORMAL
SARS-COV-2 RNA SPEC QL NAA+PROBE: NEGATIVE
SODIUM SERPL-SCNC: 125 MMOL/L (ref 133–144)
SOURCE: ABNORMAL
SP GR UR STRIP: 1.01 (ref 1–1.03)
SPECIMEN SOURCE: NORMAL
SQUAMOUS #/AREA URNS AUTO: 1 /HPF (ref 0–1)
UROBILINOGEN UR STRIP-MCNC: NORMAL MG/DL (ref 0–2)
WBC # BLD AUTO: 9.2 10E9/L (ref 4–11)
WBC #/AREA URNS AUTO: 14 /HPF (ref 0–5)

## 2020-12-28 PROCEDURE — 250N000013 HC RX MED GY IP 250 OP 250 PS 637: Performed by: EMERGENCY MEDICINE

## 2020-12-28 PROCEDURE — G0378 HOSPITAL OBSERVATION PER HR: HCPCS

## 2020-12-28 PROCEDURE — 85025 COMPLETE CBC W/AUTO DIFF WBC: CPT | Performed by: EMERGENCY MEDICINE

## 2020-12-28 PROCEDURE — 99219 PR INITIAL OBSERVATION CARE,LEVEL II: CPT | Performed by: INTERNAL MEDICINE

## 2020-12-28 PROCEDURE — 81001 URINALYSIS AUTO W/SCOPE: CPT | Performed by: EMERGENCY MEDICINE

## 2020-12-28 PROCEDURE — 87186 SC STD MICRODIL/AGAR DIL: CPT | Performed by: EMERGENCY MEDICINE

## 2020-12-28 PROCEDURE — 87086 URINE CULTURE/COLONY COUNT: CPT | Performed by: EMERGENCY MEDICINE

## 2020-12-28 PROCEDURE — 87636 SARSCOV2 & INF A&B AMP PRB: CPT | Performed by: EMERGENCY MEDICINE

## 2020-12-28 PROCEDURE — 250N000013 HC RX MED GY IP 250 OP 250 PS 637: Performed by: INTERNAL MEDICINE

## 2020-12-28 PROCEDURE — 87088 URINE BACTERIA CULTURE: CPT | Performed by: EMERGENCY MEDICINE

## 2020-12-28 PROCEDURE — 51798 US URINE CAPACITY MEASURE: CPT

## 2020-12-28 PROCEDURE — 99285 EMERGENCY DEPT VISIT HI MDM: CPT | Mod: 25

## 2020-12-28 PROCEDURE — C9803 HOPD COVID-19 SPEC COLLECT: HCPCS

## 2020-12-28 PROCEDURE — 80048 BASIC METABOLIC PNL TOTAL CA: CPT | Performed by: EMERGENCY MEDICINE

## 2020-12-28 RX ORDER — ALBUTEROL SULFATE 0.83 MG/ML
2.5 SOLUTION RESPIRATORY (INHALATION) EVERY 4 HOURS PRN
Status: DISCONTINUED | OUTPATIENT
Start: 2020-12-28 | End: 2020-12-29 | Stop reason: HOSPADM

## 2020-12-28 RX ORDER — IBUPROFEN 200 MG
400 TABLET ORAL ONCE
Status: COMPLETED | OUTPATIENT
Start: 2020-12-28 | End: 2020-12-28

## 2020-12-28 RX ORDER — ATENOLOL 50 MG/1
100 TABLET ORAL DAILY
Status: DISCONTINUED | OUTPATIENT
Start: 2020-12-29 | End: 2020-12-29 | Stop reason: HOSPADM

## 2020-12-28 RX ORDER — ONDANSETRON 4 MG/1
4 TABLET, ORALLY DISINTEGRATING ORAL EVERY 6 HOURS PRN
Status: DISCONTINUED | OUTPATIENT
Start: 2020-12-28 | End: 2020-12-29 | Stop reason: HOSPADM

## 2020-12-28 RX ORDER — ONDANSETRON 2 MG/ML
4 INJECTION INTRAMUSCULAR; INTRAVENOUS EVERY 6 HOURS PRN
Status: DISCONTINUED | OUTPATIENT
Start: 2020-12-28 | End: 2020-12-29 | Stop reason: HOSPADM

## 2020-12-28 RX ORDER — HYDRALAZINE HYDROCHLORIDE 20 MG/ML
5 INJECTION INTRAMUSCULAR; INTRAVENOUS EVERY 4 HOURS PRN
Status: DISCONTINUED | OUTPATIENT
Start: 2020-12-28 | End: 2020-12-29 | Stop reason: HOSPADM

## 2020-12-28 RX ORDER — ACETAMINOPHEN 325 MG/1
650 TABLET ORAL EVERY 4 HOURS PRN
Status: DISCONTINUED | OUTPATIENT
Start: 2020-12-28 | End: 2020-12-29 | Stop reason: HOSPADM

## 2020-12-28 RX ORDER — TRAMADOL HYDROCHLORIDE 50 MG/1
50 TABLET ORAL ONCE
Status: COMPLETED | OUTPATIENT
Start: 2020-12-28 | End: 2020-12-28

## 2020-12-28 RX ORDER — PREDNISONE 5 MG/1
5 TABLET ORAL DAILY
Status: DISCONTINUED | OUTPATIENT
Start: 2020-12-29 | End: 2020-12-29 | Stop reason: HOSPADM

## 2020-12-28 RX ORDER — AMOXICILLIN 250 MG
2 CAPSULE ORAL 2 TIMES DAILY PRN
Status: DISCONTINUED | OUTPATIENT
Start: 2020-12-28 | End: 2020-12-29 | Stop reason: HOSPADM

## 2020-12-28 RX ORDER — NALOXONE HYDROCHLORIDE 0.4 MG/ML
0.2 INJECTION, SOLUTION INTRAMUSCULAR; INTRAVENOUS; SUBCUTANEOUS
Status: DISCONTINUED | OUTPATIENT
Start: 2020-12-28 | End: 2020-12-29 | Stop reason: HOSPADM

## 2020-12-28 RX ORDER — AMOXICILLIN 250 MG
1 CAPSULE ORAL 2 TIMES DAILY PRN
Status: DISCONTINUED | OUTPATIENT
Start: 2020-12-28 | End: 2020-12-29 | Stop reason: HOSPADM

## 2020-12-28 RX ORDER — TRAMADOL HYDROCHLORIDE 50 MG/1
50 TABLET ORAL EVERY 6 HOURS PRN
Status: DISCONTINUED | OUTPATIENT
Start: 2020-12-28 | End: 2020-12-29 | Stop reason: HOSPADM

## 2020-12-28 RX ORDER — ASPIRIN 81 MG/1
81 TABLET ORAL DAILY
Status: DISCONTINUED | OUTPATIENT
Start: 2020-12-29 | End: 2020-12-29 | Stop reason: HOSPADM

## 2020-12-28 RX ORDER — ACETAMINOPHEN 325 MG/1
975 TABLET ORAL ONCE
Status: COMPLETED | OUTPATIENT
Start: 2020-12-28 | End: 2020-12-28

## 2020-12-28 RX ORDER — LOSARTAN POTASSIUM 100 MG/1
100 TABLET ORAL DAILY
Status: DISCONTINUED | OUTPATIENT
Start: 2020-12-29 | End: 2020-12-29 | Stop reason: HOSPADM

## 2020-12-28 RX ORDER — NALOXONE HYDROCHLORIDE 0.4 MG/ML
0.4 INJECTION, SOLUTION INTRAMUSCULAR; INTRAVENOUS; SUBCUTANEOUS
Status: DISCONTINUED | OUTPATIENT
Start: 2020-12-28 | End: 2020-12-29 | Stop reason: HOSPADM

## 2020-12-28 RX ORDER — FERROUS SULFATE 325(65) MG
325 TABLET ORAL
Status: DISCONTINUED | OUTPATIENT
Start: 2020-12-29 | End: 2020-12-29 | Stop reason: HOSPADM

## 2020-12-28 RX ORDER — DOXYCYCLINE 100 MG/1
100 CAPSULE ORAL 2 TIMES DAILY
Status: DISCONTINUED | OUTPATIENT
Start: 2020-12-28 | End: 2020-12-29 | Stop reason: HOSPADM

## 2020-12-28 RX ORDER — IBUPROFEN 400 MG/1
400 TABLET, FILM COATED ORAL EVERY 6 HOURS PRN
Status: DISCONTINUED | OUTPATIENT
Start: 2020-12-28 | End: 2020-12-29 | Stop reason: HOSPADM

## 2020-12-28 RX ORDER — POLYETHYLENE GLYCOL 3350 17 G/17G
17 POWDER, FOR SOLUTION ORAL DAILY
Status: DISCONTINUED | OUTPATIENT
Start: 2020-12-28 | End: 2020-12-29 | Stop reason: HOSPADM

## 2020-12-28 RX ORDER — HYDROXYCHLOROQUINE SULFATE 200 MG/1
200 TABLET, FILM COATED ORAL 2 TIMES DAILY
Status: DISCONTINUED | OUTPATIENT
Start: 2020-12-28 | End: 2020-12-29 | Stop reason: HOSPADM

## 2020-12-28 RX ORDER — BISACODYL 10 MG
10 SUPPOSITORY, RECTAL RECTAL DAILY PRN
Status: DISCONTINUED | OUTPATIENT
Start: 2020-12-28 | End: 2020-12-29 | Stop reason: HOSPADM

## 2020-12-28 RX ORDER — LEVOCARNITINE 330 MG/1
990 TABLET ORAL 2 TIMES DAILY
Status: DISCONTINUED | OUTPATIENT
Start: 2020-12-28 | End: 2020-12-29 | Stop reason: HOSPADM

## 2020-12-28 RX ADMIN — ACETAMINOPHEN 975 MG: 325 TABLET, FILM COATED ORAL at 11:28

## 2020-12-28 RX ADMIN — TRAMADOL HYDROCHLORIDE 50 MG: 50 TABLET, FILM COATED ORAL at 11:28

## 2020-12-28 RX ADMIN — HYDROXYCHLOROQUINE SULFATE 200 MG: 200 TABLET, FILM COATED ORAL at 19:58

## 2020-12-28 RX ADMIN — IBUPROFEN 400 MG: 200 TABLET, FILM COATED ORAL at 16:30

## 2020-12-28 RX ADMIN — DOXYCYCLINE HYCLATE 100 MG: 100 CAPSULE ORAL at 19:58

## 2020-12-28 RX ADMIN — POLYETHYLENE GLYCOL 3350 17 G: 17 POWDER, FOR SOLUTION ORAL at 19:58

## 2020-12-28 RX ADMIN — LEVOCARNITINE 990 MG: 330 TABLET ORAL at 19:58

## 2020-12-28 RX ADMIN — TRAMADOL HYDROCHLORIDE 50 MG: 50 TABLET, FILM COATED ORAL at 20:00

## 2020-12-28 ASSESSMENT — ACTIVITIES OF DAILY LIVING (ADL): DEPENDENT_IADLS:: CLEANING;COOKING;LAUNDRY;SHOPPING;MEAL PREPARATION;TRANSPORTATION;INCONTINENCE

## 2020-12-28 NOTE — H&P
[facility]  Hospitalist Admission Note  Name: Nicolás Wyman    MRN: [id number]  YOB: 1940    Age: 80 year old  Date of admission: 12/28/2020  Primary care provider: Yocasta Hernandez    Chief Complaint:  Generalized weakness    Assessment and Plan:   Generalized weakness: Admission 12/17-12/21 for UTI and hyponatremia causing generalized weakness.  Sodium levels improved, but ongoing weakness at home worse over the past couple of days.  Unable to stand on her own.  Failing at home despite assistance from family.  PT and OT home care had not got into the home yet due to being very busy per report.  Has some ongoing chronic hyponatremia as below although level similar to when she left and was feeling stronger, so I doubt this is contributing significantly.  Afebrile and no infectious symptoms.  Urinalysis better and doubt UTI.  -Asymptomatic COVID-19 PCR and influenza PCR negative  -Consult SW and PT, anticipate need for TCU  -Ambulate with assist  -Fall precautions    Chronic hyponatremia: Chronic low sodium around 130.  Acutely low at 116 during hospitalization 12/16/2020.  Nephrology was consulted.  Urine osmolality elevated at 345 and suspicion was for SIADH.  Nephrology was consulted.  Slowly improved up to 126 with 1.2 L fluid restriction which she has continued at home and sodium levels currently 125.  -Continue 1.2 L fluid restriction, check BMP tomorrow    Recent UTI: Treated with 4 days of IV ceftriaxone for presumed UTI last admission.  UA then had 24 WBCs and large LE.  Urine culture grew Proteus.  UA now with 14 WBCs and small LE.  No fever or leukocytosis.  No infectious symptoms to suggest ongoing UTI.  Hold off on any empiric antibiotics.    Subacute L3 compression fracture: She had a significant fall roughly 2 weeks ago.  Has bruising on her left legs.  X-ray of the lumbar spine last week showed a age-indeterminate L3 compression fracture, but suspect acute then now subacute.   Ongoing back pain despite ibuprofen and tramadol 50 mg 3 times daily.  The tramadol does help with pain.  No radicular symptoms.  Has some urinary retention after tramadol.   -Continue acetaminophen, ibuprofen 400 mg, tramadol 50 mg as needed  -Patient had question regarding epidural steroid injection for this indication as she had one in her hip 2 weeks ago that was helpful.  I doubt this will be helpful for compression fracture.  May benefit from bracing when up if not able to precipitate with PT tomorrow, if ongoing pain over the next month may need consideration for vertebroplasty    Urinary retention: Issues with urinary retention last admission requiring Crocker catheter placement that was removed prior to discharge.  Does have some ongoing retention after taking tramadol, but she is eventually able to void.  No other urinary symptoms.  -Bladder scan and intermittent straight cath as needed    RA, discoid lupus: Chronically on Plaquenil 200 mg twice daily and prednisone 5 mg daily.  Has been dealing with spine pain for the past couple of months, but this may be from the L3 compression fracture.  She denies any recent steroid bursts.  -Continue Plaquenil and prednisone    HTN:  Blood pressure 140-170 systolic in the ER.  PTA on amlodipine 7.5 mg daily, losartan 100 mg daily, atenolol 100 mg daily.  -Resume PTA regimen, if persistently hypertensive.  Increase amlodipine to 10 mg daily  -5 mg IV hydralazine as needed    Morbid obesity: BMI 41.    Pulmonary fibrosis: History of pulmonary interstitial fibrosis.  I also see COPD in the history tab although no history of smoking.  Does not need oxygen at baseline.  Uses albuterol as needed, nebs while here.  No increased shortness of breath from baseline.    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  Chronically on doxycycline 100 mg daily which I suspect is for suppression of infection, continue while here.    DVT Prophylaxis: Low Risk/Ambulatory  with no VTE prophylaxis indicated  Code Status: Full Code  FEN: regular diet, 1.2 L fluid restriction  Discharge Dispo: PT/SW consult, anticipate needs TCU.  Family helping at home, but too weak to stand.  Home care never made it into home  Estimated Disch Date / # of Days until Disch: Admit to observation for ongoing generalized weakness and L3 compression fracture without any clear acute issue.  Anticipate needs TCU, unable to get placement from ER.      History of Present Illness:  Nicolás Wyman is a 80 year old female with PMH including RA on chronic prednisone and Plaquenil, discoid lupus, glaucoma, non-Hodgkin lymphoma with previous chemotherapy, morbid obesity, infected right TKA with previous explant on chronic doxycycline, pulmonary fibrosis not on home oxygen, HTN, recent fall 2 weeks ago resulting in L3 compression fracture, and recent admission for UTI, acute on chronic hyponatremia who presents from home due to ongoing weakness.  The patient was hospitalized from 12/17 through 12/21 for sodium level of 116 along with suspected UTI.  She was treated with IV ceftriaxone for positive urine culture growing Proteus.  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  Sodium level improved to 124 by discharge with 1.2 L fluid restriction.  Repeat levels up to 126 in clinic.  She has maintained the previous fluid restriction.  She had some urinary retention requiring Crocker catheter last admission that was removed prior to discharge.  Does have some ongoing issues with retention after taking tramadol, but is eventually able to void.  No other change in her medications.  Denies any fevers, chills, dysuria, nausea, vomiting, diarrhea, abdominal pain, flank pain, hematuria.  She continues to have central low mid back pain from her previous compression fracture and takes ibuprofen and tramadol for this.  Over the past few days she has been progressively weak to the point  where she needs assistance getting up.  Earlier today she had to sit down while going to the car and she could not stand up.  Denies fall.  Despite family helping her at home she is unable to complete her ADLs.  Home care was ordered, but therapy has not been out of the home yet since discharge.    History obtained from patient, medical record, and from Dr. Gerard in the emergency department.  Initial blood pressure 170/58 with repeat 140 systolic.  Heart rate in 70s.  Temperature 97.1  F.  Oxygen 95% on room air.  Labs show sodium 125, potassium 3.6, chloride 90, bicarb 30, BUN 8, creatinine 0.48.  Blood glucose is 138.  WBC 9.2, hemoglobin 13.1, platelet count 394.  Urinalysis shows 14 WBCs and small LE.  COVID-19 PCR and influenza A/B PCR is negative.  Social work consulted in the ER, unable to get placement to TCU.  Admit to observation for assistance in placement.     Past Medical History reviewed:  Past Medical History:   Diagnosis Date     Anesthesia complication     hypoxia postop     Discoid lupus      Disorder of bone and cartilage, unspecified      Essential hypertension, benign      Glaucoma      Idiopathic interstitial pneumonia 11/02    Pulmonary fibrosis, hypoxia postop     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    removal right TKA 6/12     Non Hodgkin's lymphoma (H) 6/14    chemo x4, clearing by PET     Pulmonary interstitial fibrosis (H)      Rheumatoid arthritis(714.0)     [facility]     Steroid long-term use      Past Surgical History reviewed:  Past Surgical History:   Procedure Laterality Date     ARTHROPLASTY KNEE  11/7/2011    Procedure:ARTHROPLASTY KNEE; Right Total Knee Arthroplasty  [facility]; Surgeon:DIVINA BAUTISTA; Location: OR     ARTHROPLASTY REVISION KNEE  12/29/2011    Procedure:ARTHROPLASTY REVISION KNEE; Irrigation and debridement, right total knee arthroplasty with polyethylene exchange.  ; Surgeon:DIVINA BAUTISTA; Location: OR     [facility] [place]  HYSTERECTOMY  1981    has ovaries      EXCHANGE POLY COMPONENT ARTHROPLASTY KNEE  11/26/2011    Procedure:EXCHANGE POLY COMPONENT ARTHROPLASTY KNEE; EXCHANGE POLY COMPONENT ARTHROPLASTY KNEE RIGHT, irrigation and debridement right knee; Surgeon:FELIPE TAYLOR; Location:[facility] OR     HC CORRECT BUNION,SIMPLE  2002     HC REMOVAL GALLBLADDER  1981     IRRIGATION AND DEBRIDEMENT KNEE, COMBINED  11/26/2011    Procedure:COMBINED IRRIGATION AND DEBRIDEMENT KNEE; Surgeon:FELIPE TAYLOR; Location:[facility] OR     IRRIGATION AND DEBRIDEMENT KNEE, COMBINED  12/29/2011    Procedure:COMBINED IRRIGATION AND DEBRIDEMENT KNEE; Surgeon:DIVINA BAUTISTA; Location:[facility] OR     IRRIGATION AND DEBRIDEMENT KNEE, COMBINED  2/9/2012    Procedure:COMBINED IRRIGATION AND DEBRIDEMENT KNEE; Wound Debridement Right Knee with Placement of Wound Vac; Surgeon:DIVINA BAUTISTA; Location:[facility] OR     ZZC NONSPECIFIC PROCEDURE  6/22/12    removal of right TKA     Social History reviewed:  Social History     Tobacco Use     Smoking status: Never Smoker     Smokeless tobacco: Never Used   Substance Use Topics     Alcohol use: No     Social History     Social History Narrative     Not on file     Family History reviewed:  Family History   Problem Relation Age of Onset     Heart Disease Mother      Hypertension Mother      Eye Disorder Mother         macular degen     Family History Negative Father      Allergies:  Allergies   Allergen Reactions     Ace Inhibitors Cough     Latex GI Disturbance     lip swelling     Liquid Adhesive Rash     Tape [Adhesive Tape] Rash     Medications:  Prior to Admission medications    Medication Sig Last Dose Taking? Auth Provider   acetylcysteine (N-ACETYL CYSTEINE) 600 MG CAPS capsule Take 1 capsule (600 mg) by mouth daily 12/28/2020 at am Yes Yocasta Hernandez MD   albuterol (2.5 MG/3ML) 0.083% nebulizer solution Take 1 vial (2.5 mg) by nebulization every 6 hours as needed 12/28/2020 at am Yes Yocasta Hernandez MD    amLODIPine (NORVASC) 5 MG tablet TAKE 1 & 1/2 (ONE & ONE-HALF) TABLETS BY MOUTH ONCE DAILY 12/28/2020 at am Yes Yocasta Hernandez MD   aspirin 81 MG tablet Take 1 tablet (81 mg) by mouth daily 12/28/2020 at am Yes Geoffrey Menendez MD   atenolol (TENORMIN) 100 MG tablet Take 1 tablet (100 mg) by mouth daily 12/28/2020 at am Yes Yocasta Hernandez MD   cholecalciferol (VITAMIN D3) 1000 UNIT tablet Take 1 tablet (1,000 Units) by mouth daily 12/27/2020 at pm Yes Geoffrey Menendez MD   doxycycline (VIBRAMYCIN) 100 MG capsule Take 1 capsule by mouth 2 times daily. 12/28/2020 at am Yes Yocasta Hernandez MD   ferrous sulfate (IRON) 325 (65 Fe) MG tablet Take 1 tablet (325 mg) by mouth daily (with breakfast) 12/27/2020 at pm Yes Geoffrey Menendez MD   Glucosamine 500 MG CAPS Take 1 capful by mouth daily. 12/27/2020 at Unknown time Yes Unknown, Entered By History   hydroxychloroquine (PLAQUENIL) 200 MG tablet Take 1 tablet by mouth 2 times daily. 12/28/2020 at am Yes Yocasta Hernandez MD   levOCARNitine (CARNITOR) 330 MG tablet Take 990 mg by mouth 2 times daily  12/28/2020 at am Yes Yocasta Hernandez MD   lidocaine (LIDODERM) 5 % patch Place 1 patch onto the skin every 24 hours for 10 days To prevent lidocaine toxicity, patient should be patch free for 12 hrs daily. 12/27/2020 at pm Yes Enzo Elmore MD, MD   losartan (COZAAR) 100 MG tablet Take 1 tablet (100 mg) by mouth daily 12/28/2020 at am Yes Yocasta Hernandez MD   predniSONE (DELTASONE) 5 MG tablet Take 5 mg by mouth daily  12/28/2020 at am Yes Reported, Patient   traMADol (ULTRAM) 50 MG tablet Take 50 mg by mouth 3 times daily 12/28/2020 at am Yes Unknown, Entered By History   albuterol (PROAIR HFA, PROVENTIL HFA, VENTOLIN HFA) 108 (90 BASE) MCG/ACT inhaler Inhale 2 puffs into the lungs every 6 hours as needed for shortness of breath / dyspnea More than a month at Unknown time  Yocasta Hernandez MD   IBANdronate (BONIVA) 150 MG tablet Take 1 tablet (150 mg) by mouth every 30 days Take  60 minutes before am meal with 8 oz. water. Remain upright for 60 minutes. More than a month at Unknown time  Yocasta Hernandez MD     Review of Systems:  A Comprehensive greater than 10 system review of systems was carried out.  Pertinent positives and negatives are noted above.  Otherwise negative.     Physical Exam:  Blood pressure (!) 146/74, pulse 65, temperature 97.1  F (36.2  C), temperature source Temporal, resp. rate 16, SpO2 93 %, not currently breastfeeding.  Wt Readings from Last 1 Encounters:   12/21/20 88.2 kg (194 lb 8 oz)     Exam:  Constitutional: Awake, NAD   Eyes: sclera white   HEENT: atraumatic, MMM  Respiratory: no respiratory distress, lungs cta bilaterally, no crackles.  Slight forced expiratory wheeze  Cardiovascular: RRR.  No murmur   GI: Obese, non-tender, not distended, bowel sounds present  Skin: Ecchymoses to left lower leg, no rash  Musculoskeletal/extremities: no major deformities. No edema  Neurologic: A&Ox3, speech clear, strength equal in all extremities, light touch sensation intact  Psychiatric: calm, cooperative, normal affect    Lab and imaging data personally reviewed:  Labs:  Recent Labs   Lab 12/28/20  1054   WBC 9.2   HGB 13.1   HCT 40.0   MCV 95        Recent Labs   Lab 12/28/20  1054 12/24/20  1315   * 124*   POTASSIUM 3.6 4.2   CHLORIDE 90* 89*   CO2 30 31   ANIONGAP 5 4   * 123*   BUN 8 11   CR 0.48* 0.58   GFRESTIMATED >90 86   GFRESTBLACK >90 >90   KRISTOPHER 8.4* 8.7     Recent Labs   Lab 12/28/20  1229   COLOR Light Yellow   APPEARANCE Clear   URINEGLC Negative   URINEBILI Negative   URINEKETONE Negative   SG 1.015   UBLD Negative   URINEPH 7.0   PROTEIN 50*   NITRITE Negative   LEUKEST Small*   RBCU 1   WBCU 14*     COVID-19 PCR negative  Influenza A/B PCR negative      Imaging:  None obtained    Bang Robertson MD  Hospitalist  [facility]

## 2020-12-28 NOTE — ED PROVIDER NOTES
History   Chief Complaint:  Abnormal Labs and Urinary Retention       The history is provided by the patient and a relative.      Nicolás Wyman is a 80 year old female with history of Non-Hodgkin's lymphoma, COPD, discoid lupus, pulmonary fibrosis, and hypertension who presents with low sodium and urinary retention. Of note, the patient was recently admitted from 12/17 to 12/21 for UTI. She was noted to have sodium levels of 116 during her admission. The patient's sodium level was 124 when she was discharged from the hospital, and her levels were 126 upon recheck on 12/24. The patient's daughter reports that she has been urinating less frequently secondary to her taking Tramadol for her pain. She has also been feeling increasingly groggy and weak, and the daughter states that the patient is acting as she did just prior to her admission, and is getting weaker every day. Her sister has been helping the patient get up and around the house as she cannot do it herself. While walking the patient down the garage steps out to her car, she sat right down on the floor. The daughter believes that the patient may have been fatigued from a shower she took that morning. Patient denies feeling any chest pain prior to this, did not fall or lose consciousness, and was brought to standing by her daughter. The daughter reports that she is still following her fluids restrictions and is struggling to drink 1,200 mL per day. Daughter also states that the patient was set up for home health care but no one has contacted them yet to make appointments for PT or OT as they are apparently very busy. The patient denies any fevers, shortness of breath, cough, headaches, slurred speech, or recent falls/trauma. Of note, the patient is currently having a flare-up of her rheumatoid arthritis in her spine, which has been going on for a couple of months. RN notes that the patient is still bruised from a bad fall she had two weeks ago. Patient is  currently taking prednisone to cover for COVID. She has a follow-up with her PCP tomorrow via virtual visit.    Review of Systems   All other systems reviewed and are negative.      Allergies:  Ace Inhibitors  Latex    Medications:  Albuterol inhaler  Amlodipine  Atenolol  Doxycycline monohydrate  Ferrous sulfate  Hydroxychloroquine  Ibandronate  Levocarnitine  Losartan  Prednisone  Tramadol    Past Medical History:    Discoid lupus  Essential hypertension  Non-Hodgkin's lymphoma  Asthma  Pulmonary interstitial fibrosis  Rheumatoid arthritis  GERD without esophagitis  Morbid obesity  Iron deficiency anemia  Anxiety  MOHS surgery  COPD    Past Surgical History:    Right knee arthroplasty  Right knee arthroplasty revision with I & D  Hysterectomy  Exchange poly component for right knee  Bunion correction  Cholecystectomy  I & D of knee x3  Removal of right TKA    Family History:    Mother - macular degeneration, hypertension, CAD  Sister - dementia    Social History:  The patient was not accompanied to the ED.  Marital Status:     Physical Exam     Patient Vitals for the past 24 hrs:   BP Temp Temp src Pulse Resp SpO2   12/28/20 1300 (!) 146/74 -- -- 65 -- 93 %   12/28/20 1245 -- -- -- -- -- 95 %   12/28/20 1230 (!) 148/121 -- -- 76 -- 96 %   12/28/20 1215 -- -- -- -- -- 93 %   12/28/20 1200 (!) 140/119 -- -- 65 -- 97 %   12/28/20 1145 -- -- -- -- -- 95 %   12/28/20 1130 -- -- -- -- -- 94 %   12/28/20 1115 -- -- -- -- -- 97 %   12/28/20 1100 (!) 170/58 -- -- 75 -- 97 %   12/28/20 1030 -- -- -- -- -- 96 %   12/28/20 1004 (!) 177/87 97.1  F (36.2  C) Temporal 83 16 99 %       Physical Exam  General: Resting on the bed.  Head: No obvious trauma to head.  Ears, Nose, Throat:  External ears normal.  Nose normal.    Eyes:  Conjunctivae clear.  Pupils are equal, round, and reactive.   Neck: Normal range of motion.  Neck supple.   CV: Regular rate and rhythm.  No murmurs.      Respiratory: Effort normal and breath  sounds normal.  No wheezing or crackles.   Gastrointestinal: Soft.  No distension. There is no tenderness.    Neuro: Alert. Moving all extremities appropriately.  Normal speech.  CN II-XII grossly intact, no pronator drift, normal finger-nose-finger, visual fields intact.  Gross muscle strength intact of the proximal and distal bilateral upper and lower extremities.  Sensation intact to light touch in all 4 extremities.   Skin: Skin is warm and dry.  No rash noted.  Ecchymosis noted on the left lower extremity, left upper extremity, old per the patient.  Emergency Department Course     Laboratory:  CBC: WBC 9.2, HGB 13.1,   BMP: Sodium 125 (L), Chloride 90 (L), Glucose 138 (H), Creatinine 0.48 (L), Calcium 8.4 (L), o/w WNL    UA with micro: Protein albumin 50 (A), Small leukocyte esterase (A), WBC 14 (H), Few bacteria (A), Mucous present (A), o/w negative  Urine Culture Aerobic Bacterial: Pending     Influenza A & B: Negative  COVID-19 Virus (Coronavirus) by Nasopharyngeal (NP) Swab: Negative    Procedures  None.    Emergency Department Course:    Reviewed:  I reviewed nursing notes, vitals, past medical history and care everywhere.    Assessments:  1023 I initially evaluated the patient in ED room 27.   1611 Patient reassessed.     Interventions:  1128 Tylenol 975 mg PO  1128 Ultram 50 mg PO  Ibuprofen 400 mg PO    Disposition:  The patient will be admitted to observation to facilitate placement.      Impression & Plan       Medical Decision Makin-year-old female presents with abnormal labs and persistent generalized weakness.  Vital signs are reassuring.  Broad differential was pursued include not limited to acute UTI, electrolyte, metabolic, renal dysfunction, anemia, stroke, mass, tumor, intracranial hemorrhage, etc.  Patient has a nonfocal neurologic exam.  She endorses generalized weakness alone.  No recent falls or trauma.  She seems to have worsening weakness since her discharge.  She is unable  to do PT OT due to system problems.  CBC shows no leukocytosis or anemia.  BMP shows chronic hyponatremia 125 but no other acute electrolyte or metabolic abnormalities.  Normal kidney function.  UA looks largely unremarkable and urine culture is pending.  She was recently treated for UTI and her UA overall looks improved.  Covid swab was negative.  Flu swab was negative.  Patient has a nonfocal neurologic exam I have low sufficient for stroke, tumor, mass.  No trauma to indicate intracranial hemorrhage or that neuroimaging is indicated.  Patient and daughter are reassured by labs.  They feel comfortable with plan to pursue placement at a rehab facility.  Case management was consulted and is helping to arrange outpatient placement for the patient.  Unfortunately we are unable to find placement for the patient today therefore patient will be admitted to the hospitalist for observation and hopefully placement thereafter.    Covid-19  Nicolás Wyman was evaluated during a global COVID-19 pandemic, which necessitated consideration that the patient might be at risk for infection with the SARS-CoV-2 virus that causes COVID-19.   Applicable protocols for evaluation were followed during the patient's care.   COVID-19 was considered as part of the patient's evaluation. A test was obtained during this visit.    Diagnosis:    ICD-10-CM    1. Generalized muscle weakness  M62.81    2. Hyponatremia  E87.1          Scribe Disclosure:  I, Radha Adair, am serving as a scribe at 10:23 AM on 12/28/2020 to document services personally performed by Iva Gerard MD based on my observations and the provider's statements to me.     Radha Adair  12/28/2020   [facility] EMERGENCY DEPARTMENT       Iva Gerard MD  12/28/20 1647       Iva Gerard MD  12/28/20 1658       Iva Gerard MD  12/28/20 1701

## 2020-12-28 NOTE — PHARMACY-ADMISSION MEDICATION HISTORY
Admission medication history interview status for this patient is complete. See [facility] admission navigator for allergy information, prior to admission medications and immunization status.     Medication history interview done via telephone during Covid-19 pandemic, indicate source(s): Patient  Medication history resources (including written lists, pill bottles, clinic record): Pt AVS from 12/21/20, SureScripts, and Care Everywhere  Pharmacy: [facility]. Send discharge Rx to [facility].     Changes made to PTA medication list:  Added: none  Deleted: none  Changed: none    Actions taken by pharmacist (provider contacted, etc): Called pt to verify home med list     Additional medication history information: Pt takes prednisone continuously. Pt has missed past few months of ibandronate doses.     Medication reconciliation/reorder completed by provider prior to medication history?  N   (Y/N)       Prior to Admission medications    Medication Sig Last Dose Taking? Auth Provider   acetylcysteine (N-ACETYL CYSTEINE) 600 MG CAPS capsule Take 1 capsule (600 mg) by mouth daily 12/28/2020 at am Yes Yocasta Hernandez MD   albuterol (2.5 MG/3ML) 0.083% nebulizer solution Take 1 vial (2.5 mg) by nebulization every 6 hours as needed 12/28/2020 at am Yes Yocasta Hernandez MD   amLODIPine (NORVASC) 5 MG tablet TAKE 1 & 1/2 (ONE & ONE-HALF) TABLETS BY MOUTH ONCE DAILY 12/28/2020 at am Yes Yocasta Hernandez MD   aspirin 81 MG tablet Take 1 tablet (81 mg) by mouth daily 12/28/2020 at am Yes Geoffrey Menendez MD   atenolol (TENORMIN) 100 MG tablet Take 1 tablet (100 mg) by mouth daily 12/28/2020 at am Yes Yocasta Hrenandez MD   cholecalciferol (VITAMIN D3) 1000 UNIT tablet Take 1 tablet (1,000 Units) by mouth daily 12/27/2020 at pm Yes Geoffrey Menendez MD   doxycycline (VIBRAMYCIN) 100 MG capsule Take 1 capsule by mouth 2 times daily. 12/28/2020 at am Yes Yocasta Hernandez MD   ferrous sulfate (IRON) 325 (65 Fe) MG tablet Take 1 tablet (325 mg) by  mouth daily (with breakfast) 12/27/2020 at pm Yes Geoffrey Menendez MD   Glucosamine 500 MG CAPS Take 1 capful by mouth daily. 12/27/2020 at Unknown time Yes Unknown, Entered By History   hydroxychloroquine (PLAQUENIL) 200 MG tablet Take 1 tablet by mouth 2 times daily. 12/28/2020 at am Yes Yocasta Hernandez MD   levOCARNitine (CARNITOR) 330 MG tablet Take 990 mg by mouth 2 times daily  12/28/2020 at am Yes Yocasta Hernandez MD   lidocaine (LIDODERM) 5 % patch Place 1 patch onto the skin every 24 hours for 10 days To prevent lidocaine toxicity, patient should be patch free for 12 hrs daily. 12/27/2020 at pm Yes Enzo Elmore MD, MD   losartan (COZAAR) 100 MG tablet Take 1 tablet (100 mg) by mouth daily 12/28/2020 at am Yes Yocasta Hernandez MD   predniSONE (DELTASONE) 5 MG tablet Take 5 mg by mouth daily  12/28/2020 at am Yes Reported, Patient   traMADol (ULTRAM) 50 MG tablet Take 50 mg by mouth 3 times daily 12/28/2020 at am Yes Unknown, Entered By History   albuterol (PROAIR HFA, PROVENTIL HFA, VENTOLIN HFA) 108 (90 BASE) MCG/ACT inhaler Inhale 2 puffs into the lungs every 6 hours as needed for shortness of breath / dyspnea More than a month at Unknown time  Yocasta Hernandez MD   IBANdronate (BONIVA) 150 MG tablet Take 1 tablet (150 mg) by mouth every 30 days Take 60 minutes before am meal with 8 oz. water. Remain upright for 60 minutes. More than a month at Unknown time  Yocasta Hernandez MD

## 2020-12-28 NOTE — ED NOTES
Care Management Initial Consult    General Information  Assessment completed with: Children, (daughter, Yady, [phone])     Primary Care Provider verified and updated as needed:   No  Readmission within the last 30 days: previous discharge plan unsuccessful .  Daughter reports that home care providers did not call to make appointments to meet with Pt.  Return Category: Medically unsuccessful treatment plan  Reason for Consult: facility placement  Advance Care Planning: Advance Care Planning Reviewed: present on chart  , monie Batista is Health care agent,  [phone].      Communication Assessment  Patient's communication style: spoken language (English or Bilingual)        Cognitive  Cognitive/Neuro/Behavioral:    Daughter reports that Pt has some forgetfulness                    Living Environment:   People in home: child(onelia), adult  .  Lives with monie Conteh   Current living Arrangements: house      Able to return to prior arrangements:  Yes, once stronger and more independent    Family/Social Support:  Care provided by: child(onelia), other (see comments) , sister assists  Provides care for: no one, unable/limited ability to care for self  Marital Status:   Children, Sibling(s)          Description of Support System: Supportive, Involved       Current Resources:   Skilled Home Care Services:  Home care was ordered at prior hospital discharge, but Pt was never opened to services.  Community Resources:  unknown  Equipment currently used at home:    unknown  Supplies currently used at home:    unknown    Employment/Financial:  Employment Status: retired        Financial Concerns: No concerns identified      Lifestyle & Psychosocial Needs:        Socioeconomic History     Marital status:      Spouse name: Not on file     Number of children: 4     Years of education: Not on file     Highest education level: Not on file     Tobacco Use     Smoking status: Never Smoker     Smokeless tobacco:  "Never Used   Substance and Sexual Activity     Alcohol use: No     Drug use: No     Sexual activity: Never       Functional Status:  Prior to admission patient needed assistance:   Dependent ADLs:: Ambulation-walker, Bathing, Toileting  Dependent IADLs:: Cleaning, Cooking, Laundry, Shopping, Meal Preparation, Transportation, Incontinence   Pt is currently not at baseline.    Mental Health Status:  unknown        Chemical Dependency Status:  unknown        Values/Beliefs:  Spiritual, Cultural Beliefs, Cheondoism Practices, Values that affect care:           unknown      Additional Information:    Daughter and Pt and ED provider are in agreement, that TCU placement would be a safer environment for Pt, until she gets stronger and can return to home.    Pt tested COVID negative today and would like a private room.   [place]    [place]U referrals were sent to the following facilities:    [facility]:  No beds available at this time.  [facility]:  No beds available at this time.  [facility] :  No beds available at this time, they may possibly have a bed on Tuesday, 12/29/2020.  [facility];  Referral Pending.  [facility]:   Referral Pending  [facility]:   Referral Pending  [facility]:  Referral Pending  [facility]:  Referral Pending  [facility] [place] [facility]:  May have a bed on Wednesday, 12/30/20, please call on 12/29/20      Ivory Cardenas RN Care Coordinator,  BSN, PHN, CCM, EFRAIN  Inpatient Care Coordination - Emergency Department  [facility]   [phone]          "

## 2020-12-28 NOTE — ED NOTES
Bladder scanned pt, highest amount visualized was 18 mL. Pt state's they went to the bathroom prior to arriving here.

## 2020-12-28 NOTE — ED NOTES
[facility]  ED Nurse Handoff Report    Nicolás Wyman is a 80 year old female   ED Chief complaint: Abnormal Labs and Urinary Retention  . ED Diagnosis:   Final diagnoses:   Generalized muscle weakness   Hyponatremia     Allergies:   Allergies   Allergen Reactions     Ace Inhibitors Cough     Latex GI Disturbance     lip swelling     Liquid Adhesive Rash     Tape [Adhesive Tape] Rash       Code Status: Full Code  Activity level - Baseline/Home:  Independent. Activity Level - Current:   Assist X2. Lift room needed: No. Bariatric: No   Needed: No   Isolation: No. Infection: Not Applicable.     Vital Signs:   Vitals:    12/28/20 1430 12/28/20 1445 12/28/20 1500 12/28/20 1515   BP: (!) 149/74  (!) 143/74    Pulse: 66  70    Resp:       Temp:       TempSrc:       SpO2: 95% 96% 94% 95%       Cardiac Rhythm:  ,      Pain level:    Patient confused: No. Patient Falls Risk: Yes.   Elimination Status: Has voided   Patient Report - Initial Complaint: low sodium, weakness. Focused Assessment: Patient came in for evaluation of increased weakness, decreased urination, and a recent history of hyponatremia. Patient and family report patient had been improving since recent discharge from hospital but is now getting weaker again, concerned sodium was low again. Patient also concerned about increased back pain, patient has chronic back pain but is requiring more medication than typical over the last few days. ABCs intact.    Tests Performed: labs. Abnormal Results:   Labs Ordered and Resulted from Time of ED Arrival Up to the Time of Departure from the ED   CBC WITH PLATELETS DIFFERENTIAL - Abnormal; Notable for the following components:       Result Value    Absolute Lymphocytes 0.4 (*)     All other components within normal limits   BASIC METABOLIC PANEL - Abnormal; Notable for the following components:    Sodium 125 (*)     Chloride 90 (*)     Glucose 138 (*)     Creatinine 0.48 (*)     Calcium 8.4 (*)      All other components within normal limits   ROUTINE UA WITH MICROSCOPIC - Abnormal; Notable for the following components:    Protein Albumin Urine 50 (*)     Leukocyte Esterase Urine Small (*)     WBC Urine 14 (*)     Bacteria Urine Few (*)     Mucous Urine Present (*)     All other components within normal limits   INFLUENZA A/B & SARS-COV2 PCR MULTIPLEX   URINE CULTURE AEROBIC BACTERIAL     .   Treatments provided: see MAR  Family Comments: daughter was available by phone for assessment and history  OBS brochure/video discussed/provided to patient:  Yes  ED Medications:   Medications   acetaminophen (TYLENOL) tablet 975 mg (975 mg Oral Given 12/28/20 1128)   traMADol (ULTRAM) tablet 50 mg (50 mg Oral Given 12/28/20 1128)   ibuprofen (ADVIL/MOTRIN) tablet 400 mg (400 mg Oral Given 12/28/20 1630)     Drips infusing:  No  For the majority of the shift, the patient's behavior Green. Interventions performed were standard.    Sepsis treatment initiated: No     Patient tested for COVID 19 prior to admission: YES    ED Nurse Name/Phone Number: Helene MORALES RN,   5:50 PM    RECEIVING UNIT ED HANDOFF REVIEW    Above ED Nurse Handoff Report was reviewed: Yes  Reviewed by: Janet Quan RN on December 28, 2020 at 5:57 PM

## 2020-12-28 NOTE — ED TRIAGE NOTES
Patient presents with hyponatremia, urinary retention and back pain. Patient was discharged from hospital 12/21 with hyponatremia, discharged 126. On 12/24, it was 124. She has been having urinary retention and pressure. Also having pain in her back which is chronic, and she has been taking Tramadol every 4 hours for pain. Also no energy.

## 2020-12-29 ENCOUNTER — APPOINTMENT (OUTPATIENT)
Dept: PHYSICAL THERAPY | Facility: CLINIC | Age: 80
End: 2020-12-29
Attending: INTERNAL MEDICINE
Payer: COMMERCIAL

## 2020-12-29 VITALS
SYSTOLIC BLOOD PRESSURE: 167 MMHG | RESPIRATION RATE: 16 BRPM | HEART RATE: 76 BPM | DIASTOLIC BLOOD PRESSURE: 73 MMHG | OXYGEN SATURATION: 94 % | TEMPERATURE: 96.3 F

## 2020-12-29 LAB
ANION GAP SERPL CALCULATED.3IONS-SCNC: 2 MMOL/L (ref 3–14)
BUN SERPL-MCNC: 6 MG/DL (ref 7–30)
CALCIUM SERPL-MCNC: 7.8 MG/DL (ref 8.5–10.1)
CHLORIDE SERPL-SCNC: 90 MMOL/L (ref 94–109)
CO2 SERPL-SCNC: 33 MMOL/L (ref 20–32)
CREAT SERPL-MCNC: 0.42 MG/DL (ref 0.52–1.04)
GFR SERPL CREATININE-BSD FRML MDRD: >90 ML/MIN/{1.73_M2}
GLUCOSE SERPL-MCNC: 84 MG/DL (ref 70–99)
POTASSIUM SERPL-SCNC: 3.5 MMOL/L (ref 3.4–5.3)
SODIUM SERPL-SCNC: 125 MMOL/L (ref 133–144)

## 2020-12-29 PROCEDURE — 250N000012 HC RX MED GY IP 250 OP 636 PS 637: Performed by: INTERNAL MEDICINE

## 2020-12-29 PROCEDURE — G0378 HOSPITAL OBSERVATION PER HR: HCPCS

## 2020-12-29 PROCEDURE — 250N000013 HC RX MED GY IP 250 OP 250 PS 637: Performed by: INTERNAL MEDICINE

## 2020-12-29 PROCEDURE — 97161 PT EVAL LOW COMPLEX 20 MIN: CPT | Mod: GP | Performed by: PHYSICAL THERAPIST

## 2020-12-29 PROCEDURE — 36415 COLL VENOUS BLD VENIPUNCTURE: CPT | Performed by: INTERNAL MEDICINE

## 2020-12-29 PROCEDURE — 97116 GAIT TRAINING THERAPY: CPT | Mod: GP | Performed by: PHYSICAL THERAPIST

## 2020-12-29 PROCEDURE — 99217 PR OBSERVATION CARE DISCHARGE: CPT | Performed by: PHYSICIAN ASSISTANT

## 2020-12-29 PROCEDURE — 80048 BASIC METABOLIC PNL TOTAL CA: CPT | Performed by: INTERNAL MEDICINE

## 2020-12-29 PROCEDURE — 97530 THERAPEUTIC ACTIVITIES: CPT | Mod: GP | Performed by: PHYSICAL THERAPIST

## 2020-12-29 RX ORDER — ALBUTEROL SULFATE 0.83 MG/ML
2.5 SOLUTION RESPIRATORY (INHALATION) EVERY 6 HOURS PRN
Qty: 100 ML | Refills: 5 | DISCHARGE
Start: 2020-12-29 | End: 2021-01-01

## 2020-12-29 RX ORDER — TRAMADOL HYDROCHLORIDE 50 MG/1
50 TABLET ORAL EVERY 6 HOURS PRN
Qty: 20 TABLET | Refills: 0 | Status: SHIPPED | OUTPATIENT
Start: 2020-12-29 | End: 2021-01-01

## 2020-12-29 RX ORDER — ACETAMINOPHEN 325 MG/1
1000 TABLET ORAL 3 TIMES DAILY
DISCHARGE
Start: 2020-12-29

## 2020-12-29 RX ORDER — POLYETHYLENE GLYCOL 3350 17 G/17G
17 POWDER, FOR SOLUTION ORAL DAILY PRN
DISCHARGE
Start: 2020-12-29 | End: 2021-01-01

## 2020-12-29 RX ORDER — LIDOCAINE 4 G/G
1 PATCH TOPICAL EVERY 24 HOURS
Qty: 10 PATCH | DISCHARGE
Start: 2020-12-29

## 2020-12-29 RX ORDER — AMOXICILLIN 250 MG
1 CAPSULE ORAL 2 TIMES DAILY PRN
DISCHARGE
Start: 2020-12-29 | End: 2021-01-01

## 2020-12-29 RX ADMIN — AMLODIPINE BESYLATE 7.5 MG: 5 TABLET ORAL at 08:08

## 2020-12-29 RX ADMIN — DOXYCYCLINE HYCLATE 100 MG: 100 CAPSULE ORAL at 08:08

## 2020-12-29 RX ADMIN — ATENOLOL 100 MG: 50 TABLET ORAL at 08:07

## 2020-12-29 RX ADMIN — ASPIRIN 81 MG: 81 TABLET ORAL at 08:07

## 2020-12-29 RX ADMIN — HYDROXYCHLOROQUINE SULFATE 200 MG: 200 TABLET, FILM COATED ORAL at 08:08

## 2020-12-29 RX ADMIN — IBUPROFEN 400 MG: 400 TABLET ORAL at 00:05

## 2020-12-29 RX ADMIN — LOSARTAN POTASSIUM 100 MG: 100 TABLET, FILM COATED ORAL at 08:07

## 2020-12-29 RX ADMIN — TRAMADOL HYDROCHLORIDE 50 MG: 50 TABLET, FILM COATED ORAL at 05:14

## 2020-12-29 RX ADMIN — TRAMADOL HYDROCHLORIDE 50 MG: 50 TABLET, FILM COATED ORAL at 12:18

## 2020-12-29 RX ADMIN — FERROUS SULFATE TAB 325 MG (65 MG ELEMENTAL FE) 325 MG: 325 (65 FE) TAB at 08:07

## 2020-12-29 RX ADMIN — PREDNISONE 5 MG: 5 TABLET ORAL at 08:08

## 2020-12-29 RX ADMIN — LEVOCARNITINE 990 MG: 330 TABLET ORAL at 08:07

## 2020-12-29 NOTE — PLAN OF CARE
PRIMARY DIAGNOSIS: GENERALIZED WEAKNESS    OUTPATIENT/OBSERVATION GOALS TO BE MET BEFORE DISCHARGE  1. Orthostatic performed: No    2. Tolerating PO medications: Yes    3. Return to near baseline physical activity: No    4. Cleared for discharge by consultants (if involved): No    Discharge Planner Nurse   Safe discharge environment identified: No-plan for possible TCU placement  Barriers to discharge: Yes       Entered by: Genia Mendoza 12/28/2020 10:48 PM   Patient alert and oriented, but forgetful at times. States she still feels weak. PT/SW ordered. Up with assist of 1 Gb/walker. PRN tramadol given for chronic back pain.   Please review provider order for any additional goals.   Nurse to notify provider when observation goals have been met and patient is ready for discharge.

## 2020-12-29 NOTE — PROGRESS NOTES
12/29/20 0953   Quick Adds   Quick Adds Certification   Type of Visit Initial PT Evaluation   Living Environment   People in home child(onelia), adult;grandchild(onelia);spouse   Current Living Arrangements house   Home Accessibility stairs to enter home   Number of Stairs, Main Entrance 2   Stair Railings, Main Entrance none   Transportation Anticipated family or friend will provide   Self-Care   Usual Activity Tolerance moderate   Current Activity Tolerance moderate   Regular Exercise No   Equipment Currently Used at Home walker, standard   Disability/Function   Walking or Climbing Stairs Difficulty yes   Walking or Climbing Stairs ambulation difficulty, requires equipment   Mobility Management Uses a walker   Fall history within last six months yes   Number of times patient has fallen within last six months 3   Change in Functional Status Since Onset of Current Illness/Injury yes   General Information   Onset of Illness/Injury or Date of Surgery 12/28/20   Referring Physician Bang Robertson MD   Patient/Family Therapy Goals Statement (PT) Discharge to TCU   Pertinent History of Current Problem (include personal factors and/or comorbidities that impact the POC) Admission 12/17-12/21 for UTI and hyponatremia causing generalized weakness.  Sodium levels improved, but ongoing weakness at home worse over the past couple of days.  Unable to stand on her own.  Failing at home despite assistance from family.  PT and OT home care had not got into the home yet due to being very busy per report.    Existing Precautions/Restrictions fall   Weight-Bearing Status - LLE full weight-bearing   Weight-Bearing Status - RLE full weight-bearing   Cognition   Orientation Status (Cognition) oriented x 4   Affect/Mental Status (Cognition) WFL   Follows Commands (Cognition) WFL   Pain Assessment   Patient Currently in Pain Yes, see Vital Sign flowsheet  (low back pain)   Integumentary/Edema   Integumentary/Edema Comments Multiple  brusises noted to all extremities due to multiple falls at home   Posture    Posture Forward head position;Protracted shoulders   Range of Motion (ROM)   ROM Comment WFL   Strength   Manual Muscle Testing Quick Adds Deficits observed during functional mobility   Bed Mobility   Comment (Bed Mobility) sit<>supine with modA at B LE   Transfers   Transfer Safety Comments sit<.stand with CGA   Gait/Stairs (Locomotion)   Comment (Gait/Stairs) CGA with FWW   Balance   Balance Comments Requires B UE support for safe dynamic mobiltiy   Sensory Examination   Sensory Perception patient reports no sensory changes   Coordination   Coordination no deficits were identified   Muscle Tone   Muscle Tone no deficits were identified   Clinical Impression   Criteria for Skilled Therapeutic Intervention yes, treatment indicated   PT Diagnosis (PT) Impaired functional mobility   Influenced by the following impairments Pain, weakness, deconditioning   Functional limitations due to impairments Difficulty with bed mobility, transfers, ambulation, stairs   Clinical Presentation Stable/Uncomplicated   Clinical Presentation Rationale medically stable, clear POC,    Clinical Decision Making (Complexity) low complexity   Therapy Frequency (PT) 4x/week   Predicted Duration of Therapy Intervention (days/wks) 5 days   Planned Therapy Interventions (PT) balance training;bed mobility training;gait training;home exercise program;patient/family education;stair training;strengthening;stretching;transfer training   Risk & Benefits of therapy have been explained evaluation/treatment results reviewed;care plan/treatment goals reviewed;risks/benefits reviewed;current/potential barriers reviewed;participants voiced agreement with care plan;participants included;patient   PT Discharge Planning    PT Discharge Recommendation (DC Rec) Transitional Care Facility   PT Rationale for DC Rec Pt discharged home after previous admission and failed despite having  supportive family. Rec TCU at this time to promote strength and activity tolerance to ensure pt's safety with mobility.    PT Brief overview of current status  [id number]   Therapy Certification   Start of care date 12/29/20   Certification date from 12/29/20   Certification date to 01/03/21   Medical Diagnosis Generalized weakness   Total Evaluation Time   Total Evaluation Time (Minutes) 5

## 2020-12-29 NOTE — PROGRESS NOTES
Your information has been submitted on December 29th, 2020 at 02:34:24 PM CST. The confirmation number is [id number]    Dennise Kaplan  Care Management Coordinator  [facility]  [phone]

## 2020-12-29 NOTE — PLAN OF CARE
ROOM # 225    Living Situation (if not independent, order SW consult): With daughter and daughters family  Facility name:  : Yady Robertson (daughter)    Activity level at baseline: ind, walker   Activity level on admit: Up with assist of 1 and walker       Patient registered to observation; given Patient Bill of Rights; given the opportunity to ask questions about observation status and their plan of care.  Patient has been oriented to the observation room, bathroom and call light is in place.    Discussed discharge goals and expectations with patient/family.

## 2020-12-29 NOTE — PLAN OF CARE
PRIMARY DIAGNOSIS: GENERALIZED WEAKNESS     OUTPATIENT/OBSERVATION GOALS TO BE MET BEFORE DISCHARGE  1. Orthostatic performed: No     2. Tolerating PO medications: Yes     3. Return to near baseline physical activity: No     4. Cleared for discharge by consultants (if involved): No     Discharge Planner Nurse   Safe discharge environment identified: No-plan for possible TCU placement  Barriers to discharge: Yes       Entered by: Genia Mendoza     Pt voiding without issues overnight. PT/SW ordered. Up with assist of 1 Gb/walker. PRN tramadol and tylenol given. Continue with current plan of care.     Please review provider order for any additional goals.   Nurse to notify provider when observation goals have been met and patient is ready for discharge.

## 2020-12-29 NOTE — PLAN OF CARE
Patient's After Visit Summary was reviewed with patient.   Patient verbalized understanding of After Visit Summary, recommended follow up and was given an opportunity to ask questions.   Discharge medications sent home with patient/family: Sent scripts to TCU  Discharged with HE via W/C.     OBSERVATION patient END time: 2:00 PM

## 2020-12-29 NOTE — PLAN OF CARE
PRIMARY DIAGNOSIS: GENERALIZED WEAKNESS     OUTPATIENT/OBSERVATION GOALS TO BE MET BEFORE DISCHARGE  1. Orthostatic performed: No     2. Tolerating PO medications: Yes     3. Return to near baseline physical activity: No     4. Cleared for discharge by consultants (if involved): No     Discharge Planner Nurse   Safe discharge environment identified: No-plan for possible TCU placement  Barriers to discharge: Yes       Entered by: Genia Mendoza     Pt voiding without issues overnight. PT/SW ordered. Up with assist of 1 Gb/walker. PRN tramadol and tylenol given    Please review provider order for any additional goals.   Nurse to notify provider when observation goals have been met and patient is ready for discharge.

## 2020-12-29 NOTE — DISCHARGE SUMMARY
[facility]  Hospitalist Discharge Summary      Date of Admission:  12/28/2020  Date of Discharge:  12/29/2020  Discharging Provider: Brenda Murillo PA-C      Discharge Diagnoses   Deconditioning  Back pain  L3 compression fx  RA  Hyponatremia  HTN    Follow-ups Needed After Discharge   Follow-up Appointments     Follow Up and recommended labs and tests      Follow up with primary care provider in 1-2 weeks. Recheck Na, consider   further work up if needed.  Lumbar corset when up out of bed.  1.2L fluid restriction             Unresulted Labs Ordered in the Past 30 Days of this Admission     Date and Time Order Name Status Description    12/28/2020 1008 Urine Culture Preliminary       These results will be followed up by PCP    Discharge Disposition   Discharged to rehabilitation facility  Condition at discharge: Stable  Patient ready to discharge to a skilled nursing facility as soon as possible in order to create capacity for patients related to the COVID-19 pandemic.    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 The patient was hospitalized from 12/17 through 12/21 for sodium level of 116 along with suspected UTI.  She was treated with IV ceftriaxone for positive urine culture growing Proteus.  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  Sodium level improved to 124 by discharge with 1.2 L fluid restriction.  Repeat levels up to 126 in clinic.  She has maintained the previous fluid restriction.  She had  some urinary retention requiring Crocker catheter last admission that was removed prior to discharge.  Does have some ongoing issues with retention after taking tramadol, but is eventually able to void.  No other change in her medications.Denies any fevers, chills, dysuria, nausea, vomiting, diarrhea, abdominal pain, flank pain, hematuria.  She continues to have central low mid back pain from her previous compression fracture and takes ibuprofen and tramadol for this.  Over the past few days she has been progressively weak to the point where she needs assistance getting up.  Earlier today she had to sit down while going to the car and she could not stand up.  Denies fall.  Despite family helping her at home she is unable to complete her ADLs. ED work up here has been unremarkable for acute process. UA is unremarkable, CXR clear. Na is low at 124 but stable compared to previous. 1.2L fluid restriction continued. PT consulted and recommended TCU, pt is agreeable. Recommend lumbar corset when up out of bed.     Generalized weakness/deconditioning: Admission 12/17-12/21 for UTI and hyponatremia causing generalized weakness.  Sodium levels improved, but ongoing weakness at home worse over the past couple of days.  Unable to stand on her own.  Failing at home despite assistance from family.  PT and OT home care had not got into the home yet due to being very busy per report.  Has some ongoing chronic hyponatremia as below although level similar to when she left and was feeling stronger, so I doubt this is contributing significantly.  Afebrile and no infectious symptoms. Urinalysis better and doubt UTI.    -Asymptomatic COVID-19 PCR and influenza PCR negative  -Consult SW and PT, recommend TCU     Chronic hyponatremia: Chronic low sodium around 130.  Acutely low at 116 during hospitalization 12/16/2020.  Nephrology was consulted.  Urine osmolality elevated at 345 and suspicion was for SIADH.  Nephrology was consulted.   Slowly improved up to 126 with 1.2 L fluid restriction which she has continued at home and sodium levels currently 125.  - Continue 1.2 L fluid restriction on discharge  - follow up with PCP for further work up or management      Recent UTI: Treated with 4 days of IV ceftriaxone for presumed UTI last admission.  UA then had 24 WBCs and large LE.  Urine culture grew Proteus.  UA now with 14 WBCs and small LE.  No fever or leukocytosis.  No infectious symptoms to suggest ongoing UTI.  abx not resumed.      Subacute L3 compression fracture: She had a significant fall roughly 2 weeks ago.  Has bruising on her left legs.  X-ray of the lumbar spine last week showed a age-indeterminate L3 compression fracture, but suspect acute then now subacute.  Ongoing back pain despite ibuprofen and tramadol 50 mg 3 times daily.  The tramadol does help with pain.  No radicular symptoms.  Has some urinary retention after tramadol.   -Continue acetaminophen, ibuprofen 400 mg, tramadol 50 mg as needed  -Patient had question regarding epidural steroid injection for this indication as she had one in her hip 2 weeks ago that was helpful.  I doubt this will be helpful for compression fracture, possibly detrimental to healing, but also has a component of arthritis flare which may benefit in the future. lumbar corset ordered, wear when up out of bed. If pain does not improve, consider follow up with Neurosurgery for further recommendations.     Urinary retention: Issues with urinary retention last admission requiring Crocker catheter placement that was removed prior to discharge.  Does have some ongoing retention after taking tramadol, but she is eventually able to void.  No other urinary symptoms.  -recommend bladder scan and intermittent straight cath as needed     RA, discoid lupus: Chronically on Plaquenil 200 mg twice daily and prednisone 5 mg daily.  Has been dealing with spine pain for the past couple of months, but this may be from the L3  compression fracture.  She denies any recent steroid bursts.  -Continue Plaquenil and prednisone.       Consultations This Hospital Stay   CARE MANAGEMENT / SOCIAL WORK IP CONSULT  PHYSICAL THERAPY ADULT IP CONSULT  CARE MANAGEMENT / SOCIAL WORK IP CONSULT  PHYSICAL THERAPY ADULT IP CONSULT  OCCUPATIONAL THERAPY ADULT IP CONSULT    Code Status   Full Code    Time Spent on this Encounter   IBrenda PA-C, personally saw the patient today and spent greater than 30 minutes discharging this patient.       Brenda Murillo PA-C  [facility] OBSERVATION DEPT  [address] [facility] [address]  Phone: [phone]  ______________________________________________________________________    Physical Exam   Vital Signs: Temp: 96.1  F (35.6  C) Temp src: Oral BP: (!) 159/75 Pulse: 95   Resp: 16 SpO2: 95 % O2 Device: None (Room air)    Weight: 0 lbs 0 oz    GENERAL:  Comfortable.  PSYCH: pleasant, oriented, No acute distress.  HEART:  Normal S1, S2 with no murmur, no pericardial rub, gallops or S3 or S4.  LUNGS:  Clear to auscultation, normal Respiratory effort. No wheezing, rales or ronchi.  GI:  Soft, normal bowel sounds. Non-tender, non distended.   EXTREMITIES:  Bruising noted to bilateral LE, +2 pulses bilateral and equal.  SKIN:  Dry to touch, No rash, wound or ulcerations.  NEUROLOGIC:  Grossly intact       Primary Care Physician   Yocasta Hernandez    Discharge Orders      General info for SNF    Length of Stay Estimate: Short Term Care: Estimated # of Days <30  Condition at Discharge: Stable  Level of care:skilled   Rehabilitation Potential: Good  Admission H&P remains valid and up-to-date: Yes  Recent Chemotherapy: N/A  Use Nursing Home Standing Orders: Yes     Mantoux instructions    Give two-step Mantoux (PPD) Per Facility Policy Yes     Reason for your hospital stay    Generalized weakness likely related to deconditioning, and low back pain due to L3 compression fx and arthritis  flare.  PT consulted and recommended TCU. Your sodium remains low but is stable. No acute infection noted on ED work up. SW consulted and assisted with discharge planning.     Bladder scan    X 2 for post void residual     Follow Up and recommended labs and tests    Follow up with primary care provider in 1-2 weeks. Recheck Na, consider further work up if needed.  Lumbar corset when up out of bed.  1.2L fluid restriction     Activity - Up with assistive device     Activity - Up with nursing assistance     Weight bearing status    As tolerated     Full Code     Physical Therapy Adult Consult    Evaluate and treat as clinically indicated.    Reason:  Back pain, L3 compression fx, deconditioning     Occupational Therapy Adult Consult    Evaluate and treat as clinically indicated.    Reason:  Back pain, L3 compression fx, deconditioning     Fall precautions     Pneumatic Compression Device     Bilateral calf. Remove 30 mins BID.     Advance Diet as Tolerated    Follow this diet upon discharge: Regular diet with 1200 mL fluid restriction       Significant Results and Procedures   Most Recent 3 CBC's:  Recent Labs   Lab Test 12/28/20  1054 12/20/20  0803 12/17/20  1456   WBC 9.2 7.3 9.3   HGB 13.1 12.8 12.4   MCV 95 93 91    311 302     Most Recent 3 BMP's:  Recent Labs   Lab Test 12/29/20  0544 12/28/20  1054 12/24/20  1315   * 125* 124*   POTASSIUM 3.5 3.6 4.2   CHLORIDE 90* 90* 89*   CO2 33* 30 31   BUN 6* 8 11   CR 0.42* 0.48* 0.58   ANIONGAP 2* 5 4   KRISTOPHER 7.8* 8.4* 8.7   GLC 84 138* 123*     Most Recent Urinalysis:  Recent Labs   Lab Test 12/28/20  1229 12/14/12  1153 12/14/12  1153   COLOR Light Yellow   < > Yellow   APPEARANCE Clear   < > Clear   URINEGLC Negative   < > Negative   URINEBILI Negative   < > Negative   URINEKETONE Negative   < > Negative   SG 1.015   < > 1.020   UBLD Negative   < > Trace*   URINEPH 7.0   < > 6.5   PROTEIN 50*   < > Negative   UROBILINOGEN  --   --  0.2   NITRITE Negative    < > Negative   LEUKEST Small*   < > Negative   RBCU 1   < > 2-5*   WBCU 14*   < > O - 2    < > = values in this interval not displayed.   ,   Results for orders placed or performed in visit on 12/16/20   XR Lumbar Spine 2/3 Views    Narrative    LUMBAR SPINE TWO-THREE  VIEWS  12/16/2020 2:45 PM     HISTORY: Acute midline low back pain without sciatica    COMPARISON: Abdominal CT dated 12/15/2020    FINDINGS: There is a compression fracture of the superior endplate of  L3. It is difficult to determine the age of this fracture. This  fracture was present on the abdominal CT scan of 12/15/2020 and has  not changed..  There is moderate, 40% loss of mid vertebral body  height. There are degenerative changes present with loss of disc space  height at L1-L2, L2-L3, L4-L5, and L5-S1. Degenerative change in the  facet joints.      Impression    IMPRESSION:   1. Age-indeterminate compression fracture of the superior endplate of  L3  2. Multilevel degenerative changes.    MIROSLAVA ARCE MD       Discharge Medications   Current Discharge Medication List      START taking these medications    Details   acetaminophen (TYLENOL) 325 MG tablet Take 3 tablets (975 mg) by mouth 3 times daily    Associated Diagnoses: Closed compression fracture of L3 lumbar vertebra, initial encounter (H); Bilateral low back pain without sciatica, unspecified chronicity      polyethylene glycol (MIRALAX) 17 g packet Take 17 g by mouth daily as needed for constipation    Associated Diagnoses: Bilateral low back pain without sciatica, unspecified chronicity      senna-docusate (SENOKOT-S/PERICOLACE) 8.6-50 MG tablet Take 1 tablet by mouth 2 times daily as needed for constipation  Qty:      Associated Diagnoses: Bilateral low back pain without sciatica, unspecified chronicity         CONTINUE these medications which have CHANGED    Details   traMADol (ULTRAM) 50 MG tablet Take 1 tablet (50 mg) by mouth every 6 hours as needed for moderate pain  Qty: 20  tablet, Refills: 0    Associated Diagnoses: Closed compression fracture of L3 lumbar vertebra, initial encounter (H); Bilateral low back pain without sciatica, unspecified chronicity         CONTINUE these medications which have NOT CHANGED    Details   acetylcysteine (N-ACETYL CYSTEINE) 600 MG CAPS capsule Take 1 capsule (600 mg) by mouth daily      albuterol (2.5 MG/3ML) 0.083% nebulizer solution Take 1 vial (2.5 mg) by nebulization every 6 hours as needed  Qty: 100 mL, Refills: 5    Associated Diagnoses: Bronchitis with bronchospasm      amLODIPine (NORVASC) 5 MG tablet TAKE 1 & 1/2 (ONE & ONE-HALF) TABLETS BY MOUTH ONCE DAILY  Qty: 135 tablet, Refills: 0    Associated Diagnoses: Essential hypertension, benign      aspirin 81 MG tablet Take 1 tablet (81 mg) by mouth daily  Qty: 30 tablet      atenolol (TENORMIN) 100 MG tablet Take 1 tablet (100 mg) by mouth daily  Qty: 90 tablet, Refills: 3    Associated Diagnoses: Essential hypertension, benign      cholecalciferol (VITAMIN D3) 1000 UNIT tablet Take 1 tablet (1,000 Units) by mouth daily  Qty: 100 tablet, Refills: 3      doxycycline (VIBRAMYCIN) 100 MG capsule Take 1 capsule by mouth 2 times daily.  Qty: 180 capsule, Refills: 1    Associated Diagnoses: Septic arthritis (H)      ferrous sulfate (IRON) 325 (65 Fe) MG tablet Take 1 tablet (325 mg) by mouth daily (with breakfast)  Qty: 30 tablet, Refills: 2      Glucosamine 500 MG CAPS Take 1 capful by mouth daily.      hydroxychloroquine (PLAQUENIL) 200 MG tablet Take 1 tablet by mouth 2 times daily.  Qty: 60 tablet, Refills: 1      levOCARNitine (CARNITOR) 330 MG tablet Take 990 mg by mouth 2 times daily       lidocaine (LIDODERM) 5 % patch Place 1 patch onto the skin every 24 hours for 10 days To prevent lidocaine toxicity, patient should be patch free for 12 hrs daily.  Qty: 10 patch, Refills: 0    Associated Diagnoses: Bilateral low back pain without sciatica, unspecified chronicity      losartan (COZAAR) 100 MG  tablet Take 1 tablet (100 mg) by mouth daily  Qty: 90 tablet, Refills: 3    Associated Diagnoses: Essential hypertension, benign      predniSONE (DELTASONE) 5 MG tablet Take 5 mg by mouth daily       albuterol (PROAIR HFA, PROVENTIL HFA, VENTOLIN HFA) 108 (90 BASE) MCG/ACT inhaler Inhale 2 puffs into the lungs every 6 hours as needed for shortness of breath / dyspnea  Qty: 1 Inhaler, Refills: 5    Associated Diagnoses: Cough      IBANdronate (BONIVA) 150 MG tablet Take 1 tablet (150 mg) by mouth every 30 days Take 60 minutes before am meal with 8 oz. water. Remain upright for 60 minutes.  Qty: 3 tablet, Refills: 3    Associated Diagnoses: Osteopenia, unspecified location           Allergies   Allergies   Allergen Reactions     Ace Inhibitors Cough     Latex GI Disturbance     lip swelling     Liquid Adhesive Rash     Tape [Adhesive Tape] Rash

## 2020-12-29 NOTE — PLAN OF CARE
PRIMARY DIAGNOSIS: GENERALIZED WEAKNESS    OUTPATIENT/OBSERVATION GOALS TO BE MET BEFORE DISCHARGE  1. Orthostatic performed: No    2. Tolerating PO medications: Yes    3. Return to near baseline physical activity: No    4. Cleared for discharge by consultants (if involved): No    Discharge Planner Nurse   Safe discharge environment identified: No  Barriers to discharge: Yes       Entered by: Janet Quan 12/29/2020      Please review provider order for any additional goals.   Nurse to notify provider when observation goals have been met and patient is ready for discharge.

## 2020-12-29 NOTE — CONSULTS
Care Management Discharge Note    Discharge Date: 12/29/20.     Discharge Disposition: [facility], prvt room, today.     Discharge Transportation: Reviewed out of pocket cost for  [facility] transport, $75 for base rate and $5 per mile to the destination. Pt/family expressed understanding and are agreeable to this.     Private pay costs discussed: transportation costs.    PAS Confirmation Code:  Being completed.   Patient/family educated on Medicare website which has current facility and service quality ratings: Yes.     Education Provided on the Discharge Plan:  Yes.   Persons Notified of Discharge Plans: Pt,  [facility] admissions, Daughters Lynne and Yady.  Patient/Family in Agreement with the Plan: yes    Additional Information:   [facility], today. PAS being completed. Discharge orders faxed. [facility] WC transport arranged for today at 1400. SW will continue to follow.     1220: Discharge orders faxed.     WILFRED Tony

## 2020-12-29 NOTE — TELEPHONE ENCOUNTER
Controlled Substance Refill Request for Tramadol  Problem List Complete:  No     PROVIDER TO CONSIDER COMPLETION OF PROBLEM LIST AND OVERVIEW/CONTROLLED SUBSTANCE AGREEMENT    Last Written Prescription Date:  12/29/20--hospital discharge  Last Fill Quantity: 20,   # refills: 0    THE MOST RECENT OFFICE VISIT MUST BE WITHIN THE PAST 3 MONTHS. AT LEAST ONE FACE TO FACE VISIT MUST OCCUR EVERY 6 MONTHS. ADDITIONAL VISITS CAN BE VIRTUAL.  (THIS STATEMENT SHOULD BE DELETED.)    Last Office Visit with [facility] primary care provider: 12/4/20    Future Office visit:     Controlled substance agreement:   Encounter-Level CSA:    There are no encounter-level csa.     Patient-Level CSA:    There are no patient-level csa.         Last Urine Drug Screen: No results found for: CDAUT, No results found for: COMDAT, No results found for: THC13, PCP13, COC13, MAMP13, OPI13, AMP13, BZO13, TCA13, MTD13, BAR13, OXY13, PPX13, BUP13     Processing:  Rx to be electronically transmitted to pharmacy by provider      [email or web address]       checked in past 3 months?  Yes 12/29/20  Last filled 12/4/20 #10 tabs.    Please advise, thanks.

## 2020-12-29 NOTE — PLAN OF CARE
PRIMARY DIAGNOSIS: GENERALIZED WEAKNESS    OUTPATIENT/OBSERVATION GOALS TO BE MET BEFORE DISCHARGE  1. Orthostatic performed: No    2. Tolerating PO medications: Yes    3. Return to near baseline physical activity: No    4. Cleared for discharge by consultants (if involved): yes     Patient is alert and oriented x4. VSS. Rating pain 8 out of 10, PRN tramadol given. Na 125, Chloride 90 this AM. SL. Regular diet with 1.2 L fluid restriction. Strict I&O. PT and SW consulted. Up with 1 and walker. TCU today at 2 pm via HE.     Discharge Planner Nurse   Safe discharge environment identified: No  Barriers to discharge: Yes       Entered by: Janet Quan 12/29/2020      Please review provider order for any additional goals.   Nurse to notify provider when observation goals have been met and patient is ready for discharge.

## 2020-12-29 NOTE — PROGRESS NOTES
[facility]      OUTPATIENT PHYSICAL THERAPY EVALUATION  PLAN OF TREATMENT FOR OUTPATIENT REHABILITATION  (COMPLETE FOR INITIAL CLAIMS ONLY)  Patient's Last Name, First Name, M.I.  YOB: 1940  Nicolás Wyman                        Provider's Name  [facility] Medical Record No.  [id number]                               Onset Date:  12/28/20   Start of Care Date:  12/29/20      Type:     _X_PT   ___OT   ___SLP Medical Diagnosis:  Generalized weakness                        PT Diagnosis:  Impaired functional mobility   Visits from SOC:  1   _________________________________________________________________________________  Plan of Treatment/Functional Goals    Planned Interventions: balance training, bed mobility training, gait training, home exercise program, patient/family education, stair training, strengthening, stretching, transfer training     Goals: See Physical Therapy Goals on Care Plan in [employer] electronic health record.    Therapy Frequency: 4x/week  Predicted Duration of Therapy Intervention: 5 days  _________________________________________________________________________________    I CERTIFY THE NEED FOR THESE SERVICES FURNISHED UNDER        THIS PLAN OF TREATMENT AND WHILE UNDER MY CARE     (Physician co-signature of this document indicates review and certification of the therapy plan).                Certification date from: 12/29/20, Certification date to: 01/03/21    Referring Physician: Bang Robertson MD            Initial Assessment        See Physical Therapy evaluation dated 12/29/20 in Epic electronic health record.

## 2020-12-30 LAB
BACTERIA SPEC CULT: ABNORMAL
BACTERIA SPEC CULT: ABNORMAL
Lab: ABNORMAL
SPECIMEN SOURCE: ABNORMAL

## 2020-12-30 NOTE — PLAN OF CARE
Physical Therapy Discharge Summary     Reason for therapy discharge:    Discharged to transitional care facility.     Progress towards therapy goal(s). See goals on Care Plan in [facility] electronic health record for goal details.  Goals not met.  Barriers to achieving goals:   limited tolerance for therapy.     Therapy recommendation(s):    Continued therapy is recommended.  Rationale/Recommendations:  Pt is below baseline with functional mobility and strength and would benefit from continued PT to progress skills.

## 2020-12-31 RX ORDER — TRAMADOL HYDROCHLORIDE 50 MG/1
TABLET ORAL
Qty: 30 TABLET | Refills: 0 | OUTPATIENT
Start: 2020-12-31

## 2020-12-31 NOTE — TELEPHONE ENCOUNTER
Please find out from the patient how many she is taking per day and how many she has left the prescription done 2 days ago.

## 2020-12-31 NOTE — TELEPHONE ENCOUNTER
This request was sent on 12/26 prior to admission to the hospital.  Rx was filled by hospital and patient is now at a TCU.  Will close this encounter at this time as patient is managed by TCU at this time.  Elisa Carpenter RN

## 2021-01-01 ENCOUNTER — TELEPHONE (OUTPATIENT)
Dept: GERIATRICS | Facility: CLINIC | Age: 81
End: 2021-01-01

## 2021-01-01 ENCOUNTER — APPOINTMENT (OUTPATIENT)
Dept: PHYSICAL THERAPY | Facility: CLINIC | Age: 81
End: 2021-01-01
Attending: INTERNAL MEDICINE
Payer: COMMERCIAL

## 2021-01-01 ENCOUNTER — MEDICAL CORRESPONDENCE (OUTPATIENT)
Dept: HEALTH INFORMATION MANAGEMENT | Facility: CLINIC | Age: 81
End: 2021-01-01

## 2021-01-01 ENCOUNTER — APPOINTMENT (OUTPATIENT)
Dept: CT IMAGING | Facility: CLINIC | Age: 81
End: 2021-01-01
Attending: EMERGENCY MEDICINE
Payer: COMMERCIAL

## 2021-01-01 ENCOUNTER — TELEPHONE (OUTPATIENT)
Dept: INTERNAL MEDICINE | Facility: CLINIC | Age: 81
End: 2021-01-01

## 2021-01-01 ENCOUNTER — APPOINTMENT (OUTPATIENT)
Dept: GENERAL RADIOLOGY | Facility: CLINIC | Age: 81
End: 2021-01-01
Attending: EMERGENCY MEDICINE
Payer: COMMERCIAL

## 2021-01-01 ENCOUNTER — NURSING HOME VISIT (OUTPATIENT)
Dept: GERIATRICS | Facility: CLINIC | Age: 81
End: 2021-01-01
Payer: COMMERCIAL

## 2021-01-01 ENCOUNTER — MYC MEDICAL ADVICE (OUTPATIENT)
Dept: FAMILY MEDICINE | Facility: CLINIC | Age: 81
End: 2021-01-01

## 2021-01-01 ENCOUNTER — TRANSFERRED RECORDS (OUTPATIENT)
Dept: HEALTH INFORMATION MANAGEMENT | Facility: CLINIC | Age: 81
End: 2021-01-01

## 2021-01-01 ENCOUNTER — OFFICE VISIT (OUTPATIENT)
Dept: GERIATRICS | Facility: CLINIC | Age: 81
End: 2021-01-01
Payer: COMMERCIAL

## 2021-01-01 ENCOUNTER — LAB REQUISITION (OUTPATIENT)
Dept: LAB | Facility: CLINIC | Age: 81
End: 2021-01-01
Payer: COMMERCIAL

## 2021-01-01 ENCOUNTER — VIRTUAL VISIT (OUTPATIENT)
Dept: FAMILY MEDICINE | Facility: CLINIC | Age: 81
End: 2021-01-01
Payer: COMMERCIAL

## 2021-01-01 ENCOUNTER — TELEPHONE (OUTPATIENT)
Dept: GERIATRICS | Facility: CLINIC | Age: 81
End: 2021-01-01
Payer: COMMERCIAL

## 2021-01-01 ENCOUNTER — MEDICAL CORRESPONDENCE (OUTPATIENT)
Dept: HEALTH INFORMATION MANAGEMENT | Facility: CLINIC | Age: 81
End: 2021-01-01
Payer: COMMERCIAL

## 2021-01-01 ENCOUNTER — RECORDS - HEALTHEAST (OUTPATIENT)
Dept: ADMINISTRATIVE | Facility: CLINIC | Age: 81
End: 2021-01-01

## 2021-01-01 ENCOUNTER — APPOINTMENT (OUTPATIENT)
Dept: PHYSICAL THERAPY | Facility: CLINIC | Age: 81
End: 2021-01-01
Payer: COMMERCIAL

## 2021-01-01 ENCOUNTER — HOSPITAL ENCOUNTER (OUTPATIENT)
Facility: CLINIC | Age: 81
Setting detail: OBSERVATION
Discharge: NURSING, REHAB OR LONG-TERM CARE | End: 2021-08-27
Attending: EMERGENCY MEDICINE | Admitting: INTERNAL MEDICINE
Payer: COMMERCIAL

## 2021-01-01 ENCOUNTER — HEALTH MAINTENANCE LETTER (OUTPATIENT)
Age: 81
End: 2021-01-01

## 2021-01-01 VITALS
SYSTOLIC BLOOD PRESSURE: 158 MMHG | RESPIRATION RATE: 18 BRPM | HEART RATE: 99 BPM | HEIGHT: 59 IN | BODY MASS INDEX: 32.98 KG/M2 | OXYGEN SATURATION: 94 % | TEMPERATURE: 97.1 F | OXYGEN SATURATION: 90 % | HEART RATE: 96 BPM | DIASTOLIC BLOOD PRESSURE: 77 MMHG | TEMPERATURE: 97.2 F | WEIGHT: 163.6 LBS | DIASTOLIC BLOOD PRESSURE: 69 MMHG | SYSTOLIC BLOOD PRESSURE: 137 MMHG

## 2021-01-01 VITALS
RESPIRATION RATE: 18 BRPM | SYSTOLIC BLOOD PRESSURE: 127 MMHG | BODY MASS INDEX: 33.69 KG/M2 | HEART RATE: 79 BPM | HEIGHT: 59 IN | TEMPERATURE: 97.5 F | WEIGHT: 167.1 LBS | DIASTOLIC BLOOD PRESSURE: 62 MMHG | OXYGEN SATURATION: 95 %

## 2021-01-01 VITALS
BODY MASS INDEX: 33.55 KG/M2 | HEIGHT: 59 IN | SYSTOLIC BLOOD PRESSURE: 168 MMHG | WEIGHT: 166.4 LBS | DIASTOLIC BLOOD PRESSURE: 63 MMHG | OXYGEN SATURATION: 92 % | RESPIRATION RATE: 18 BRPM | HEART RATE: 89 BPM | TEMPERATURE: 97.2 F

## 2021-01-01 VITALS
WEIGHT: 167.2 LBS | DIASTOLIC BLOOD PRESSURE: 86 MMHG | OXYGEN SATURATION: 92 % | BODY MASS INDEX: 33.71 KG/M2 | HEART RATE: 84 BPM | SYSTOLIC BLOOD PRESSURE: 176 MMHG | RESPIRATION RATE: 18 BRPM | HEIGHT: 59 IN | TEMPERATURE: 97.2 F

## 2021-01-01 VITALS
HEIGHT: 59 IN | DIASTOLIC BLOOD PRESSURE: 70 MMHG | HEART RATE: 94 BPM | RESPIRATION RATE: 17 BRPM | BODY MASS INDEX: 32.46 KG/M2 | TEMPERATURE: 96.8 F | OXYGEN SATURATION: 93 % | WEIGHT: 161 LBS | SYSTOLIC BLOOD PRESSURE: 139 MMHG

## 2021-01-01 VITALS
HEIGHT: 59 IN | OXYGEN SATURATION: 92 % | SYSTOLIC BLOOD PRESSURE: 136 MMHG | HEART RATE: 75 BPM | TEMPERATURE: 97 F | BODY MASS INDEX: 36.29 KG/M2 | WEIGHT: 180 LBS | DIASTOLIC BLOOD PRESSURE: 48 MMHG | RESPIRATION RATE: 16 BRPM

## 2021-01-01 VITALS — BODY MASS INDEX: 34.88 KG/M2 | WEIGHT: 173 LBS | RESPIRATION RATE: 17 BRPM | HEIGHT: 59 IN

## 2021-01-01 DIAGNOSIS — I10 ESSENTIAL HYPERTENSION, BENIGN: Primary | ICD-10-CM

## 2021-01-01 DIAGNOSIS — D64.9 ANEMIA, UNSPECIFIED: ICD-10-CM

## 2021-01-01 DIAGNOSIS — J84.10 PULMONARY INTERSTITIAL FIBROSIS (H): Primary | ICD-10-CM

## 2021-01-01 DIAGNOSIS — I10 ESSENTIAL HYPERTENSION: ICD-10-CM

## 2021-01-01 DIAGNOSIS — S32.000S LUMBAR COMPRESSION FRACTURE, SEQUELA: ICD-10-CM

## 2021-01-01 DIAGNOSIS — R09.02 HYPOXIA: ICD-10-CM

## 2021-01-01 DIAGNOSIS — G47.00 INSOMNIA, UNSPECIFIED TYPE: ICD-10-CM

## 2021-01-01 DIAGNOSIS — R33.9 URINARY RETENTION: ICD-10-CM

## 2021-01-01 DIAGNOSIS — M48.061 SPINAL STENOSIS OF LUMBAR REGION, UNSPECIFIED WHETHER NEUROGENIC CLAUDICATION PRESENT: ICD-10-CM

## 2021-01-01 DIAGNOSIS — M48.061 SPINAL STENOSIS OF LUMBAR REGION WITHOUT NEUROGENIC CLAUDICATION: ICD-10-CM

## 2021-01-01 DIAGNOSIS — M62.81 GENERALIZED MUSCLE WEAKNESS: Primary | ICD-10-CM

## 2021-01-01 DIAGNOSIS — T07.XXXA MULTIPLE BRUISES: ICD-10-CM

## 2021-01-01 DIAGNOSIS — M06.9 RHEUMATOID ARTHRITIS INVOLVING MULTIPLE SITES, UNSPECIFIED WHETHER RHEUMATOID FACTOR PRESENT (H): ICD-10-CM

## 2021-01-01 DIAGNOSIS — M80.00XS OSTEOPOROSIS WITH CURRENT PATHOLOGICAL FRACTURE, UNSPECIFIED OSTEOPOROSIS TYPE, SEQUELA: ICD-10-CM

## 2021-01-01 DIAGNOSIS — G47.33 OSA (OBSTRUCTIVE SLEEP APNEA): ICD-10-CM

## 2021-01-01 DIAGNOSIS — M00.9 SEPTIC ARTHRITIS (H): ICD-10-CM

## 2021-01-01 DIAGNOSIS — M06.9 RHEUMATOID ARTHRITIS INVOLVING MULTIPLE SITES, UNSPECIFIED WHETHER RHEUMATOID FACTOR PRESENT (H): Primary | ICD-10-CM

## 2021-01-01 DIAGNOSIS — E22.2 SIADH (SYNDROME OF INAPPROPRIATE ADH PRODUCTION) (H): ICD-10-CM

## 2021-01-01 DIAGNOSIS — J84.10 PULMONARY INTERSTITIAL FIBROSIS (H): ICD-10-CM
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
## 2021-01-01 DIAGNOSIS — E66.01 MORBID OBESITY, UNSPECIFIED OBESITY TYPE (H): ICD-10-CM

## 2021-01-01 DIAGNOSIS — E87.6 HYPOKALEMIA: ICD-10-CM

## 2021-01-01 DIAGNOSIS — B02.7 DISSEMINATED HERPES ZOSTER: Primary | ICD-10-CM

## 2021-01-01 DIAGNOSIS — E55.9 VITAMIN D DEFICIENCY, UNSPECIFIED: ICD-10-CM

## 2021-01-01 DIAGNOSIS — E22.2 SYNDROME OF INAPPROPRIATE SECRETION OF ANTIDIURETIC HORMONE (H): ICD-10-CM

## 2021-01-01 DIAGNOSIS — G89.28 CHRONIC KNEE PAIN AFTER TOTAL REPLACEMENT OF BOTH KNEE JOINTS: Primary | ICD-10-CM

## 2021-01-01 DIAGNOSIS — K59.01 SLOW TRANSIT CONSTIPATION: ICD-10-CM

## 2021-01-01 DIAGNOSIS — M25.562 CHRONIC KNEE PAIN AFTER TOTAL REPLACEMENT OF BOTH KNEE JOINTS: ICD-10-CM

## 2021-01-01 DIAGNOSIS — F41.9 ANXIETY: Primary | ICD-10-CM

## 2021-01-01 DIAGNOSIS — E87.1 HYPONATREMIA: ICD-10-CM

## 2021-01-01 DIAGNOSIS — Z85.72 HISTORY OF NON-HODGKIN'S LYMPHOMA: ICD-10-CM

## 2021-01-01 DIAGNOSIS — R30.0 DYSURIA: ICD-10-CM

## 2021-01-01 DIAGNOSIS — L08.9 PUSTULAR LESION: ICD-10-CM

## 2021-01-01 DIAGNOSIS — M25.562 CHRONIC KNEE PAIN AFTER TOTAL REPLACEMENT OF BOTH KNEE JOINTS: Primary | ICD-10-CM

## 2021-01-01 DIAGNOSIS — I10 ESSENTIAL (PRIMARY) HYPERTENSION: ICD-10-CM

## 2021-01-01 DIAGNOSIS — M06.9 RHEUMATOID ARTHRITIS, UNSPECIFIED (H): ICD-10-CM
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
## 2021-01-01 DIAGNOSIS — Z53.9 DIAGNOSIS NOT YET DEFINED: Primary | ICD-10-CM

## 2021-01-01 DIAGNOSIS — G89.28 CHRONIC KNEE PAIN AFTER TOTAL REPLACEMENT OF BOTH KNEE JOINTS: ICD-10-CM

## 2021-01-01 DIAGNOSIS — E03.9 HYPOTHYROIDISM, UNSPECIFIED: ICD-10-CM

## 2021-01-01 DIAGNOSIS — M25.561 CHRONIC KNEE PAIN AFTER TOTAL REPLACEMENT OF BOTH KNEE JOINTS: ICD-10-CM

## 2021-01-01 DIAGNOSIS — Z96.653 CHRONIC KNEE PAIN AFTER TOTAL REPLACEMENT OF BOTH KNEE JOINTS: ICD-10-CM

## 2021-01-01 DIAGNOSIS — D64.9 ANEMIA, UNSPECIFIED TYPE: ICD-10-CM

## 2021-01-01 DIAGNOSIS — M25.561 CHRONIC KNEE PAIN AFTER TOTAL REPLACEMENT OF BOTH KNEE JOINTS: Primary | ICD-10-CM

## 2021-01-01 DIAGNOSIS — I10 ESSENTIAL HYPERTENSION, BENIGN: ICD-10-CM

## 2021-01-01 DIAGNOSIS — L93.0 DISCOID LUPUS: ICD-10-CM

## 2021-01-01 DIAGNOSIS — R09.81 NASAL CONGESTION: ICD-10-CM

## 2021-01-01 DIAGNOSIS — R53.81 PHYSICAL DECONDITIONING: ICD-10-CM

## 2021-01-01 DIAGNOSIS — H40.89 OTHER SPECIFIED GLAUCOMA, UNSPECIFIED LATERALITY: ICD-10-CM

## 2021-01-01 DIAGNOSIS — M48.061 SPINAL STENOSIS OF LUMBAR REGION WITHOUT NEUROGENIC CLAUDICATION: Primary | ICD-10-CM

## 2021-01-01 DIAGNOSIS — Z96.653 CHRONIC KNEE PAIN AFTER TOTAL REPLACEMENT OF BOTH KNEE JOINTS: Primary | ICD-10-CM

## 2021-01-01 DIAGNOSIS — C85.90 NON-HODGKIN'S LYMPHOMA, UNSPECIFIED BODY REGION, UNSPECIFIED NON-HODGKIN LYMPHOMA TYPE (H): ICD-10-CM

## 2021-01-01 LAB
ALBUMIN SERPL-MCNC: 3.1 G/DL (ref 3.5–5)
ALBUMIN SERPL-MCNC: 3.2 G/DL (ref 3.4–5)
ALBUMIN UR-MCNC: 30 MG/DL
ALBUMIN UR-MCNC: 30 MG/DL
ALP SERPL-CCNC: 44 U/L (ref 40–150)
ALP SERPL-CCNC: 46 U/L (ref 45–120)
ALT SERPL W P-5'-P-CCNC: 15 U/L (ref 0–45)
ALT SERPL W P-5'-P-CCNC: 36 U/L (ref 0–50)
AMORPH CRY #/AREA URNS HPF: ABNORMAL /HPF
ANION GAP SERPL CALC-SCNC: 7 MMOL/L (ref 7–16)
ANION GAP SERPL CALC-SCNC: 8 MMOL/L (ref 7–16)
ANION GAP SERPL CALCULATED.3IONS-SCNC: 10 MMOL/L (ref 5–18)
ANION GAP SERPL CALCULATED.3IONS-SCNC: 4 MMOL/L (ref 3–14)
ANION GAP SERPL CALCULATED.3IONS-SCNC: 6 MMOL/L (ref 3–14)
ANION GAP SERPL CALCULATED.3IONS-SCNC: 6 MMOL/L (ref 5–18)
ANION GAP SERPL CALCULATED.3IONS-SCNC: 7 MMOL/L (ref 3–14)
ANION GAP SERPL CALCULATED.3IONS-SCNC: <1 MMOL/L (ref 3–14)
APPEARANCE UR: ABNORMAL
APPEARANCE UR: CLEAR
AST SERPL W P-5'-P-CCNC: 18 U/L (ref 0–40)
AST SERPL W P-5'-P-CCNC: 18 U/L (ref 0–45)
ATRIAL RATE - MUSE: 66 BPM
BACTERIA #/AREA URNS HPF: ABNORMAL /HPF
BACTERIA UR CULT: ABNORMAL
BASOPHILS # BLD AUTO: 0 10E3/UL (ref 0–0.2)
BASOPHILS NFR BLD AUTO: 0 %
BILIRUB SERPL-MCNC: 0.5 MG/DL (ref 0–1)
BILIRUB SERPL-MCNC: 0.6 MG/DL (ref 0.2–1.3)
BILIRUB UR QL STRIP: NEGATIVE
BILIRUB UR QL STRIP: NEGATIVE
BUN SERPL-MCNC: 5 MG/DL (ref 7–26)
BUN SERPL-MCNC: 6 MG/DL (ref 8–28)
BUN SERPL-MCNC: 7 MG/DL (ref 7–30)
BUN SERPL-MCNC: 7 MG/DL (ref 7–30)
BUN SERPL-MCNC: 7 MG/DL (ref 8–28)
BUN SERPL-MCNC: 8 MG/DL (ref 7–30)
BUN SERPL-MCNC: 9 MG/DL (ref 7–30)
BUN SERPL-MCNC: <5 MG/DL (ref 7–26)
CALCIUM (EXTERNAL): 8.4 MG/DL (ref 8.4–10.4)
CALCIUM (EXTERNAL): 8.5 MG/DL (ref 8.4–10.4)
CALCIUM SERPL-MCNC: 8.4 MG/DL (ref 8.5–10.1)
CALCIUM SERPL-MCNC: 8.4 MG/DL (ref 8.5–10.1)
CALCIUM SERPL-MCNC: 8.5 MG/DL (ref 8.5–10.5)
CALCIUM SERPL-MCNC: 8.6 MG/DL (ref 8.5–10.1)
CALCIUM SERPL-MCNC: 8.8 MG/DL (ref 8.5–10.1)
CALCIUM SERPL-MCNC: 8.9 MG/DL (ref 8.5–10.5)
CHLORIDE (EXTERNAL): 94 MMOL/L (ref 98–109)
CHLORIDE (EXTERNAL): 95 MMOL/L (ref 98–109)
CHLORIDE BLD-SCNC: 83 MMOL/L (ref 94–109)
CHLORIDE BLD-SCNC: 84 MMOL/L (ref 98–107)
CHLORIDE BLD-SCNC: 86 MMOL/L (ref 98–107)
CHLORIDE BLD-SCNC: 87 MMOL/L (ref 94–109)
CHLORIDE BLD-SCNC: 92 MMOL/L (ref 94–109)
CHLORIDE BLD-SCNC: 94 MMOL/L (ref 94–109)
CO2 (EXTERNAL): 29 MMOL/L (ref 20–29)
CO2 (EXTERNAL): 32 MMOL/L (ref 20–29)
CO2 SERPL-SCNC: 30 MMOL/L (ref 22–31)
CO2 SERPL-SCNC: 31 MMOL/L (ref 20–32)
CO2 SERPL-SCNC: 32 MMOL/L (ref 20–32)
CO2 SERPL-SCNC: 32 MMOL/L (ref 20–32)
CO2 SERPL-SCNC: 33 MMOL/L (ref 22–31)
CO2 SERPL-SCNC: 36 MMOL/L (ref 20–32)
COLOR UR AUTO: YELLOW
COLOR UR AUTO: YELLOW
CREAT SERPL-MCNC: 0.42 MG/DL (ref 0.52–1.04)
CREAT SERPL-MCNC: 0.43 MG/DL (ref 0.52–1.04)
CREAT SERPL-MCNC: 0.44 MG/DL (ref 0.52–1.04)
CREAT SERPL-MCNC: 0.5 MG/DL (ref 0.6–1.1)
CREAT SERPL-MCNC: 0.53 MG/DL (ref 0.52–1.04)
CREAT SERPL-MCNC: 0.55 MG/DL (ref 0.6–1.1)
CREATININE (EXTERNAL): 0.34 MG/DL (ref 0.55–1.02)
CREATININE (EXTERNAL): 0.35 MG/DL (ref 0.55–1.02)
DEPRECATED CALCIDIOL+CALCIFEROL SERPL-MC: 40 UG/L (ref 30–80)
DIASTOLIC BLOOD PRESSURE - MUSE: NORMAL MMHG
DIFFERENTIAL: ABNORMAL
EOSINOPHIL # BLD AUTO: 0.2 10E3/UL (ref 0–0.7)
EOSINOPHIL NFR BLD AUTO: 3 %
ERYTHROCYTE [DISTWIDTH] IN BLOOD BY AUTOMATED COUNT: 14 % (ref 10–15)
ERYTHROCYTE [DISTWIDTH] IN BLOOD BY AUTOMATED COUNT: 14 % (ref 10–15)
ERYTHROCYTE [DISTWIDTH] IN BLOOD BY AUTOMATED COUNT: 14.8 % (ref 11.9–15.5)
ERYTHROCYTE [SEDIMENTATION RATE] IN BLOOD BY WESTERGREN METHOD: 7 MM/HR (ref 0–20)
FERRITIN SERPL-MCNC: 465 NG/ML (ref 10–130)
GFR ESTIMATED (EXTERNAL): >60 ML/MIN/1.73M2
GFR ESTIMATED (EXTERNAL): >60 ML/MIN/1.73M2
GFR SERPL CREATININE-BSD FRML MDRD: 88 ML/MIN/1.73M2
GFR SERPL CREATININE-BSD FRML MDRD: 89 ML/MIN/1.73M2
GFR SERPL CREATININE-BSD FRML MDRD: >90 ML/MIN/1.73M2
GLUCOSE (EXTERNAL): 74 MG/DL (ref 70–100)
GLUCOSE (EXTERNAL): 75 MG/DL (ref 70–100)
GLUCOSE BLD-MCNC: 115 MG/DL (ref 70–99)
GLUCOSE BLD-MCNC: 73 MG/DL (ref 70–125)
GLUCOSE BLD-MCNC: 78 MG/DL (ref 70–99)
GLUCOSE BLD-MCNC: 82 MG/DL (ref 70–99)
GLUCOSE BLD-MCNC: 86 MG/DL (ref 70–99)
GLUCOSE BLD-MCNC: 95 MG/DL (ref 70–125)
GLUCOSE UR STRIP-MCNC: NEGATIVE MG/DL
GLUCOSE UR STRIP-MCNC: NEGATIVE MG/DL
HCT VFR BLD AUTO: 38.3 % (ref 35–47)
HCT VFR BLD AUTO: 39.1 % (ref 35–47)
HEMATOCRIT (EXTERNAL): 38.4 % (ref 34.9–44.5)
HEMOGLOBIN (EXTERNAL): 11.9 G/DL (ref 12–15.5)
HGB BLD-MCNC: 12.7 G/DL (ref 11.7–15.7)
HGB BLD-MCNC: 12.9 G/DL (ref 11.7–15.7)
HGB UR QL STRIP: NEGATIVE
HGB UR QL STRIP: NEGATIVE
HOLD SPECIMEN: NORMAL
HYALINE CASTS: 4 /LPF
IMM GRANULOCYTES # BLD: 0 10E3/UL
IMM GRANULOCYTES NFR BLD: 0 %
INTERPRETATION ECG - MUSE: NORMAL
KETONES UR STRIP-MCNC: NEGATIVE MG/DL
KETONES UR STRIP-MCNC: NEGATIVE MG/DL
LEUKOCYTE ESTERASE UR QL STRIP: ABNORMAL
LEUKOCYTE ESTERASE UR QL STRIP: NEGATIVE
LYMPHOCYTES # BLD AUTO: 0.3 10E3/UL (ref 0.8–5.3)
LYMPHOCYTES NFR BLD AUTO: 5 %
MCH RBC QN AUTO: 31.4 PG (ref 26.5–33)
MCH RBC QN AUTO: 31.5 PG (ref 26.5–33)
MCH RBC QN AUTO: 31.8 PG (ref 27.6–33.3)
MCHC RBC AUTO-ENTMCNC: 31 G/DL (ref 31.5–35.2)
MCHC RBC AUTO-ENTMCNC: 33 G/DL (ref 31.5–36.5)
MCHC RBC AUTO-ENTMCNC: 33.2 G/DL (ref 31.5–36.5)
MCV RBC AUTO: 102.7 FL (ref 80–100)
MCV RBC AUTO: 95 FL (ref 78–100)
MCV RBC AUTO: 95 FL (ref 78–100)
MONOCYTES # BLD AUTO: 0.8 10E3/UL (ref 0–1.3)
MONOCYTES NFR BLD AUTO: 11 %
MUCOUS THREADS #/AREA URNS LPF: PRESENT /LPF
MUCOUS THREADS #/AREA URNS LPF: PRESENT /LPF
NEUTROPHILS # BLD AUTO: 5.4 10E3/UL (ref 1.6–8.3)
NEUTROPHILS NFR BLD AUTO: 81 %
NITRATE UR QL: NEGATIVE
NITRATE UR QL: NEGATIVE
NRBC # BLD AUTO: 0 10E3/UL
NRBC BLD AUTO-RTO: 0 /100
P AXIS - MUSE: 44 DEGREES
PH UR STRIP: 6 [PH] (ref 5–7)
PH UR STRIP: 8.5 [PH] (ref 5–7)
PLATELET # BLD AUTO: 270 10E3/UL (ref 150–450)
PLATELET # BLD AUTO: 296 10E3/UL (ref 150–450)
PLATELET COUNT (EXTERNAL): 165 X10(9)/L (ref 150–450)
POTASSIUM (EXTERNAL): 3.7 MMOL/L (ref 3.5–5.1)
POTASSIUM (EXTERNAL): 3.8 MMOL/L (ref 3.5–5.1)
POTASSIUM BLD-SCNC: 3.8 MMOL/L (ref 3.4–5.3)
POTASSIUM BLD-SCNC: 3.9 MMOL/L (ref 3.5–5)
POTASSIUM BLD-SCNC: 4 MMOL/L (ref 3.5–5)
POTASSIUM BLD-SCNC: 4.1 MMOL/L (ref 3.4–5.3)
POTASSIUM BLD-SCNC: 4.5 MMOL/L (ref 3.4–5.3)
POTASSIUM BLD-SCNC: 5 MMOL/L (ref 3.4–5.3)
PR INTERVAL - MUSE: 126 MS
PROT SERPL-MCNC: 5.7 G/DL (ref 6–8)
PROT SERPL-MCNC: 5.9 G/DL (ref 6.8–8.8)
QRS DURATION - MUSE: 86 MS
QT - MUSE: 428 MS
QTC - MUSE: 448 MS
R AXIS - MUSE: -18 DEGREES
RBC # BLD AUTO: 3.74 X10(12)/L (ref 3.9–5.03)
RBC # BLD AUTO: 4.05 10E6/UL (ref 3.8–5.2)
RBC # BLD AUTO: 4.1 10E6/UL (ref 3.8–5.2)
RBC URINE: 2 /HPF
RBC URINE: <1 /HPF
SARS-COV-2 RNA RESP QL NAA+PROBE: NEGATIVE
SODIUM (EXTERNAL): 132 MMOL/L (ref 136–145)
SODIUM (EXTERNAL): 133 MMOL/L (ref 136–145)
SODIUM SERPL-SCNC: 119 MMOL/L (ref 133–144)
SODIUM SERPL-SCNC: 122 MMOL/L (ref 133–144)
SODIUM SERPL-SCNC: 122 MMOL/L (ref 133–144)
SODIUM SERPL-SCNC: 124 MMOL/L (ref 136–145)
SODIUM SERPL-SCNC: 125 MMOL/L (ref 133–144)
SODIUM SERPL-SCNC: 125 MMOL/L (ref 133–144)
SODIUM SERPL-SCNC: 125 MMOL/L (ref 136–145)
SODIUM SERPL-SCNC: 127 MMOL/L (ref 133–144)
SODIUM SERPL-SCNC: 128 MMOL/L (ref 133–144)
SODIUM SERPL-SCNC: 128 MMOL/L (ref 133–144)
SODIUM SERPL-SCNC: 130 MMOL/L (ref 133–144)
SP GR UR STRIP: 1.02 (ref 1–1.03)
SP GR UR STRIP: 1.02 (ref 1–1.03)
SQUAMOUS EPITHELIAL: 1 /HPF
SYSTOLIC BLOOD PRESSURE - MUSE: NORMAL MMHG
T AXIS - MUSE: 34 DEGREES
TRANSITIONAL EPI: 1 /HPF
TSH SERPL DL<=0.005 MIU/L-ACNC: 2.76 UIU/ML (ref 0.3–5)
UROBILINOGEN UR STRIP-MCNC: <2 MG/DL
UROBILINOGEN UR STRIP-MCNC: NORMAL MG/DL
VENTRICULAR RATE- MUSE: 66 BPM
VIT B12 SERPL-MCNC: 534 PG/ML (ref 213–816)
WBC # BLD AUTO: 6.7 10E3/UL (ref 4–11)
WBC # BLD AUTO: 6.9 10E3/UL (ref 4–11)
WBC CLUMPS #/AREA URNS HPF: PRESENT /HPF
WBC COUNT (EXTERNAL): 4.3 X10(9)/L (ref 3.5–10.5)
WBC URINE: 1 /HPF
WBC URINE: 100 /HPF

## 2021-01-01 PROCEDURE — 80053 COMPREHEN METABOLIC PANEL: CPT | Mod: ORL | Performed by: PHYSICIAN ASSISTANT

## 2021-01-01 PROCEDURE — 82607 VITAMIN B-12: CPT | Mod: ORL | Performed by: PHYSICIAN ASSISTANT

## 2021-01-01 PROCEDURE — 99309 SBSQ NF CARE MODERATE MDM 30: CPT | Performed by: NURSE PRACTITIONER

## 2021-01-01 PROCEDURE — 97161 PT EVAL LOW COMPLEX 20 MIN: CPT | Mod: GP | Performed by: PHYSICAL THERAPIST

## 2021-01-01 PROCEDURE — 250N000013 HC RX MED GY IP 250 OP 250 PS 637: Performed by: INTERNAL MEDICINE

## 2021-01-01 PROCEDURE — 250N000012 HC RX MED GY IP 250 OP 636 PS 637: Performed by: INTERNAL MEDICINE

## 2021-01-01 PROCEDURE — 99217 PR OBSERVATION CARE DISCHARGE: CPT | Performed by: INTERNAL MEDICINE

## 2021-01-01 PROCEDURE — 258N000003 HC RX IP 258 OP 636: Performed by: EMERGENCY MEDICINE

## 2021-01-01 PROCEDURE — 84295 ASSAY OF SERUM SODIUM: CPT | Performed by: INTERNAL MEDICINE

## 2021-01-01 PROCEDURE — 36415 COLL VENOUS BLD VENIPUNCTURE: CPT | Performed by: EMERGENCY MEDICINE

## 2021-01-01 PROCEDURE — 93005 ELECTROCARDIOGRAM TRACING: CPT

## 2021-01-01 PROCEDURE — P9603 ONE-WAY ALLOW PRORATED MILES: HCPCS | Mod: ORL | Performed by: FAMILY MEDICINE

## 2021-01-01 PROCEDURE — P9604 ONE-WAY ALLOW PRORATED TRIP: HCPCS | Mod: ORL | Performed by: PHYSICIAN ASSISTANT

## 2021-01-01 PROCEDURE — 85652 RBC SED RATE AUTOMATED: CPT | Mod: ORL | Performed by: PHYSICIAN ASSISTANT

## 2021-01-01 PROCEDURE — 250N000011 HC RX IP 250 OP 636: Performed by: EMERGENCY MEDICINE

## 2021-01-01 PROCEDURE — 72131 CT LUMBAR SPINE W/O DYE: CPT

## 2021-01-01 PROCEDURE — 99226 PR SUBSEQUENT OBSERVATION CARE,LEVEL III: CPT | Performed by: INTERNAL MEDICINE

## 2021-01-01 PROCEDURE — 36415 COLL VENOUS BLD VENIPUNCTURE: CPT | Mod: ORL | Performed by: PHYSICIAN ASSISTANT

## 2021-01-01 PROCEDURE — 73030 X-RAY EXAM OF SHOULDER: CPT | Mod: RT

## 2021-01-01 PROCEDURE — 250N000013 HC RX MED GY IP 250 OP 250 PS 637: Mod: GY | Performed by: INTERNAL MEDICINE

## 2021-01-01 PROCEDURE — G0378 HOSPITAL OBSERVATION PER HR: HCPCS

## 2021-01-01 PROCEDURE — 99225 PR SUBSEQUENT OBSERVATION CARE,LEVEL II: CPT | Performed by: INTERNAL MEDICINE

## 2021-01-01 PROCEDURE — 99207 PR NO CHARGE LOS: CPT | Performed by: INTERNAL MEDICINE

## 2021-01-01 PROCEDURE — 96361 HYDRATE IV INFUSION ADD-ON: CPT

## 2021-01-01 PROCEDURE — 99285 EMERGENCY DEPT VISIT HI MDM: CPT | Mod: 25

## 2021-01-01 PROCEDURE — 36415 COLL VENOUS BLD VENIPUNCTURE: CPT | Performed by: INTERNAL MEDICINE

## 2021-01-01 PROCEDURE — G0179 MD RECERTIFICATION HHA PT: HCPCS | Performed by: INTERNAL MEDICINE

## 2021-01-01 PROCEDURE — 96374 THER/PROPH/DIAG INJ IV PUSH: CPT | Mod: 59

## 2021-01-01 PROCEDURE — 36416 COLLJ CAPILLARY BLOOD SPEC: CPT | Performed by: INTERNAL MEDICINE

## 2021-01-01 PROCEDURE — 87086 URINE CULTURE/COLONY COUNT: CPT | Mod: ORL | Performed by: FAMILY MEDICINE

## 2021-01-01 PROCEDURE — 85004 AUTOMATED DIFF WBC COUNT: CPT | Performed by: EMERGENCY MEDICINE

## 2021-01-01 PROCEDURE — 99214 OFFICE O/P EST MOD 30 MIN: CPT | Mod: 95 | Performed by: FAMILY MEDICINE

## 2021-01-01 PROCEDURE — 99220 PR INITIAL OBSERVATION CARE,LEVEL III: CPT | Performed by: INTERNAL MEDICINE

## 2021-01-01 PROCEDURE — 73564 X-RAY EXAM KNEE 4 OR MORE: CPT | Mod: 50

## 2021-01-01 PROCEDURE — 84295 ASSAY OF SERUM SODIUM: CPT | Mod: 91 | Performed by: INTERNAL MEDICINE

## 2021-01-01 PROCEDURE — 80048 BASIC METABOLIC PNL TOTAL CA: CPT | Mod: ORL | Performed by: FAMILY MEDICINE

## 2021-01-01 PROCEDURE — 36415 COLL VENOUS BLD VENIPUNCTURE: CPT | Mod: ORL | Performed by: FAMILY MEDICINE

## 2021-01-01 PROCEDURE — 85027 COMPLETE CBC AUTOMATED: CPT | Mod: ORL | Performed by: PHYSICIAN ASSISTANT

## 2021-01-01 PROCEDURE — 82728 ASSAY OF FERRITIN: CPT | Mod: ORL | Performed by: PHYSICIAN ASSISTANT

## 2021-01-01 PROCEDURE — 80048 BASIC METABOLIC PNL TOTAL CA: CPT | Performed by: INTERNAL MEDICINE

## 2021-01-01 PROCEDURE — C9803 HOPD COVID-19 SPEC COLLECT: HCPCS

## 2021-01-01 PROCEDURE — 87635 SARS-COV-2 COVID-19 AMP PRB: CPT | Performed by: EMERGENCY MEDICINE

## 2021-01-01 PROCEDURE — 84443 ASSAY THYROID STIM HORMONE: CPT | Mod: ORL | Performed by: PHYSICIAN ASSISTANT

## 2021-01-01 PROCEDURE — 97530 THERAPEUTIC ACTIVITIES: CPT | Mod: GP | Performed by: PHYSICAL THERAPIST

## 2021-01-01 PROCEDURE — 80053 COMPREHEN METABOLIC PANEL: CPT | Performed by: EMERGENCY MEDICINE

## 2021-01-01 PROCEDURE — 70450 CT HEAD/BRAIN W/O DYE: CPT

## 2021-01-01 PROCEDURE — 81001 URINALYSIS AUTO W/SCOPE: CPT | Mod: ORL | Performed by: FAMILY MEDICINE

## 2021-01-01 PROCEDURE — 81001 URINALYSIS AUTO W/SCOPE: CPT | Performed by: EMERGENCY MEDICINE

## 2021-01-01 PROCEDURE — 71046 X-RAY EXAM CHEST 2 VIEWS: CPT

## 2021-01-01 PROCEDURE — 82306 VITAMIN D 25 HYDROXY: CPT | Mod: ORL | Performed by: PHYSICIAN ASSISTANT

## 2021-01-01 PROCEDURE — G0180 MD CERTIFICATION HHA PATIENT: HCPCS | Performed by: INTERNAL MEDICINE

## 2021-01-01 RX ORDER — CEFDINIR 300 MG/1
300 CAPSULE ORAL EVERY 12 HOURS SCHEDULED
Status: DISCONTINUED | OUTPATIENT
Start: 2021-01-01 | End: 2021-01-01 | Stop reason: HOSPADM

## 2021-01-01 RX ORDER — GABAPENTIN 100 MG/1
200 CAPSULE ORAL EVERY MORNING
Status: DISCONTINUED | OUTPATIENT
Start: 2021-01-01 | End: 2021-01-01 | Stop reason: HOSPADM

## 2021-01-01 RX ORDER — NALOXONE HYDROCHLORIDE 0.4 MG/ML
0.2 INJECTION, SOLUTION INTRAMUSCULAR; INTRAVENOUS; SUBCUTANEOUS
Status: DISCONTINUED | OUTPATIENT
Start: 2021-01-01 | End: 2021-01-01 | Stop reason: HOSPADM

## 2021-01-01 RX ORDER — PREDNISONE 5 MG/1
5 TABLET ORAL DAILY
Status: DISCONTINUED | OUTPATIENT
Start: 2021-01-01 | End: 2021-01-01 | Stop reason: HOSPADM

## 2021-01-01 RX ORDER — HYDROMORPHONE HYDROCHLORIDE 2 MG/1
2 TABLET ORAL EVERY 4 HOURS PRN
Status: DISCONTINUED | OUTPATIENT
Start: 2021-01-01 | End: 2021-01-01 | Stop reason: HOSPADM

## 2021-01-01 RX ORDER — TAMSULOSIN HYDROCHLORIDE 0.4 MG/1
0.4 CAPSULE ORAL DAILY
DISCHARGE
Start: 2021-01-01 | End: 2021-01-01

## 2021-01-01 RX ORDER — HYDROMORPHONE HYDROCHLORIDE 2 MG/1
2 TABLET ORAL EVERY 4 HOURS PRN
Qty: 180 TABLET | Refills: 0 | Status: SHIPPED | OUTPATIENT
Start: 2021-01-01

## 2021-01-01 RX ORDER — GABAPENTIN 800 MG/1
800 TABLET ORAL 3 TIMES DAILY
Status: DISCONTINUED | OUTPATIENT
Start: 2021-01-01 | End: 2021-01-01 | Stop reason: HOSPADM

## 2021-01-01 RX ORDER — GABAPENTIN 100 MG/1
200 CAPSULE ORAL EVERY MORNING
COMMUNITY
Start: 2021-01-01

## 2021-01-01 RX ORDER — NALOXONE HYDROCHLORIDE 0.4 MG/ML
0.4 INJECTION, SOLUTION INTRAMUSCULAR; INTRAVENOUS; SUBCUTANEOUS
Status: DISCONTINUED | OUTPATIENT
Start: 2021-01-01 | End: 2021-01-01 | Stop reason: HOSPADM

## 2021-01-01 RX ORDER — POTASSIUM CHLORIDE 1500 MG/1
20 TABLET, EXTENDED RELEASE ORAL DAILY
Status: DISCONTINUED | OUTPATIENT
Start: 2021-01-01 | End: 2021-01-01 | Stop reason: HOSPADM

## 2021-01-01 RX ORDER — ONDANSETRON 4 MG/1
4 TABLET, ORALLY DISINTEGRATING ORAL EVERY 6 HOURS PRN
Status: DISCONTINUED | OUTPATIENT
Start: 2021-01-01 | End: 2021-01-01 | Stop reason: HOSPADM

## 2021-01-01 RX ORDER — POLYETHYLENE GLYCOL 3350 17 G/17G
17 POWDER, FOR SOLUTION ORAL 2 TIMES DAILY
Status: DISCONTINUED | OUTPATIENT
Start: 2021-01-01 | End: 2021-01-01 | Stop reason: HOSPADM

## 2021-01-01 RX ORDER — POLYETHYLENE GLYCOL 3350 17 G/17G
17 POWDER, FOR SOLUTION ORAL DAILY PRN
Status: DISCONTINUED | OUTPATIENT
Start: 2021-01-01 | End: 2021-01-01

## 2021-01-01 RX ORDER — SODIUM CHLORIDE 1 G/1
1 TABLET ORAL 2 TIMES DAILY WITH MEALS
Status: DISCONTINUED | OUTPATIENT
Start: 2021-01-01 | End: 2021-01-01 | Stop reason: HOSPADM

## 2021-01-01 RX ORDER — HYDROMORPHONE HYDROCHLORIDE 2 MG/1
2 TABLET ORAL EVERY 4 HOURS PRN
Qty: 180 TABLET | Refills: 0 | Status: SHIPPED | OUTPATIENT
Start: 2021-01-01 | End: 2021-01-01

## 2021-01-01 RX ORDER — GABAPENTIN 600 MG/1
TABLET ORAL
COMMUNITY
Start: 2021-01-01 | End: 2021-01-01

## 2021-01-01 RX ORDER — HYDROMORPHONE HYDROCHLORIDE 2 MG/1
2 TABLET ORAL 2 TIMES DAILY
Status: DISCONTINUED | OUTPATIENT
Start: 2021-01-01 | End: 2021-01-01

## 2021-01-01 RX ORDER — ALBUTEROL SULFATE 90 UG/1
2 AEROSOL, METERED RESPIRATORY (INHALATION) EVERY 4 HOURS PRN
COMMUNITY

## 2021-01-01 RX ORDER — HYDROMORPHONE HYDROCHLORIDE 2 MG/1
TABLET ORAL
Status: ON HOLD | COMMUNITY
Start: 2021-01-01 | End: 2021-01-01

## 2021-01-01 RX ORDER — TAMSULOSIN HYDROCHLORIDE 0.4 MG/1
0.4 CAPSULE ORAL DAILY
Status: DISCONTINUED | OUTPATIENT
Start: 2021-01-01 | End: 2021-01-01 | Stop reason: HOSPADM

## 2021-01-01 RX ORDER — HYDROXYCHLOROQUINE SULFATE 200 MG/1
200 TABLET, FILM COATED ORAL 2 TIMES DAILY
Status: DISCONTINUED | OUTPATIENT
Start: 2021-01-01 | End: 2021-01-01 | Stop reason: HOSPADM

## 2021-01-01 RX ORDER — GABAPENTIN 800 MG/1
800 TABLET ORAL 3 TIMES DAILY
COMMUNITY
Start: 2021-01-01

## 2021-01-01 RX ORDER — SODIUM CHLORIDE 1 G/1
1 TABLET ORAL DAILY
Start: 2021-01-01

## 2021-01-01 RX ORDER — FERROUS SULFATE 325(65) MG
325 TABLET ORAL
Status: DISCONTINUED | OUTPATIENT
Start: 2021-01-01 | End: 2021-01-01 | Stop reason: HOSPADM

## 2021-01-01 RX ORDER — HYDROMORPHONE HYDROCHLORIDE 2 MG/1
2 TABLET ORAL EVERY 4 HOURS PRN
Qty: 15 TABLET | Refills: 0 | Status: SHIPPED | OUTPATIENT
Start: 2021-01-01 | End: 2021-01-01

## 2021-01-01 RX ORDER — BISACODYL 10 MG
10 SUPPOSITORY, RECTAL RECTAL DAILY PRN
Status: DISCONTINUED | OUTPATIENT
Start: 2021-01-01 | End: 2021-01-01 | Stop reason: HOSPADM

## 2021-01-01 RX ORDER — SODIUM CHLORIDE 1 G/1
1 TABLET ORAL 2 TIMES DAILY WITH MEALS
DISCHARGE
Start: 2021-01-01 | End: 2021-01-01

## 2021-01-01 RX ORDER — SODIUM CHLORIDE 1 G/1
1 TABLET ORAL 2 TIMES DAILY WITH MEALS
Status: DISCONTINUED | OUTPATIENT
Start: 2021-01-01 | End: 2021-01-01

## 2021-01-01 RX ORDER — LIDOCAINE 4 G/G
1 PATCH TOPICAL EVERY 24 HOURS
Status: DISCONTINUED | OUTPATIENT
Start: 2021-01-01 | End: 2021-01-01 | Stop reason: HOSPADM

## 2021-01-01 RX ORDER — FENTANYL CITRATE 50 UG/ML
50 INJECTION, SOLUTION INTRAMUSCULAR; INTRAVENOUS ONCE
Status: COMPLETED | OUTPATIENT
Start: 2021-01-01 | End: 2021-01-01

## 2021-01-01 RX ORDER — DOXYCYCLINE 100 MG/1
100 CAPSULE ORAL 2 TIMES DAILY
Status: DISCONTINUED | OUTPATIENT
Start: 2021-01-01 | End: 2021-01-01

## 2021-01-01 RX ORDER — HYDROMORPHONE HYDROCHLORIDE 2 MG/1
2 TABLET ORAL DAILY
Status: DISCONTINUED | OUTPATIENT
Start: 2021-01-01 | End: 2021-01-01 | Stop reason: HOSPADM

## 2021-01-01 RX ORDER — MELATONIN 5 MG
5 TABLET,CHEWABLE ORAL AT BEDTIME
COMMUNITY

## 2021-01-01 RX ORDER — HYDROMORPHONE HYDROCHLORIDE 2 MG/1
2 TABLET ORAL EVERY 4 HOURS PRN
Qty: 120 TABLET | Refills: 0 | Status: SHIPPED | OUTPATIENT
Start: 2021-01-01 | End: 2021-01-01

## 2021-01-01 RX ORDER — POTASSIUM CHLORIDE 1500 MG/1
20 TABLET, EXTENDED RELEASE ORAL
COMMUNITY
Start: 2021-01-01 | End: 2021-01-01

## 2021-01-01 RX ORDER — AMLODIPINE BESYLATE 2.5 MG/1
2.5 TABLET ORAL DAILY
Status: DISCONTINUED | OUTPATIENT
Start: 2021-01-01 | End: 2021-01-01 | Stop reason: HOSPADM

## 2021-01-01 RX ORDER — AMLODIPINE BESYLATE 2.5 MG/1
2.5 TABLET ORAL DAILY
DISCHARGE
Start: 2021-01-01

## 2021-01-01 RX ORDER — TRIAMCINOLONE ACETONIDE 55 UG/1
2 SPRAY, METERED NASAL DAILY
Start: 2021-01-01

## 2021-01-01 RX ORDER — ONDANSETRON 2 MG/ML
4 INJECTION INTRAMUSCULAR; INTRAVENOUS EVERY 6 HOURS PRN
Status: DISCONTINUED | OUTPATIENT
Start: 2021-01-01 | End: 2021-01-01 | Stop reason: HOSPADM

## 2021-01-01 RX ORDER — LOSARTAN POTASSIUM 100 MG/1
100 TABLET ORAL DAILY
Status: DISCONTINUED | OUTPATIENT
Start: 2021-01-01 | End: 2021-01-01 | Stop reason: HOSPADM

## 2021-01-01 RX ORDER — DOXYCYCLINE 100 MG/1
CAPSULE ORAL
Qty: 60 CAPSULE | Refills: 10 | OUTPATIENT
Start: 2021-01-01

## 2021-01-01 RX ORDER — LEVOCARNITINE 330 MG/1
990 TABLET ORAL 2 TIMES DAILY
Status: DISCONTINUED | OUTPATIENT
Start: 2021-01-01 | End: 2021-01-01 | Stop reason: HOSPADM

## 2021-01-01 RX ORDER — MIRTAZAPINE 15 MG/1
15 TABLET, FILM COATED ORAL AT BEDTIME
Start: 2021-01-01

## 2021-01-01 RX ORDER — MAGNESIUM CARB/ALUMINUM HYDROX 105-160MG
296 TABLET,CHEWABLE ORAL ONCE
Status: COMPLETED | OUTPATIENT
Start: 2021-01-01 | End: 2021-01-01

## 2021-01-01 RX ORDER — ALBUTEROL SULFATE 90 UG/1
2 AEROSOL, METERED RESPIRATORY (INHALATION) EVERY 4 HOURS PRN
Status: DISCONTINUED | OUTPATIENT
Start: 2021-01-01 | End: 2021-01-01 | Stop reason: HOSPADM

## 2021-01-01 RX ORDER — ACETAMINOPHEN 500 MG
1000 TABLET ORAL 3 TIMES DAILY
Status: DISCONTINUED | OUTPATIENT
Start: 2021-01-01 | End: 2021-01-01 | Stop reason: HOSPADM

## 2021-01-01 RX ORDER — CEFDINIR 300 MG/1
300 CAPSULE ORAL EVERY 12 HOURS
DISCHARGE
Start: 2021-01-01 | End: 2021-01-01

## 2021-01-01 RX ORDER — CEFPODOXIME PROXETIL 100 MG/1
100 TABLET, FILM COATED ORAL EVERY 12 HOURS SCHEDULED
Status: DISCONTINUED | OUTPATIENT
Start: 2021-01-01 | End: 2021-01-01

## 2021-01-01 RX ORDER — POLYETHYLENE GLYCOL 3350 17 G/17G
17 POWDER, FOR SOLUTION ORAL 2 TIMES DAILY
Qty: 510 G | DISCHARGE
Start: 2021-01-01

## 2021-01-01 RX ORDER — ATENOLOL 50 MG/1
100 TABLET ORAL DAILY
Status: DISCONTINUED | OUTPATIENT
Start: 2021-01-01 | End: 2021-01-01 | Stop reason: HOSPADM

## 2021-01-01 RX ORDER — LORATADINE 10 MG/1
10 TABLET ORAL DAILY
COMMUNITY

## 2021-01-01 RX ORDER — GUAIFENESIN 600 MG/1
600 TABLET, EXTENDED RELEASE ORAL 2 TIMES DAILY
Start: 2021-01-01

## 2021-01-01 RX ORDER — SODIUM CHLORIDE 9 MG/ML
INJECTION, SOLUTION INTRAVENOUS CONTINUOUS
Status: DISCONTINUED | OUTPATIENT
Start: 2021-01-01 | End: 2021-01-01

## 2021-01-01 RX ORDER — IBUPROFEN 200 MG
200 TABLET ORAL 2 TIMES DAILY
COMMUNITY

## 2021-01-01 RX ORDER — IBUPROFEN 200 MG
200 TABLET ORAL 2 TIMES DAILY
Status: DISCONTINUED | OUTPATIENT
Start: 2021-01-01 | End: 2021-01-01 | Stop reason: HOSPADM

## 2021-01-01 RX ADMIN — SODIUM CHLORIDE TAB 1 GM 1 G: 1 TAB at 19:02

## 2021-01-01 RX ADMIN — HYDROXYCHLOROQUINE SULFATE 200 MG: 200 TABLET ORAL at 08:17

## 2021-01-01 RX ADMIN — POLYETHYLENE GLYCOL 3350 17 G: 17 POWDER, FOR SOLUTION ORAL at 20:58

## 2021-01-01 RX ADMIN — HYDROMORPHONE HYDROCHLORIDE 2 MG: 2 TABLET ORAL at 06:17

## 2021-01-01 RX ADMIN — GABAPENTIN 800 MG: 800 TABLET, FILM COATED ORAL at 08:20

## 2021-01-01 RX ADMIN — TAMSULOSIN HYDROCHLORIDE 0.4 MG: 0.4 CAPSULE ORAL at 08:19

## 2021-01-01 RX ADMIN — SODIUM CHLORIDE TAB 1 GM 1 G: 1 TAB at 16:50

## 2021-01-01 RX ADMIN — POTASSIUM CHLORIDE 20 MEQ: 1500 TABLET, EXTENDED RELEASE ORAL at 08:16

## 2021-01-01 RX ADMIN — GABAPENTIN 800 MG: 800 TABLET, FILM COATED ORAL at 21:01

## 2021-01-01 RX ADMIN — ATENOLOL 100 MG: 25 TABLET ORAL at 09:23

## 2021-01-01 RX ADMIN — LIDOCAINE 1 PATCH: 246 PATCH TOPICAL at 16:51

## 2021-01-01 RX ADMIN — GABAPENTIN 800 MG: 800 TABLET, FILM COATED ORAL at 08:10

## 2021-01-01 RX ADMIN — Medication 1 MG: at 02:45

## 2021-01-01 RX ADMIN — GABAPENTIN 800 MG: 800 TABLET, FILM COATED ORAL at 20:49

## 2021-01-01 RX ADMIN — HYDROMORPHONE HYDROCHLORIDE 2 MG: 2 TABLET ORAL at 09:32

## 2021-01-01 RX ADMIN — DOXYCYCLINE 100 MG: 100 CAPSULE ORAL at 19:26

## 2021-01-01 RX ADMIN — Medication 600 MG: at 08:19

## 2021-01-01 RX ADMIN — SODIUM CHLORIDE TAB 1 GM 1 G: 1 TAB at 08:52

## 2021-01-01 RX ADMIN — GABAPENTIN 800 MG: 800 TABLET, FILM COATED ORAL at 16:02

## 2021-01-01 RX ADMIN — HYDROXYCHLOROQUINE SULFATE 200 MG: 200 TABLET ORAL at 20:55

## 2021-01-01 RX ADMIN — LEVOCARNITINE 990 MG: 330 TABLET ORAL at 08:16

## 2021-01-01 RX ADMIN — GABAPENTIN 800 MG: 800 TABLET, FILM COATED ORAL at 17:21

## 2021-01-01 RX ADMIN — ACETAMINOPHEN 1000 MG: 500 TABLET, FILM COATED ORAL at 21:03

## 2021-01-01 RX ADMIN — LOSARTAN POTASSIUM 100 MG: 100 TABLET, FILM COATED ORAL at 08:51

## 2021-01-01 RX ADMIN — HYDROMORPHONE HYDROCHLORIDE 2 MG: 2 TABLET ORAL at 13:18

## 2021-01-01 RX ADMIN — HYDROMORPHONE HYDROCHLORIDE 2 MG: 2 TABLET ORAL at 20:51

## 2021-01-01 RX ADMIN — FENTANYL CITRATE 50 MCG: 50 INJECTION, SOLUTION INTRAMUSCULAR; INTRAVENOUS at 12:08

## 2021-01-01 RX ADMIN — CEFDINIR 300 MG: 300 CAPSULE ORAL at 20:54

## 2021-01-01 RX ADMIN — HYDROXYCHLOROQUINE SULFATE 200 MG: 200 TABLET ORAL at 08:20

## 2021-01-01 RX ADMIN — DOXYCYCLINE 100 MG: 100 CAPSULE ORAL at 08:16

## 2021-01-01 RX ADMIN — ACETAMINOPHEN 1000 MG: 500 TABLET, FILM COATED ORAL at 08:52

## 2021-01-01 RX ADMIN — TAMSULOSIN HYDROCHLORIDE 0.4 MG: 0.4 CAPSULE ORAL at 08:11

## 2021-01-01 RX ADMIN — LOSARTAN POTASSIUM 100 MG: 100 TABLET, FILM COATED ORAL at 08:10

## 2021-01-01 RX ADMIN — POLYETHYLENE GLYCOL 3350 17 G: 17 POWDER, FOR SOLUTION ORAL at 09:23

## 2021-01-01 RX ADMIN — ACETAMINOPHEN 1000 MG: 500 TABLET, FILM COATED ORAL at 22:06

## 2021-01-01 RX ADMIN — HYDROMORPHONE HYDROCHLORIDE 2 MG: 2 TABLET ORAL at 08:17

## 2021-01-01 RX ADMIN — CEFDINIR 300 MG: 300 CAPSULE ORAL at 08:11

## 2021-01-01 RX ADMIN — LEVOCARNITINE 990 MG: 330 TABLET ORAL at 20:55

## 2021-01-01 RX ADMIN — LOSARTAN POTASSIUM 100 MG: 100 TABLET, FILM COATED ORAL at 08:19

## 2021-01-01 RX ADMIN — ATENOLOL 100 MG: 25 TABLET ORAL at 08:10

## 2021-01-01 RX ADMIN — POTASSIUM CHLORIDE 20 MEQ: 1500 TABLET, EXTENDED RELEASE ORAL at 08:20

## 2021-01-01 RX ADMIN — FERROUS SULFATE TAB 325 MG (65 MG ELEMENTAL FE) 325 MG: 325 (65 FE) TAB at 08:53

## 2021-01-01 RX ADMIN — IBUPROFEN 200 MG: 200 TABLET, FILM COATED ORAL at 20:55

## 2021-01-01 RX ADMIN — HYDROMORPHONE HYDROCHLORIDE 2 MG: 2 TABLET ORAL at 12:29

## 2021-01-01 RX ADMIN — LIDOCAINE 1 PATCH: 246 PATCH TOPICAL at 20:58

## 2021-01-01 RX ADMIN — DOXYCYCLINE 100 MG: 100 CAPSULE ORAL at 08:53

## 2021-01-01 RX ADMIN — POLYETHYLENE GLYCOL 3350 17 G: 17 POWDER, FOR SOLUTION ORAL at 10:43

## 2021-01-01 RX ADMIN — PREDNISONE 5 MG: 5 TABLET ORAL at 08:51

## 2021-01-01 RX ADMIN — Medication 600 MG: at 08:53

## 2021-01-01 RX ADMIN — HYDROMORPHONE HYDROCHLORIDE 2 MG: 2 TABLET ORAL at 05:27

## 2021-01-01 RX ADMIN — POTASSIUM CHLORIDE 20 MEQ: 1500 TABLET, EXTENDED RELEASE ORAL at 08:53

## 2021-01-01 RX ADMIN — HYDROMORPHONE HYDROCHLORIDE 2 MG: 2 TABLET ORAL at 20:01

## 2021-01-01 RX ADMIN — IBUPROFEN 200 MG: 200 TABLET, FILM COATED ORAL at 21:59

## 2021-01-01 RX ADMIN — GABAPENTIN 800 MG: 800 TABLET, FILM COATED ORAL at 08:51

## 2021-01-01 RX ADMIN — Medication 600 MG: at 08:10

## 2021-01-01 RX ADMIN — LEVOCARNITINE 990 MG: 330 TABLET ORAL at 20:48

## 2021-01-01 RX ADMIN — POLYETHYLENE GLYCOL 3350 17 G: 17 POWDER, FOR SOLUTION ORAL at 20:57

## 2021-01-01 RX ADMIN — LIDOCAINE 1 PATCH: 246 PATCH TOPICAL at 15:39

## 2021-01-01 RX ADMIN — AMLODIPINE BESYLATE 2.5 MG: 2.5 TABLET ORAL at 08:51

## 2021-01-01 RX ADMIN — SODIUM CHLORIDE TAB 1 GM 1 G: 1 TAB at 08:10

## 2021-01-01 RX ADMIN — IBUPROFEN 200 MG: 200 TABLET, FILM COATED ORAL at 08:10

## 2021-01-01 RX ADMIN — HYDROXYCHLOROQUINE SULFATE 200 MG: 200 TABLET ORAL at 08:52

## 2021-01-01 RX ADMIN — LEVOCARNITINE 990 MG: 330 TABLET ORAL at 08:11

## 2021-01-01 RX ADMIN — ACETAMINOPHEN 1000 MG: 500 TABLET, FILM COATED ORAL at 08:16

## 2021-01-01 RX ADMIN — ACETAMINOPHEN 1000 MG: 500 TABLET, FILM COATED ORAL at 15:39

## 2021-01-01 RX ADMIN — GABAPENTIN 800 MG: 800 TABLET, FILM COATED ORAL at 16:50

## 2021-01-01 RX ADMIN — GABAPENTIN 800 MG: 800 TABLET, FILM COATED ORAL at 15:39

## 2021-01-01 RX ADMIN — HYDROMORPHONE HYDROCHLORIDE 2 MG: 2 TABLET ORAL at 15:39

## 2021-01-01 RX ADMIN — LEVOCARNITINE 990 MG: 330 TABLET ORAL at 19:28

## 2021-01-01 RX ADMIN — HYDROMORPHONE HYDROCHLORIDE 2 MG: 2 TABLET ORAL at 12:47

## 2021-01-01 RX ADMIN — POTASSIUM CHLORIDE 20 MEQ: 1500 TABLET, EXTENDED RELEASE ORAL at 08:10

## 2021-01-01 RX ADMIN — IBUPROFEN 200 MG: 200 TABLET, FILM COATED ORAL at 20:58

## 2021-01-01 RX ADMIN — GABAPENTIN 800 MG: 800 TABLET, FILM COATED ORAL at 16:08

## 2021-01-01 RX ADMIN — ACETAMINOPHEN 1000 MG: 500 TABLET, FILM COATED ORAL at 17:21

## 2021-01-01 RX ADMIN — HYDROXYCHLOROQUINE SULFATE 200 MG: 200 TABLET ORAL at 20:52

## 2021-01-01 RX ADMIN — POTASSIUM CHLORIDE 20 MEQ: 1500 TABLET, EXTENDED RELEASE ORAL at 08:52

## 2021-01-01 RX ADMIN — HYDROXYCHLOROQUINE SULFATE 200 MG: 200 TABLET ORAL at 19:27

## 2021-01-01 RX ADMIN — HYDROMORPHONE HYDROCHLORIDE 2 MG: 2 TABLET ORAL at 14:43

## 2021-01-01 RX ADMIN — CEFDINIR 300 MG: 300 CAPSULE ORAL at 08:51

## 2021-01-01 RX ADMIN — GABAPENTIN 200 MG: 100 CAPSULE ORAL at 08:20

## 2021-01-01 RX ADMIN — HYDROMORPHONE HYDROCHLORIDE 2 MG: 2 TABLET ORAL at 02:14

## 2021-01-01 RX ADMIN — PREDNISONE 5 MG: 5 TABLET ORAL at 08:17

## 2021-01-01 RX ADMIN — HYDROXYCHLOROQUINE SULFATE 200 MG: 200 TABLET ORAL at 08:51

## 2021-01-01 RX ADMIN — ACETAMINOPHEN 1000 MG: 500 TABLET, FILM COATED ORAL at 08:10

## 2021-01-01 RX ADMIN — SODIUM CHLORIDE TAB 1 GM 1 G: 1 TAB at 18:56

## 2021-01-01 RX ADMIN — IBUPROFEN 200 MG: 200 TABLET, FILM COATED ORAL at 08:50

## 2021-01-01 RX ADMIN — ATENOLOL 100 MG: 25 TABLET ORAL at 08:20

## 2021-01-01 RX ADMIN — IBUPROFEN 200 MG: 200 TABLET, FILM COATED ORAL at 19:28

## 2021-01-01 RX ADMIN — HYDROMORPHONE HYDROCHLORIDE 2 MG: 2 TABLET ORAL at 07:49

## 2021-01-01 RX ADMIN — MAGNESIUM CITRATE 296 ML: 1.75 LIQUID ORAL at 14:04

## 2021-01-01 RX ADMIN — GABAPENTIN 200 MG: 100 CAPSULE ORAL at 08:52

## 2021-01-01 RX ADMIN — Medication 600 MG: at 08:51

## 2021-01-01 RX ADMIN — LEVOCARNITINE 990 MG: 330 TABLET ORAL at 20:02

## 2021-01-01 RX ADMIN — ACETAMINOPHEN 1000 MG: 500 TABLET, FILM COATED ORAL at 20:50

## 2021-01-01 RX ADMIN — TAMSULOSIN HYDROCHLORIDE 0.4 MG: 0.4 CAPSULE ORAL at 10:53

## 2021-01-01 RX ADMIN — ATENOLOL 100 MG: 25 TABLET ORAL at 08:16

## 2021-01-01 RX ADMIN — ACETAMINOPHEN 1000 MG: 500 TABLET, FILM COATED ORAL at 16:50

## 2021-01-01 RX ADMIN — HYDROMORPHONE HYDROCHLORIDE 2 MG: 2 TABLET ORAL at 04:10

## 2021-01-01 RX ADMIN — POLYETHYLENE GLYCOL 3350 17 G: 17 POWDER, FOR SOLUTION ORAL at 08:11

## 2021-01-01 RX ADMIN — HYDROXYCHLOROQUINE SULFATE 200 MG: 200 TABLET ORAL at 08:10

## 2021-01-01 RX ADMIN — GABAPENTIN 800 MG: 800 TABLET, FILM COATED ORAL at 21:05

## 2021-01-01 RX ADMIN — SODIUM CHLORIDE TAB 1 GM 1 G: 1 TAB at 22:06

## 2021-01-01 RX ADMIN — HYDROMORPHONE HYDROCHLORIDE 2 MG: 2 TABLET ORAL at 01:17

## 2021-01-01 RX ADMIN — LOSARTAN POTASSIUM 100 MG: 100 TABLET, FILM COATED ORAL at 08:52

## 2021-01-01 RX ADMIN — PREDNISONE 5 MG: 5 TABLET ORAL at 08:50

## 2021-01-01 RX ADMIN — AMLODIPINE BESYLATE 2.5 MG: 2.5 TABLET ORAL at 08:50

## 2021-01-01 RX ADMIN — FERROUS SULFATE TAB 325 MG (65 MG ELEMENTAL FE) 325 MG: 325 (65 FE) TAB at 08:11

## 2021-01-01 RX ADMIN — SODIUM CHLORIDE TAB 1 GM 1 G: 1 TAB at 08:19

## 2021-01-01 RX ADMIN — ACETAMINOPHEN 1000 MG: 500 TABLET, FILM COATED ORAL at 21:01

## 2021-01-01 RX ADMIN — LEVOCARNITINE 990 MG: 330 TABLET ORAL at 08:52

## 2021-01-01 RX ADMIN — TAMSULOSIN HYDROCHLORIDE 0.4 MG: 0.4 CAPSULE ORAL at 08:53

## 2021-01-01 RX ADMIN — GABAPENTIN 800 MG: 800 TABLET, FILM COATED ORAL at 08:52

## 2021-01-01 RX ADMIN — GABAPENTIN 200 MG: 100 CAPSULE ORAL at 08:51

## 2021-01-01 RX ADMIN — SODIUM CHLORIDE TAB 1 GM 1 G: 1 TAB at 08:51

## 2021-01-01 RX ADMIN — GABAPENTIN 200 MG: 100 CAPSULE ORAL at 08:10

## 2021-01-01 RX ADMIN — SODIUM CHLORIDE TAB 1 GM 1 G: 1 TAB at 17:41

## 2021-01-01 RX ADMIN — SODIUM CHLORIDE: 9 INJECTION, SOLUTION INTRAVENOUS at 12:08

## 2021-01-01 RX ADMIN — AMLODIPINE BESYLATE 2.5 MG: 2.5 TABLET ORAL at 08:11

## 2021-01-01 RX ADMIN — HYDROMORPHONE HYDROCHLORIDE 2 MG: 2 TABLET ORAL at 01:27

## 2021-01-01 RX ADMIN — ACETAMINOPHEN 1000 MG: 500 TABLET, FILM COATED ORAL at 08:51

## 2021-01-01 RX ADMIN — PREDNISONE 5 MG: 5 TABLET ORAL at 08:10

## 2021-01-01 RX ADMIN — LIDOCAINE 1 PATCH: 246 PATCH TOPICAL at 20:49

## 2021-01-01 RX ADMIN — LIDOCAINE 1 PATCH: 246 PATCH TOPICAL at 17:21

## 2021-01-01 RX ADMIN — LOSARTAN POTASSIUM 100 MG: 100 TABLET, FILM COATED ORAL at 08:18

## 2021-01-01 RX ADMIN — LEVOCARNITINE 990 MG: 330 TABLET ORAL at 20:57

## 2021-01-01 RX ADMIN — FERROUS SULFATE TAB 325 MG (65 MG ELEMENTAL FE) 325 MG: 325 (65 FE) TAB at 08:52

## 2021-01-01 RX ADMIN — FERROUS SULFATE TAB 325 MG (65 MG ELEMENTAL FE) 325 MG: 325 (65 FE) TAB at 08:18

## 2021-01-01 RX ADMIN — DOXYCYCLINE 100 MG: 100 CAPSULE ORAL at 20:49

## 2021-01-01 RX ADMIN — HYDROXYCHLOROQUINE SULFATE 200 MG: 200 TABLET ORAL at 20:02

## 2021-01-01 RX ADMIN — CEFDINIR 300 MG: 300 CAPSULE ORAL at 08:20

## 2021-01-01 RX ADMIN — Medication 600 MG: at 08:16

## 2021-01-01 RX ADMIN — GABAPENTIN 800 MG: 800 TABLET, FILM COATED ORAL at 21:03

## 2021-01-01 RX ADMIN — GABAPENTIN 200 MG: 100 CAPSULE ORAL at 08:17

## 2021-01-01 RX ADMIN — HYDROMORPHONE HYDROCHLORIDE 2 MG: 2 TABLET ORAL at 02:06

## 2021-01-01 RX ADMIN — AMLODIPINE BESYLATE 2.5 MG: 2.5 TABLET ORAL at 08:20

## 2021-01-01 RX ADMIN — IBUPROFEN 200 MG: 200 TABLET, FILM COATED ORAL at 08:19

## 2021-01-01 RX ADMIN — HYDROMORPHONE HYDROCHLORIDE 2 MG: 2 TABLET ORAL at 17:41

## 2021-01-01 RX ADMIN — HYDROMORPHONE HYDROCHLORIDE 2 MG: 2 TABLET ORAL at 18:37

## 2021-01-01 RX ADMIN — ACETAMINOPHEN 1000 MG: 500 TABLET, FILM COATED ORAL at 08:19

## 2021-01-01 RX ADMIN — GABAPENTIN 800 MG: 800 TABLET, FILM COATED ORAL at 22:06

## 2021-01-01 RX ADMIN — HYDROMORPHONE HYDROCHLORIDE 2 MG: 2 TABLET ORAL at 06:13

## 2021-01-01 RX ADMIN — IBUPROFEN 200 MG: 200 TABLET, FILM COATED ORAL at 08:53

## 2021-01-01 RX ADMIN — IBUPROFEN 200 MG: 200 TABLET, FILM COATED ORAL at 08:18

## 2021-01-01 RX ADMIN — PREDNISONE 5 MG: 5 TABLET ORAL at 08:19

## 2021-01-01 RX ADMIN — ACETAMINOPHEN 1000 MG: 500 TABLET, FILM COATED ORAL at 21:05

## 2021-01-01 RX ADMIN — ATENOLOL 100 MG: 25 TABLET ORAL at 08:50

## 2021-01-01 RX ADMIN — ACETAMINOPHEN 1000 MG: 500 TABLET, FILM COATED ORAL at 16:02

## 2021-01-01 RX ADMIN — CEFDINIR 300 MG: 300 CAPSULE ORAL at 19:07

## 2021-01-01 RX ADMIN — CEFDINIR 300 MG: 300 CAPSULE ORAL at 21:05

## 2021-01-01 RX ADMIN — ACETAMINOPHEN 1000 MG: 500 TABLET, FILM COATED ORAL at 16:07

## 2021-01-01 RX ADMIN — IBUPROFEN 200 MG: 200 TABLET, FILM COATED ORAL at 20:49

## 2021-01-01 RX ADMIN — TAMSULOSIN HYDROCHLORIDE 0.4 MG: 0.4 CAPSULE ORAL at 08:50

## 2021-01-01 RX ADMIN — HYDROMORPHONE HYDROCHLORIDE 2 MG: 2 TABLET ORAL at 19:02

## 2021-01-01 RX ADMIN — FERROUS SULFATE TAB 325 MG (65 MG ELEMENTAL FE) 325 MG: 325 (65 FE) TAB at 08:20

## 2021-01-01 RX ADMIN — HYDROMORPHONE HYDROCHLORIDE 2 MG: 2 TABLET ORAL at 10:03

## 2021-01-01 RX ADMIN — SODIUM CHLORIDE TAB 1 GM 1 G: 1 TAB at 08:16

## 2021-01-01 RX ADMIN — LEVOCARNITINE 990 MG: 330 TABLET ORAL at 08:19

## 2021-01-01 RX ADMIN — GABAPENTIN 800 MG: 800 TABLET, FILM COATED ORAL at 08:15

## 2021-01-01 RX ADMIN — HYDROMORPHONE HYDROCHLORIDE 2 MG: 2 TABLET ORAL at 17:34

## 2021-01-01 RX ADMIN — HYDROXYCHLOROQUINE SULFATE 200 MG: 200 TABLET ORAL at 20:58

## 2021-01-01 ASSESSMENT — ENCOUNTER SYMPTOMS
WEAKNESS: 1
HEADACHES: 0
DYSURIA: 0
FEVER: 0
CHILLS: 0
BACK PAIN: 1

## 2021-01-01 ASSESSMENT — MIFFLIN-ST. JEOR
SCORE: 1155.35
SCORE: 1129.04
SCORE: 1125.42
SCORE: 1128.59
SCORE: 1112.71
SCORE: 1100.92
SCORE: 1187.1

## 2021-01-01 ASSESSMENT — PAIN SCALES - GENERAL: PAINLEVEL: EXTREME PAIN (8)

## 2021-06-14 PROBLEM — S32.000S LUMBAR COMPRESSION FRACTURE, SEQUELA: Status: ACTIVE | Noted: 2021-01-01

## 2021-06-14 PROBLEM — M48.061 SPINAL STENOSIS OF LUMBAR REGION: Status: ACTIVE | Noted: 2021-01-01

## 2021-06-14 PROBLEM — E22.2 SIADH (SYNDROME OF INAPPROPRIATE ADH PRODUCTION) (H): Status: ACTIVE | Noted: 2021-01-01

## 2021-06-14 PROBLEM — M54.50 BILATERAL LOW BACK PAIN WITHOUT SCIATICA, UNSPECIFIED CHRONICITY: Status: RESOLVED | Noted: 2020-12-17 | Resolved: 2021-01-01

## 2021-06-14 PROBLEM — L93.0 DISCOID LUPUS: Status: ACTIVE | Noted: 2021-01-01

## 2021-06-14 PROBLEM — E87.1 HYPONATREMIA: Status: RESOLVED | Noted: 2020-12-17 | Resolved: 2021-01-01

## 2021-06-14 PROBLEM — M81.0 OSTEOPOROSIS: Status: ACTIVE | Noted: 2021-01-01

## 2021-06-14 PROBLEM — R33.9 URINARY RETENTION: Status: ACTIVE | Noted: 2021-01-01

## 2021-06-14 PROBLEM — Z85.72 HISTORY OF NON-HODGKIN'S LYMPHOMA: Status: ACTIVE | Noted: 2021-01-01

## 2021-06-14 PROBLEM — H40.89 OTHER SPECIFIED GLAUCOMA: Status: ACTIVE | Noted: 2021-01-01

## 2021-06-14 NOTE — PROGRESS NOTES
Nicolás is a 81 year old who is being evaluated via a billable video visit.      How would you like to obtain your AVS? MyChart  If the video visit is dropped, the invitation should be resent by: Text to cell phone: [phone]  Will anyone else be joining your video visit? No    Video Start Time: 11:27 AM    Assessment & Plan   Problem List Items Addressed This Visit     Discoid lupus     Chronic prednisone, Plaquenil therapy         Essential hypertension, benign - Primary     Treated, status unknown         History of non-Hodgkin's lymphoma     Hematology, [place]         Lumbar compression fracture, sequela     L3 Burst. Not amenable to vertebroplasty         Relevant Medications    HYDROmorphone (DILAUDID) 2 MG tablet    gabapentin (NEURONTIN) 800 MG tablet    gabapentin (NEURONTIN) 100 MG capsule    Morbid obesity, unspecified obesity type (H)     Longstanding         JIGNESH (obstructive sleep apnea)     Untreated         Osteoporosis     Vertebral insufficiency fracture         Relevant Medications    HYDROmorphone (DILAUDID) 2 MG tablet    Other specified glaucoma     Untreated         Physical deconditioning     Noted throughout medical records         Pulmonary interstitial fibrosis (H)     Currently untreated. No oxygen         Rheumatoid arthritis (HCC)     Plaquenil, prednisone therapy         Relevant Medications    HYDROmorphone (DILAUDID) 2 MG tablet    SIADH (syndrome of inappropriate ADH production) (H)     Nephrology evaluation  in hospital. 1.2 L fluid restriction at that time         Spinal stenosis of lumbar region     With extensive arthritis         Relevant Medications    HYDROmorphone (DILAUDID) 2 MG tablet    gabapentin (NEURONTIN) 800 MG tablet    gabapentin (NEURONTIN) 100 MG capsule    Urinary retention     Attributed to tramadol                Review of prior external note(s) from -   [place], [facility], [facility]  No LOS data to display   Time spent doing chart review, history and exam,  "documentation and further activities per the note       BMI:   Estimated body mass index is 34.94 kg/m  as calculated from the following:    Height as of this encounter: 1.499 m (4' 11\").    Weight as of this encounter: 78.5 kg (173 lb).   Weight management plan: To be assessed by new PCP group        Return in about 2 weeks (around 6/28/2021), or Care will be assumed by the [facility] group. Care will be taken over by the [facility] medical group    Alberto Taveras MD  [facility]   Nicolás is a 81 year old who presents for the following health issues  accompanied by her daughter, with whom we are given permission to speak:    HPI     Concern - Pt is transitioning to assisted living and needs Provider to sign forms for care. This is a medical record, and this patient has never been seen by me before. Information is gathered from the entire chart  Pt's daughter will be MyCharting forms over to Provider prior to apt time.      Onset: 06/14/21  Description: Pt's daughter states that pain in not being controlled by pain medication, denies any other issues  Intensity: severe  Progression of Symptoms:  worsening  Accompanying Signs & Symptoms: pain, unable to care for herself   Previous history of similar problem:   Precipitating factors:        Worsened by: daily activity   Alleviating factors:        Improved by: nothing   Therapies tried and outcome: prescribed medications   Records indicate a Mantoux test was given upon admission to transitional care unit, but I cannot find records      Review of Systems           Objective    Vitals - Patient Reported  Pain Score: Extreme Pain (8)  Morbidly obese.  No distress  Vitals:  No vitals were obtained today due to virtual visit.    Physical Exam                   Video-Visit Details    Type of service:  Video Visit    Video End Time:11:37 AM    Originating Location (pt. Location): Home    Distant Location (provider location):  [facility]" [facility] reMail     Platform used for Video Visit: Carol

## 2021-06-15 NOTE — TELEPHONE ENCOUNTER
Reason for call:  Other   Patient called regarding (reason for call): call back  Additional comments: Patient's daughter is calling because one of the forms concerning medications was not signed. Daughter states Dr. Taveras has form already. Please sign and resend. Please call daughter when completed at [phone]. Patient is moving into assisted living tomorrow and needs this completed.     Phone number to reach patient:  Home number on file [phone] (home)    Best Time:  Any time    Can we leave a detailed message on this number?  YES    Travel screening: Not Applicable

## 2021-06-15 NOTE — TELEPHONE ENCOUNTER
Duplicate, see other [employer] message  Yocasta Fortune RN, BSN  Message handled by CLINIC NURSE.

## 2021-06-15 NOTE — TELEPHONE ENCOUNTER
Routing to [employer] TC,    See patients mychart messages (5 of them) from yesterday regarding forms    Ryan Sinclair RN

## 2021-06-15 NOTE — TELEPHONE ENCOUNTER
Also routed to , please huddle with TC and sign medication form, TC please locate medication form, see [employer] message    Could you please have him sign it and fax it to [phone]    Yocasta Fortune RN, BSN  Message handled by CLINIC NURSE.

## 2021-06-15 NOTE — TELEPHONE ENCOUNTER
Routing to bronze  - routed incorrectly to Lexx Connell, Registered Nurse, PAL (Patient Advocate Liason)   [facility]   [phone]

## 2021-06-16 NOTE — TELEPHONE ENCOUNTER
One place for signature, that was signed.  Contact the residents, ask what else they want signed please  Alberto Taveras MD

## 2021-07-01 NOTE — TELEPHONE ENCOUNTER
Fax received from JUAN RAMON - Revision to Plan of Care 06/05/21 for review and signature.  Put in Dr. Hernandez's in basket.

## 2021-07-26 NOTE — LETTER
[facility]  [address]  [facility] [address]  Phone: [phone]        August 3, 2021      Nicolás Wyman                                                                                                                                [address] [place] [address]            Dear Ms. Wyman,    I am writing to let you know that Dr. Hernandez would like you to contact the originating provider that prescribed Doxycycline as she is not going to refill this medication. Let us know if you have any questions.      Thank you,      [facility]

## 2021-07-28 NOTE — TELEPHONE ENCOUNTER
JUAN RAMON did not receive forms.  Original already sent to [facility] for scanning.  Please complete duplicate form and fax.  Put in Dr. Hernandez's in basket.

## 2021-07-28 NOTE — TELEPHONE ENCOUNTER
Pending Prescriptions:                       Disp   Refills    doxycycline hyclate (VIBRAMYCIN) 100 MG ca*60 cap*10       Si CAP BY MOUTH TWICE DAILY ++NEED STOP DATE++    Routing refill request to provider for review/approval because:  Drug not on the [facility] refill protocol

## 2021-08-22 PROBLEM — T07.XXXA MULTIPLE BRUISES: Status: ACTIVE | Noted: 2021-01-01

## 2021-08-22 PROBLEM — E87.1 HYPONATREMIA: Status: ACTIVE | Noted: 2021-01-01
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
## 2021-08-22 NOTE — PHARMACY-ADMISSION MEDICATION HISTORY
Pharmacy Medication History  Admission medication history interview status for the 8/22/2021  admission is complete. See EPIC admission navigator for prior to admission medications     Location of Interview: NA  Medication history sources: MAR ([facility])    Significant changes made to the medication list:  Added albuterol, voltaren gel, CBD gummy, medical marijuana, ibuprofen, narcan     In the past week, patient estimated taking medication this percent of the time: greater than 90%    Additional medication history information:   Patient insists she takes amlodipine, however this medication does not appear on the MAR. Records show patient used to take amlodipine with dispense records as recently as 7/18/21. Patient recently moved to a new facility: [facility]    Medication reconciliation completed by provider prior to medication history? No    Time spent in this activity: 30min    Prior to Admission medications    Medication Sig Last Dose Taking? Auth Provider   acetaminophen (TYLENOL) 325 MG tablet Take 3 tablets (975 mg) by mouth 3 times daily 8/22/2021 at Unknown time Yes Brenda Murillo PA-C   acetylcysteine (N-ACETYL CYSTEINE) 600 MG CAPS capsule Take 1 capsule (600 mg) by mouth daily 8/22/2021 at Unknown time Yes Yocasta Hernandez MD   albuterol (PROAIR HFA/PROVENTIL HFA/VENTOLIN HFA) 108 (90 Base) MCG/ACT inhaler Inhale 2 puffs into the lungs every 4 hours as needed for shortness of breath / dyspnea or wheezing prn at prn Yes Unknown, Entered By History   aspirin 81 MG tablet Take 1 tablet (81 mg) by mouth daily 8/22/2021 at Unknown time Yes Geoffrey Menendez MD   atenolol (TENORMIN) 100 MG tablet Take 1 tablet (100 mg) by mouth daily 8/22/2021 at Unknown time Yes Yocasta Hernandez MD   cholecalciferol (VITAMIN D3) 1000 UNIT tablet Take 1 tablet (1,000 Units) by mouth daily 8/22/2021 at Unknown time Yes Geoffrey Menendez MD   diclofenac (VOLTAREN) 1 % topical gel Apply  topically daily as needed for moderate pain prn at prn Yes Unknown, Entered By History   doxycycline (VIBRAMYCIN) 100 MG capsule Take 1 capsule by mouth 2 times daily. 8/22/2021 at Unknown time Yes Yocasta Hernandez MD   ferrous sulfate (IRON) 325 (65 Fe) MG tablet Take 1 tablet (325 mg) by mouth daily (with breakfast) 8/22/2021 at Unknown time Yes Geoffrey Menendez MD   gabapentin (NEURONTIN) 100 MG capsule Take 2 capsules (200 mg) by mouth every morning 8/22/2021 at Unknown time Yes Alberto Taveras MD   gabapentin (NEURONTIN) 800 MG tablet Take 1 tablet (800 mg) by mouth 3 times daily 8/22/2021 at Unknown time Yes Alberto Taveras MD   HEMP OIL OR EXTRACT OR OTHER CBD CANNABINOID, NOT MEDICAL CANNABIS, Take 1 tablet by mouth daily CBD gummy 8/22/2021 at Unknown time Yes Unknown, Entered By History   HYDROmorphone (DILAUDID) 2 MG tablet TAKE 1 TABLET BY MOUTH EVERY 4 HOURS AS NEEDED FOR 15 DAYS 8/22/2021 at Unknown time Yes Reported, Patient   hydroxychloroquine (PLAQUENIL) 200 MG tablet Take 1 tablet by mouth 2 times daily. 8/22/2021 at Unknown time Yes Yocasta Hernandez MD   IBANdronate (BONIVA) 150 MG tablet Take 1 tablet (150 mg) by mouth every 30 days Take 60 minutes before am meal with 8 oz. water. Remain upright for 60 minutes. 8/22/2021 at Unknown time Yes Yocasta Hernandez MD   ibuprofen (ADVIL/MOTRIN) 200 MG tablet Take 200 mg by mouth 2 times daily 8/22/2021 at Unknown time Yes Unknown, Entered By History   levOCARNitine (CARNITOR) 330 MG tablet Take 990 mg by mouth 2 times daily  8/22/2021 at Unknown time Yes Yocasta Hernandez MD   Lidocaine (LIDOCARE) 4 % Patch Place 1 patch onto the skin every 24 hours To prevent lidocaine toxicity, patient should be patch free for 12 hrs daily. 8/22/2021 at Unknown time Yes Brenda Murillo PA-C   medical cannabis (Patient's own supply) See Admin Instructions (The purpose of this order is to document that the patient reports taking medical cannabis.  This is not a prescription,  and is not used to certify that the patient has a qualifying medical condition.) 8/22/2021 at Unknown time Yes Unknown, Entered By History   Melatonin 1 MG CHEW Take 3 mg by mouth At Bedtime 8/21/2021 at Unknown time Yes Reported, Patient   naloxone (NARCAN) 4 MG/0.1ML nasal spray Spray 4 mg into one nostril alternating nostrils once as needed for opioid reversal prn at prn Yes Unknown, Entered By History   potassium chloride ER (KLOR-CON M) 20 MEQ CR tablet Take 20 mEq by mouth 8/22/2021 at Unknown time Yes Reported, Patient   predniSONE (DELTASONE) 5 MG tablet Take 5 mg by mouth daily  8/22/2021 at Unknown time Yes Reported, Patient   amLODIPine (NORVASC) 5 MG tablet TAKE 1 & 1/2 (ONE & ONE-HALF) TABLETS BY MOUTH ONCE DAILY   Yocasta Hernandez MD   losartan (COZAAR) 100 MG tablet Take 1 tablet (100 mg) by mouth daily  Yes Yocasta Hernandez MD       The information provided in this note is only as accurate as the sources available at the time of update(s)

## 2021-08-22 NOTE — ED NOTES
Bed: ED13  Expected date:   Expected time:   Means of arrival:   Comments:  1851  81 F NH pt fell yesterday/50 mcg fentanyl  1041

## 2021-08-22 NOTE — PROVIDER NOTIFICATION
Paged hospitalist to ask if patient should have IVFs?  Daughter says patient is not taking amlodipine anymore.

## 2021-08-22 NOTE — ED NOTES
[facility]  ED Nurse Handoff Report    ED Chief complaint: Fall      ED Diagnosis:   Final diagnoses:   Hyponatremia   Fall, initial encounter   Multiple bruises       Code Status: Full Code    Allergies:   Allergies   Allergen Reactions     Ace Inhibitors Cough     Latex GI Disturbance     lip swelling     Liquid Adhesive Rash     Tape [Adhesive Tape] Rash       Patient Story: Pt from assisted living home and slid down during a transfer last night. Family wanted pt to come in as she has had many falls the last 6 months.  Focused Assessment:  Pt A/O, can make needs known. Pt. Recalls events of last evening and denies LOC or head injury. Pt. In chronic afib, denies chest pain. Pt has pain with movement, all over body. Pt has eccymotic areas on legs, shoulders and arms.    Treatments and/or interventions provided: IV labs, CT, XRAY, bolus fluid, straight cath urine UA  Patient's response to treatments and/or interventions: Tolerated well    To be done/followed up on inpatient unit:  Monitor    Does this patient have any cognitive concerns?: None    Activity level - Baseline/Home:  Stand with Assist  Activity Level - Current:   Stand with assist x2    Patient's Preferred language: English   Needed?: No    Isolation: None  Infection: Not Applicable  Patient tested for COVID 19 prior to admission: YES  Bariatric?: No    Vital Signs:   Vitals:    08/22/21 1410 08/22/21 1415 08/22/21 1430 08/22/21 1445   BP: (!) 154/81  (!) 167/84 137/78   Pulse:  63 63 65   Resp:       Temp:       TempSrc:       SpO2:  94% 95% 93%   Weight:       Height:           Cardiac Rhythm:Cardiac Rhythm: Atrial fibrillation    Was the PSS-3 completed:   No -  What interventions are required if any?               Family Comments: Daughter and son at bedside.  OBS brochure/video discussed/provided to patient/family: N/A              Name of person given brochure if not patient: NA              Relationship to patient:  NA    For the majority of the shift this patient's behavior was Green.   Behavioral interventions performed were None.    ED NURSE PHONE NUMBER: [phone]

## 2021-08-22 NOTE — ED TRIAGE NOTES
Pt comes from NH via ambulance for slide down to the floor during transfer. Pt. Denies LOC, hitting head. Is on thinners and chronic afib. Pt. Has multiple ecchymosis on legs and arms. Family on the way for support.

## 2021-08-22 NOTE — H&P
[facility]    History and Physical - Hospitalist Service       Date of Admission:  8/22/2021    Assessment & Plan      Nicolás Wyman is a 81 year old female with numerous medical problems including hypertension, rheumatoid arthritis, long-term steroid use, remote history of non-Hodgkin's lymphoma (diagnosed in 2014), interstitial pulmonary fibrosis, SIADH, compression fracture of L3, lumbar spinal stenosis, osteoporosis who is admitted on 8/22/2021. She has worsening weakness, especially in her legs, had a minor fall yesterday.    Principal Problem:    Weakness    [place] to observation    I discussed with her family that her chronic, progressive weakness is likely multifactorial and would not be expected to resolve even as sodium levels improve.  Patient very likely needs a higher level of care, including long-term care, daughter says they have discussed this and that her mother is generally accepting of the idea    PT, OT, social work consults requested    Active Problems:    Hyponatremia    SIADH (syndrome of inappropriate ADH production) (H)     Patient's sodium values going back many months (December/2020) range from 116-125 mEq/L    She was seen by nephrology during December/2020 stay at [facility] where elevated urine osmolality suggested SIADH    Fluid restriction was recommended, daughter says they recently encouraged patient to drink additional fluids, thinking it may help her weakness    Resume fluid restriction    Recheck sodium level    If patient is felt to have symptomatic hyponatremia which fails to improve with fluid restriction, could consider adding salt tablets    Essential hypertension, benign    Patient says she was on amlodipine but this has been discontinued    Continue beta-blocker    Continue angiotensin receptor blocker    Follow blood pressure readings closely    Rheumatoid arthritis (HCC)    This is longstanding, patient says he was diagnosed more than  "40 years ago and she does not have significant pain related to her arthritis    Continue hydroxychloroquine, ibuprofen, doxycycline    Continue low-dose prednisone    History of non-Hodgkin's lymphoma     Receives care at the [facility]    Last hematology visit at [place] was 7/22/2020 and notes indicate, \"May 2004 had left axillary nodes.  Biopsy showed low-grade B-cell lymphoma, plasmacytic differentiation.  There was widespread joshua involvement, especially the left groin, and skin involvement.  Also biopsied was some skin consistent with her connective tissue disease.  She got Rituxan and bendamustine.  Completed four cycles in September 2014.  Coming up to three and a half years ago.  She was then observed.  In January 2016 she got a lesion on her left thigh.  Biopsy showed low-grade lymphoma.  It was radiated by Dr. Cadena.  Followup PET in 2016 October showed a good response  When I saw her in April of 2019 there was a question of uptake in the left leg. A biopsy was done and showed recurrent lymphoma in that area and I asked her radiotherapist to do local radiation to that area which was completed in July of 2019.  She would now be 1 year out from the last radiation therapy. She has now had a cycle of chemotherapy and 2 separate instances of local radiation in her PET scan now is been doing very well for a year.\"    Follow-up in spring, 2021 was recommended, patient could not attend that appointment    Suggest that they make a virtual follow-up visit with [place] hematology    Lumbar compression fracture, sequela    Osteoporosis    Spinal stenosis of lumbar region    Lumbar T CT shows a high-grade compression fracture with fragmentation at L3 with progressive loss of height contributing to severe spinal stenosis    Daughter again asks if there are any treatments available.  We discussed that patient's numerous medical problems, age and debility likely make the risk of any surgical option prohibitive.    Ortho " "consult requested, because of progressive weakness.  This likely represents slow, chronic advancement of pain and debility from her original compression fracture injury        Diet: Regular Diet Adult  Fluid restriction 2000 ML FLUID    DVT Prophylaxis: Pneumatic Compression Devices  Crocker Catheter: Not present  Central Lines: None  Code Status: Full Code      Clinically Significant Risk Factors Present on Admission        # Platelet Defect: home medication list includes an antiplatelet medication      Disposition Plan   Expected discharge: tomorrow recommended to long term care once safe disposition plan/ TCU bed available and Ortho evaluation completed.     The patient's care was discussed with the Bedside Nurse, Patient and Patient's Family.    Cady Rivas MD  [facility]  Securely message with the Vocera Web Console (learn more here)  Text page via Immediately Paging/Directory      ______________________________________________________________________    Chief Complaint   \"That low sodium is making my legs weak.  I could not even get myself out of bed.\"    History of Present Illness   Nicolás Wyman is a 81 year old female  with numerous medical problems including hypertension, rheumatoid arthritis, long-term steroid use, remote history of non-Hodgkin's lymphoma (diagnosed in 2014), interstitial pulmonary fibrosis, SIADH, compression fracture of L3, lumbar spinal stenosis, osteoporosis who presents the emergency department after a minor fall complaining of worsening weakness in her legs.    Patient sustained an L3 lumbar compression fracture in November, 2020.  She has chronic pain and weakness in her legs as a result.  She was admitted to [facility] in December, 2020 for weakness originally thought to be UTI and hyponatremia.  Notes indicate that her sodium levels improved but she had ongoing weakness and was unable to stand on her own.  She had a lengthy stay at [facility] care unit, followed by a respite stay and subsequently returned home to a care facility.    She returns saying she fell out of bed last night.  She did not suffer any real injury but she says she is unable to care for herself, cannot stand and walk.  Notes going back to February, 2021 indicates she was having progressive weakness and was unable to stand then, changes are gradual.    Daughter brought the patient to the emergency department for evaluation today where labs again show hyponatremia.  She is registered to observation.    Review of Systems    The 10 point Review of Systems is negative other than noted in the HPI or here.     Past Medical History    I have reviewed this patient's medical history and updated it with pertinent information if needed.   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    removal right TKA 6/12     Lumbar compression fracture, sequela 6/14/2021     Non Hodgkin's lymphoma (H) 6/14    chemo x4, clearing by PET     Osteoporosis 6/14/2021     Other specified glaucoma 6/14/2021     Pulmonary interstitial fibrosis (H)      Rheumatoid arthritis(714.0)     [facility]     SIADH (syndrome of inappropriate ADH production) (H) 6/14/2021     Spinal stenosis of lumbar region 6/14/2021     Steroid long-term use      Urinary retention 6/14/2021       Past Surgical History   I have reviewed this patient's surgical history and updated it with pertinent information if needed.  Past Surgical History:   Procedure Laterality Date     ARTHROPLASTY KNEE  11/7/2011    Procedure:ARTHROPLASTY KNEE; Right Total Knee Arthroplasty  [facility];  Surgeon:DIVINA BAUTISTA; Location:[facility] OR     ARTHROPLASTY REVISION KNEE  12/29/2011    Procedure:ARTHROPLASTY REVISION KNEE; Irrigation and debridement, right total knee arthroplasty with polyethylene exchange.  ; Surgeon:DIVINA BAUTISTA; Location:[facility] OR     C VAGINAL HYSTERECTOMY  1981    has ovaries      EXCHANGE POLY COMPONENT ARTHROPLASTY KNEE  11/26/2011    Procedure:EXCHANGE POLY COMPONENT ARTHROPLASTY KNEE; EXCHANGE POLY COMPONENT ARTHROPLASTY KNEE RIGHT, irrigation and debridement right knee; Surgeon:FELIPE TAYLOR; Location:[facility] OR     HC CORRECT BUNION,SIMPLE  2002     HC REMOVAL GALLBLADDER  1981     IRRIGATION AND DEBRIDEMENT KNEE, COMBINED  11/26/2011    Procedure:COMBINED IRRIGATION AND DEBRIDEMENT KNEE; Surgeon:FELIPE TAYLOR; Location:[facility] OR     IRRIGATION AND DEBRIDEMENT KNEE, COMBINED  12/29/2011    Procedure:COMBINED IRRIGATION AND DEBRIDEMENT KNEE; Surgeon:DIVINA BAUTISTA; Location:[facility] OR     IRRIGATION AND DEBRIDEMENT KNEE, COMBINED  2/9/2012    Procedure:COMBINED IRRIGATION AND DEBRIDEMENT KNEE; Wound Debridement Right Knee with Placement of Wound Vac; Surgeon:DIVINA BAUTISTA; Location:[facility] OR     ZZC NONSPECIFIC PROCEDURE  6/22/12    removal of right TKA       Social History   I have reviewed this patient's social history and updated it with pertinent information if needed.  Social History     Tobacco Use     Smoking status: Never Smoker     Smokeless tobacco: Never Used   Substance Use Topics     Alcohol use: No     Drug use: No       Family History   I have reviewed this patient's family history and updated it with pertinent information if needed.  Family History   Problem Relation Age of Onset     Heart Disease Mother      Hypertension Mother      Eye Disorder Mother         macular degen     Family History Negative Father        Prior to Admission Medications   Prior to Admission Medications   Prescriptions Last Dose Informant Patient Reported? Taking?   HEMP  OIL OR EXTRACT OR OTHER CBD CANNABINOID, NOT MEDICAL CANNABIS, 8/22/2021 at Unknown time Nursing Home Yes Yes   Sig: Take 1 tablet by mouth daily CBD gummy   HYDROmorphone (DILAUDID) 2 MG tablet 8/22/2021 at Unknown time Nursing Home Yes Yes   Sig: TAKE 1 TABLET BY MOUTH EVERY 4 HOURS AS NEEDED FOR 15 DAYS   IBANdronate (BONIVA) 150 MG tablet 8/22/2021 at Unknown time [facility] No Yes   Sig: Take 1 tablet (150 mg) by mouth every 30 days Take 60 minutes before am meal with 8 oz. water. Remain upright for 60 minutes.   Lidocaine (LIDOCARE) 4 % Patch 8/22/2021 at Unknown time [facility] No Yes   Sig: Place 1 patch onto the skin every 24 hours To prevent lidocaine toxicity, patient should be patch free for 12 hrs daily.   Melatonin 1 MG CHEW 8/21/2021 at Unknown time Nursing Home Yes Yes   Sig: Take 3 mg by mouth At Bedtime   acetaminophen (TYLENOL) 325 MG tablet 8/22/2021 at Unknown time [facility] No Yes   Sig: Take 3 tablets (975 mg) by mouth 3 times daily   acetylcysteine (N-ACETYL CYSTEINE) 600 MG CAPS capsule 8/22/2021 at Unknown time Nursing Home Yes Yes   Sig: Take 1 capsule (600 mg) by mouth daily   albuterol (PROAIR HFA/PROVENTIL HFA/VENTOLIN HFA) 108 (90 Base) MCG/ACT inhaler prn at prn Nursing Home Yes Yes   Sig: Inhale 2 puffs into the lungs every 4 hours as needed for shortness of breath / dyspnea or wheezing   amLODIPine (NORVASC) 5 MG tablet  Nursing Home No No   Sig: TAKE 1 & 1/2 (ONE & ONE-HALF) TABLETS BY MOUTH ONCE DAILY   aspirin 81 MG tablet 8/22/2021 at Unknown time Nursing Home Yes Yes   Sig: Take 1 tablet (81 mg) by mouth daily   atenolol (TENORMIN) 100 MG tablet 8/22/2021 at Unknown time [facility] No Yes   Sig: Take 1 tablet (100 mg) by mouth daily   cholecalciferol (VITAMIN D3) 1000 UNIT tablet 8/22/2021 at Unknown time Nursing Home Yes Yes   Sig: Take 1 tablet (1,000 Units) by mouth daily   diclofenac (VOLTAREN) 1 % topical gel prn at prn Nursing Home Yes Yes   Sig: Apply topically  daily as needed for moderate pain   doxycycline (VIBRAMYCIN) 100 MG capsule 8/22/2021 at Unknown time shelter No Yes   Sig: Take 1 capsule by mouth 2 times daily.   ferrous sulfate (IRON) 325 (65 Fe) MG tablet 8/22/2021 at Unknown time Nursing Home Yes Yes   Sig: Take 1 tablet (325 mg) by mouth daily (with breakfast)   gabapentin (NEURONTIN) 100 MG capsule 8/22/2021 at Unknown time Nursing Home Yes Yes   Sig: Take 2 capsules (200 mg) by mouth every morning   gabapentin (NEURONTIN) 800 MG tablet 8/22/2021 at Unknown time Nursing Home Yes Yes   Sig: Take 1 tablet (800 mg) by mouth 3 times daily   hydroxychloroquine (PLAQUENIL) 200 MG tablet 8/22/2021 at Unknown time Nursing Home Yes Yes   Sig: Take 1 tablet by mouth 2 times daily.   ibuprofen (ADVIL/MOTRIN) 200 MG tablet 8/22/2021 at Unknown time Nursing Home Yes Yes   Sig: Take 200 mg by mouth 2 times daily   levOCARNitine (CARNITOR) 330 MG tablet 8/22/2021 at Unknown time Nursing Home Yes Yes   Sig: Take 990 mg by mouth 2 times daily    losartan (COZAAR) 100 MG tablet 8/22/2021 at Unknown time shelter No Yes   Sig: Take 1 tablet (100 mg) by mouth daily   medical cannabis (Patient's own supply) 8/22/2021 at Unknown time Nursing Home Yes Yes   Sig: See Admin Instructions (The purpose of this order is to document that the patient reports taking medical cannabis.  This is not a prescription, and is not used to certify that the patient has a qualifying medical condition.)   naloxone (NARCAN) 4 MG/0.1ML nasal spray prn at prn Nursing Home Yes Yes   Sig: Spray 4 mg into one nostril alternating nostrils once as needed for opioid reversal   potassium chloride ER (KLOR-CON M) 20 MEQ CR tablet 8/22/2021 at Unknown time Nursing Home Yes Yes   Sig: Take 20 mEq by mouth   predniSONE (DELTASONE) 5 MG tablet 8/22/2021 at Unknown time Nursing Home Yes Yes   Sig: Take 5 mg by mouth daily       Facility-Administered Medications: None     Allergies   Allergies   Allergen  "Reactions     Ace Inhibitors Cough     Latex GI Disturbance     lip swelling     Liquid Adhesive Rash     Tape [Adhesive Tape] Rash       Physical Exam   Vital Signs: Temp: 98.1  F (36.7  C) Temp src: Oral BP: (!) 182/84 Pulse: 64   Resp: 20 SpO2: 94 % O2 Device: None (Room air)    Weight: 180 lbs 0 oz    Constitutional: Awake, alert, cooperative, no apparent distress.  Eyes: Conjunctiva and pupils examined and normal.  HEENT: Moist mucous membranes.  She has an upper partial denture  Respiratory: Clear to auscultation bilaterally, no crackles or wheezing.  Cardiovascular: Regular rate and rhythm, with murmur in systole  GI: Soft, non-distended, non-tender, normal bowel sounds.  Lymph/Hematologic: No anterior cervical or supraclavicular adenopathy.  Skin: He has multiple ecchymoses in various stages of healing, including on the dorsum of the right foot  Musculoskeletal: Very prominent ulnar drift bilaterally with chronic enlargement of all MCPs  Neurologic: Moves arms easily.  Cannot lift either leg off the bed.  Psychiatric: Alert, seems mildly confused (\"are you the one who is going to get me into this hotel?\")      Data   Data reviewed today: I reviewed all medications, new labs and imaging results over the last 24 hours. I personally reviewed the chest x-ray image(s) showing No infiltrates and the Knee x-rays showing prior patellar resurfacing, also right shoulder x-ray showing no fracture, noncontrast head CT showing mild chronic ischemic changes and lumbar spine CT showing high-grade compression fracture with fragmentation at L3, with progression of loss of height from the most available recent study dated 12/16/2020.    Recent Labs   Lab 08/22/21  1211 08/19/21  0516   WBC 6.7  --    HGB 12.9  --    MCV 95  --      --    * 125*   POTASSIUM 4.5 4.0   CHLORIDE 83* 86*   CO2 32 33*   BUN 8 6*   CR 0.53 0.50*   ANIONGAP 7 6   KRISTOPHER 8.8 8.5   * 73   ALBUMIN 3.2*  --    PROTTOTAL 5.9*  --  "   BILITOTAL 0.6  --    ALKPHOS 44  --    ALT 36  --    AST 18  --      Recent Results (from the past 24 hour(s))   XR Knee Bilateral G/E 4 Views    Narrative    EXAM: XR KNEE BILATERAL G/E 4 VW  LOCATION: [facility]  DATE/TIME: 8/22/2021 12:31 PM    INDICATION: check for fracture, pain after fall  COMPARISON: None.      Impression    IMPRESSION:   RIGHT KNEE: TKA with patellar resurfacing, longstem femoral and tibial components. Normal patellar alignment. No knee joint effusion. No fractures are evident.    LEFT KNEE: TKA with patellar resurfacing. No knee joint effusion. No fractures are evident.   XR Shoulder Right G/E 3 Views    Narrative    EXAM: XR SHOULDER RIGHT G/E 3 VIEWS  LOCATION: [facility]  DATE/TIME: 08/22/2021, 12:31 PM    INDICATION: Check for fracture- pain after fall.  COMPARISON: None.    FINDINGS: There is a 0.8 cm calcific/ossific focus adjacent to the humeral head, which could represent mild rotator cuff region calcific tendinitis/bursitis. Otherwise, unremarkable.      Impression    IMPRESSION: No fracture identified.      XR Chest 2 Views    Narrative    CHEST TWO VIEWS 8/22/2021 1:28 PM     HISTORY: Fall, weakness, check for pneumothorax or pneumonia.    COMPARISON: 9/8/2015    FINDINGS: No airspace consolidation, pneumothorax, or pleural  effusion.       Impression    IMPRESSION: No radiographic evidence of acute chest abnormality.     CHRISTA ASTORGA MD         SYSTEM ID:  [id number]   Lumbar spine CT w/o contrast    Narrative    CT OF THE LUMBAR SPINE WITHOUT CONTRAST  8/22/2021 1:56 PM     COMPARISON: 12/16/2020    HISTORY: Worsening of chronic low back pain after fall, past history  of compression fracture.     TECHNIQUE: Axial images of the lumbar spine were acquired without  intravenous contrast. Multiplanar reformations were created from the  axial source images.      FINDINGS:  5 lumbar type vertebral bodies are presumed for  counting  purposes. High-grade compression fracture L3. This has progressed from  the previous study where there was mild to moderate superior endplate  compression at L3. Today's appearance shows fragmentation superior and  inferior endplate, retropulsion, and anterior displacement and  fragmentation of the L3 vertebral body. Fracture lines do involve the  pedicles bilaterally. Loss of height is about 85-90%. This is more  prominent to the left of midline with respect to height loss series 9  image 21. Fracture of the left pedicle, lamina and transverse process  is incomplete at L3 series 7 image 55. The retropulsion contributes to  severe spinal stenosis at L3. Mild loss of height inferior endplate L2  appears chronic stable from prior study including the morphology of  the anterior inferior cortex series 10 image 29. Otherwise vertebral  body height is satisfactory. There is stable 2.5 mm anterior  subluxation L4-5. Degenerative interspace narrowing upper and lower  lumbar levels. Demineralization. Satisfactory sacral alignment. Facet  joint spondylosis is noted. Gaseous vacuum phenomenon at L2-3 L3-4.  Marked interspace narrowing L5-S1. Leftward lumbar curve apex L2.  Sacral neural foramina are intact. No evidence for displaced lower rib  fractures.    There is no evidence for retroperitoneal solid mass or adenopathy.  There is minimal edema suggested about L3 which may indicate a  subacute time course for this progressive compression at L3.  Satisfactory and symmetric psoas appearance otherwise. Distention of  the urinary bladder. Vascular calcification. No retroperitoneal  hematoma is identified.    There is no additional evidence for severe spinal stenosis, with  moderate spinal canal narrowing at L4-5. At the L2-3 level there is  moderate foraminal narrowing present on the right and mild foraminal  narrowing on the left. At L3-4, there is severe left-sided foraminal  stenosis and possible correlation to left  L3 radiculopathy due to  contact of exiting left L3 nerve root series 8 image 52. There is also  moderate to severe left L4 subarticular recess narrowing. Mild to  moderate right-sided L3-4 foraminal stenosis. No additional severe  foraminal stenosis.      Impression    IMPRESSION:  1. High grade compression fracture with fragmentation at L3 is  demonstrated, with progression of the loss of height from the plain  from study which is most recent available comparison, performed  12/16/2020. Fracture lines involve the posterior elements bilaterally,  more prominent on the left with incomplete fracture involving the left  pedicle and lamina and transverse process at L3. There is stable  chronic mild loss of height and endplate irregularity inferiorly at  L2. No additional or new compressions are noted elsewhere in the  lumbar spine.  2. Loss of height at L3 85-90% with retropulsion contributing to  severe spinal stenosis. There is also severe left L3-4 foraminal  compromise and moderate to severe narrowing left L4 subarticular  recess.  3. There is no evidence for epidural hematoma with minimal paraspinous  edema. Acute or subacute in time course please correlate with the  clinical history and presentation.  4. Additional details above.      Radiation dose for this scan was reduced using automated exposure  control, adjustment of the mA and/or kV according to patient size, or  iterative reconstruction technique    VIANEY GARZA MD         SYSTEM ID:  [id number]   Head CT w/o contrast    Narrative    CT SCAN OF THE HEAD WITHOUT CONTRAST   8/22/2021 1:56 PM     HISTORY: Head trauma, minor (Age >= 65y); fall, weakness.    TECHNIQUE:  Axial images of the head and coronal reformations without  IV contrast material. Radiation dose for this scan was reduced using  automated exposure control, adjustment of the mA and/or kV according  to patient size, or iterative reconstruction technique.    COMPARISON: None.    FINDINGS:      Intracranial contents: Mild chronic ischemic change periventricular  deep white matter both cerebral hemispheres. Corpus callosum is  satisfactory. No extra-axial blood products or fluid collection.  Vascular calcification. Position of cerebellar tonsils is  satisfactory. Sella shows no acute abnormality. There is no evidence  of intracranial hemorrhage, mass, acute infarct or anomaly.    Visualized orbits/sinuses/mastoids:  The visualized portions of the  sinuses and mastoids appear normal. No acute orbital process.    Osseous structures/soft tissues:  No fracture of the calvarium or  skull base. Degenerative changes involve the TMJs bilaterally. No  significant swelling of the facial or scalp tissues is evident.      Impression    IMPRESSION: Mild chronic ischemic changes intracranially as above. No  mass, mass effect or evidence for acute/evolving infarction is noted.      VIANEY GARZA MD         SYSTEM ID:  [id number]

## 2021-08-22 NOTE — ED PROVIDER NOTES
History     Chief Complaint:  Fall     HPI   Nicolás IRAM Wyman is a 81 year old female with history of non hodgkin's lymphoma, COPD, hypertension, RA, who presents for evaluation after a fall. The patient reports that last night she fell out of bed in the middle of the night, thinking that she likely rolled out of bed. The patient is unsure if she hit her head during the fall, but has pain and bruising to her right arm and shoulder. She also has bruising to her bilateral arms and legs, and she thinks some of this is new from her fall last night, but some of it also being old as she states she falls frequently. The patient reports feeling weaker over the past few days and that she could hardly walk a few days ago, but states this has been intermittent over the past year. The patient denies headaches, fever, chills, dysuria. She takes a baby aspirin daily but no other blood thinners.     Her daughter reports that after the patient's fall last night, she was unable to ambulate due to pain and weakness this morning therefore prompting her transfer to the ED. She notes though that the weakness has been progressively worsening for several weeks now and the patient just started medical marijuana (tablets) 1 week ago for chronic pain which has made her weaker. The patient reportedly sustains frequent falls and has chronic back pain that radiates into her legs. The patient had a fracture to her back last year which she was placed in a TCU and is now in a heightened assisted living facility, but her family believes that she may require a higher level of care. She no longer attends physical therapy due to insurance reasons and her daughter states that she has lost all the progress she made at the TCU since being released in May.     Review of Systems   Constitutional: Negative for chills and fever.   Genitourinary: Negative for dysuria.   Musculoskeletal: Positive for back pain (chronic).        Right shoulder pain  Leg pain  "  Skin:        Bruising bilateral arms/legs   Neurological: Positive for weakness. Negative for headaches.   All other systems reviewed and are negative.      Allergies:  Ace Inhibitors  Latex  Liquid Adhesive  Tape    Medications:  acetylcysteine   Norvasc   aspirin   atenolol  ferrous sulfate   gabapentin  hydroxychloroquine  Ibandronate   levocarnitine  Prednisone   losartan   Melatonin  potassium chloride ER  Aspirin    Past Medical History:    Anesthesia complication   Discoid lupus   Hypertension   Glaucoma   Idiopathic interstitial pneumonia   COPD   Anemia   Anxiety   Infected prosthetic knee joint   Lumbar compression fracture  Non Hodgkin's lymphoma    Osteoporosis   Pulmonary interstitial fibrosis    Rheumatoid arthritis  Syndrome of inappropriate ADH production   Spinal stenosis of lumbar region   Steroid long-term use   Urinary retention     Past Surgical History:    Arthroplasty knee   Hysterectomy  Arthroplasty revision knee  Exchange poly component arthroplasty knee  Bunion surgery  Choleystectomy  I&D knee      Family History:    Mother: heart disease, hypertension, macular degeneration    Social History:  The patient was accompanied to the ED by EMS.  Patient lives in an assisted home.  Patient has a daughter and son.    Physical Exam     Patient Vitals for the past 24 hrs:   BP Temp Temp src Pulse Resp SpO2 Height Weight   08/22/21 1445 137/78 -- -- 65 -- 93 % -- --   08/22/21 1430 (!) 167/84 -- -- 63 -- 95 % -- --   08/22/21 1415 -- -- -- 63 -- 94 % -- --   08/22/21 1410 (!) 154/81 -- -- -- -- -- -- --   08/22/21 1325 (!) 147/87 -- -- 61 -- 97 % -- --   08/22/21 1114 137/67 98.8  F (37.1  C) Oral 67 20 96 % 1.499 m (4' 11\") 81.6 kg (180 lb)   08/22/21 1105 137/67 -- -- 71 -- 92 % -- --     Physical Exam    Nursing note and vitals reviewed.  Constitutional:  Appears well-developed and well-nourished.   HENT:   Head:    Atraumatic.   Mouth/Throat:   Oropharynx is clear and moist. No oropharyngeal " exudate.   Eyes:    Pupils are equal, round, and reactive to light.   Neck:    Normal range of motion. Neck supple. Non tender.      No tracheal deviation present. No thyromegaly present.   Cardiovascular:  Normal rate, regular rhythm, no murmur   Pulmonary/Chest: Breath sounds are clear and equal without wheezes or crackles.  Abdominal:   Soft. Bowel sounds are normal. Exhibits no distension and      no mass. There is no tenderness.      There is no rebound and no guarding.   Musculoskeletal:  Exhibits no edema.     Pain with range of motion of both knees with ecchymosis of the anterior aspects of both proximal tibias.     Pain with range of motion of right shoulder with dark purple bruising over the proximal humerus.      Back: tenderness to the lumbar spine diffusely with no step off. thoracic spine non tender.   Lymphadenopathy:  No cervical adenopathy.   Neurological:   Alert and oriented to person, place, and time. GCS 15.  CN 2-12 intact.  and proximal upper extremity strength strong and equal.  Bilateral lower extremity strength strong and equal, including strong dorsiflexion and plantarflexion strength.  Sensation intact and equal to the face, arms and legs.  No facial droop or weakness. Normal speech.  Follows commands and answers questions normally.     Skin:    Skin is warm and dry. No rash noted. No pallor.      Dark purple bruising to the right shoulder and both knees.      Emergency Department Course     ECG:  ECG taken at 1203, ECG read at 1205  Normal sinus rhythm  Normal ECG  Rate 66 bpm. ND interval 126 ms. QRS duration 86 ms. QT/QTc 428/448 ms. P-R-T axes 44 -18 34.    Imaging:    Head CT w/o contrast  Mild chronic ischemic changes intracranially as above. No  mass, mass effect or evidence for acute/evolving infarction is noted.  VIANEY GARZA MD   Reading per radiology    Lumbar spine CT w/o contrast  1. High grade compression fracture with fragmentation at L3 is  demonstrated, with  progression of the loss of height from the plain  from study which is most recent available comparison, performed  12/16/2020. Fracture lines involve the posterior elements bilaterally,  more prominent on the left with incomplete fracture involving the left  pedicle and lamina and transverse process at L3. There is stable  chronic mild loss of height and endplate irregularity inferiorly at  L2. No additional or new compressions are noted elsewhere in the  lumbar spine.  2. Loss of height at L3 85-90% with retropulsion contributing to  severe spinal stenosis. There is also severe left L3-4 foraminal  compromise and moderate to severe narrowing left L4 subarticular  recess.  3. There is no evidence for epidural hematoma with minimal paraspinous  edema. Acute or subacute in time course please correlate with the  clinical history and presentation.  4. Additional details above.      Radiation dose for this scan was reduced using automated exposure  control, adjustment of the mA and/or kV according to patient size, or  iterative reconstruction technique    VIANEY GARZA MD   Reading per radiology    XR Chest 2 Views  No radiographic evidence of acute chest abnormality.   CHRISTA ASTORGA MD   Reading per radiology    XR Shoulder Right G/E 3 Views  Final Result  IMPRESSION: No fracture identified.  Reading per radiology     XR Knee Bilateral G/E 4 Views  RIGHT KNEE: TKA with patellar resurfacing, longstem femoral and tibial components. Normal patellar alignment. No knee joint effusion. No fractures are evident.    LEFT KNEE: TKA with patellar resurfacing. No knee joint effusion. No fractures are evident.    Reading per radiology     Laboratory:    Asymptomatic COVID-19 Virus (Coronavirus) by PCR Nasopharyngeal swab: Negative      UA: Protein Albumin 30 (A), Mucous present(A), Hyaline Casts 4 (H), o/w WNL.    CBC: WBC 6.7, HGB 12.9,   CMP: Sodium 122 (L), Chloride 83 (L), Glucose 115 (H), Protein Total 5.9 (L),  Albumin 3.2 (L), o/w WNL (Creatinine 0.53)    Emergency Department Course:    Reviewed:    I reviewed the patient's nursing notes, vitals, past medical records, Care Everywhere.     Assessments:    1119 I performed an exam of the patient as documented above.     1447 Patient rechecked and updated.      Consults:     1444 I spoke with Dr. Rivas of the hospitalist service from [facility] regarding patient's presentation, findings, and plan of care.     Interventions:  1208 NS IV   1208 fentanyl 50 mcg IV     Disposition:  The patient was admitted to the hospital under the care of Dr. Rivas.     Impression & Plan      Medical Decision Making:  Nicolás Wyman is a 81 year old female who presents to the emergency department today for evaluation after a fall. I found this patient to have significant hyponatremia which I found was the cause of her worsening generalized weakness and inability to walk today. There was no sign of any intracranial bleed or skull fracture on her head CT imaging and no sign of any acute compression fracture. She has worsening of her L3 compression fracture since December 2020, resulting in severe spinal stenosis but no acute injuries seen. She does not have any fractures on her shoulder or knees but she does have some bruising. She was admitted to the medical floor under the care of the hospitalist for further evaluation and treatment. There was no sign of infection, UTI, or bleeding.     Diagnosis:    ICD-10-CM    1. Hyponatremia  E87.1    2. Fall, initial encounter  W19.XXXA    3. Multiple bruises  T07.XXXA      Scribe Disclosure:  I, Orla Severson, am serving as a scribe at 11:05 AM on 8/22/2021 to document services personally performed by Theresa Gaviria MD based on my observations and the provider's statements to me.     [facility] EMERGENCY DEPT         Theresa Gaviria MD  08/22/21 9701

## 2021-08-23 NOTE — PROGRESS NOTES
Observation goals PRIOR TO DISCHARGE     Comments: -diagnostic tests and consults completed and resulted: goal not met, spine surgery consulted  -safe disposition plan has been identified: goal not met  Nurse to notify provider when observation goals have been met and patient is ready for discharge: goal not met        Complains of pain ion lower back and bilateral lower extremities. Managed with Dilaudid, tylenol, and Lidocaine. Crocker inserted due to retention. Scattered bruising present. Waiting for spine surgery consult

## 2021-08-23 NOTE — PROGRESS NOTES
[facility]    HOSPITALIST PROGRESS NOTE :   --------------------------------------------------    Date of Admission:  8/22/2021    Cumulative Summary: Nicolás Wyman is a 81 year old female with numerous medical problems including hypertension, rheumatoid arthritis, long-term steroid use, remote history of non-Hodgkin's lymphoma (diagnosed in 2014), interstitial pulmonary fibrosis, SIADH, compression fracture of L3, lumbar spinal stenosis, osteoporosis who is admitted on 8/22/2021. She has worsening weakness, especially in her legs, had a minor fall yesterday.    Assessment & Plan     Generalized weakness; most likely multifactorial secondary to her rheumatoid arthritis, long-term steroid use compression fractures, lumbar spinal stenosis and osteoporosis which are resulting into decrease exercise tolerance and overall deterioration.    --Patient care was assumed this morning, patient was seen and examined.  --Daughter also present at the bedside, discussed with them again that most of her symptoms are chronic and for her progressive weakness is multifactorial and probably would not be expected to resolve even as sodium levels improved completely.  --They were encouraged by her sodium levels improvement.  --Discussed with them that patient will probably need higher level of care including long-term cares, patient and her daughter told me that her current assisted living are not able to help with the level of care that she is requiring right now.  --Physical and Occupational Therapy along with care management and  have been consulted for safe disposition planning.    Hyponatremia  SIADH (syndrome of inappropriate ADH production) (H)   Patient's sodium values going back many months (December/2020) range from 116-125 mEq/L  She was seen by nephrology during December/2020 stay at [facility] where elevated urine osmolality suggested SIADH  Fluid restriction was recommended,  "daughter says they recently encouraged patient to drink additional fluids, thinking it may help her weakness    -- Resume fluid restriction  -- Recheck sodium level at 1400, stable around 125  -- Continue salt tablets , might continue on discharge   --Recheck BMP tomorrow morning    Urinary retention: Patient has already required straight cath twice.  --Discussed with patient and daughter, will place Crocker catheter, start patient on Flomax, patient will be discharged to nursing home then voiding trial can be done in 1 week.    History of non-Hodgkin's lymphoma:  Receives care at the [facility]  Last hematology visit at [place] was 7/22/2020 and notes indicate, \"May 2004 had left axillary nodes.  Biopsy showed low-grade B-cell lymphoma, plasmacytic differentiation.  There was widespread joshua involvement, especially the left groin, and skin involvement.  Also biopsied was some skin consistent with her connective tissue disease.  She got Rituxan and bendamustine.  Completed four cycles in September 2014.  Coming up to three and a half years ago.  She was then observed.  In January 2016 she got a lesion on her left thigh.  Biopsy showed low-grade lymphoma.  It was radiated by Dr. Cadena.  Followup PET in 2016 October showed a good response  When I saw her in April of 2019 there was a question of uptake in the left leg. A biopsy was done and showed recurrent lymphoma in that area and I asked her radiotherapist to do local radiation to that area which was completed in July of 2019.    --She would now be 1 year out from the last radiation therapy. She has now had a cycle of chemotherapy and 2 separate instances of local radiation in her PET scan now is been doing very well for a year.\"  --Follow-up in spring, 2021 was recommended, patient could not attend that appointment  --Suggest that they make a virtual follow-up visit with [place] hematology    Lumbar compression fracture, sequela  Osteoporosis   Spinal stenosis of " lumbar region    --Lumbar T CT shows a high-grade compression fracture with fragmentation at L3 with progressive loss of height contributing to severe spinal stenosis  --Daughter again asks if there are any treatments available.  We discussed that patient's numerous medical problems, age and debility likely make the risk of any surgical option prohibitive.  -- Ortho consult requested, because of progressive weakness.  This likely represents slow, chronic advancement of pain and debility from her original compression fracture injury  --Was evaluated by orthospine, appreciate Dr. Sanchez help.  According to surgery patient has severe osteoporosis and therefore is not a candidate for any instrumented case.  They are not recommending any operative management to address the compression fracture.  Additionally she is not a candidate for kyphoplasty or vertebroplasty due to the posterior element involvement of the high risk of cement extravasation which could cause catastrophic neurological injury.  --Regarding the lower extremity pain, it is possible that it could be managed with a laminectomy L2-L4 however again it is very difficult to differentiate whether or not this is coming from the knee or from the back.  According to Ortho it might be more likely coming from the knees.  --Orthospine is recommending ongoing therapy for deconditioning and immobility.    Essential hypertension, benign:   Patient says she was on amlodipine but this has been discontinued  --Continue beta-blocker  --Continue angiotensin receptor blocker  --Follow blood pressure readings closely    Rheumatoid arthritis (HCC)  This is longstanding, patient says he was diagnosed more than 40 years ago and she does not have significant pain related to her arthritis  --Continue hydroxychloroquine, ibuprofen, doxycycline  --Continue low-dose prednisone    Diet: Regular Diet Adult  Fluid restriction 1200 ML FLUID    Crocker Catheter: PRESENT, indication:  Retention  DVT Prophylaxis: Pneumatic Compression Devices  Code Status: Full Code    The patient's care was discussed with the Bedside Nurse, Patient and Patient's Family.    Disposition Plan   Expected discharge: Date of discharge tomorrow, patient will require higher level of care than assisted living, physical and occupational therapy is posted along with care coordinator.    Zahira Boyd MD, FACP  Text Page (7am - 6pm)    ----------------------------------------------------------------------------------------------------------------------      Interval History   Patient care was assumed this morning, patient was seen and examined, daughter also present at the bedside.  Discussed with them regarding her sodium findings which seems to be improving and plan for salt tablets.  We also discussed about orthospine recommendations.  Patient is also having issues with urinary retention and we discussed about placing Crocker catheter and starting patient on Flomax.  Questions were answered, patient is denying any chest pain, palpitations or shortness of breath.    -Data reviewed today: I reviewed all new labs and imaging results over the last 24 hours.    I personally reviewed no images or EKG's today.    Physical Exam   Temp: 98.3  F (36.8  C) Temp src: Oral BP: (!) 159/66 Pulse: 64   Resp: 18 SpO2: 94 % O2 Device: None (Room air)    Vitals:    08/22/21 1114   Weight: 81.6 kg (180 lb)     Vital Signs with Ranges  Temp:  [97.7  F (36.5  C)-98.3  F (36.8  C)] 98.3  F (36.8  C)  Pulse:  [61-70] 64  Resp:  [18-20] 18  BP: (137-182)/(66-87) 159/66  SpO2:  [93 %-97 %] 94 %  I/O last 3 completed shifts:  In: 400 [P.O.:400]  Out: 900 [Urine:900]    GENERAL: Alert , awake and oriented. NAD. Conversational, appropriate.  Looks chronically sick  HEENT: Normocephalic. EOMI. No icterus or injection. Nares normal.   LUNGS: Clear to auscultation. No dyspnea at rest.   HEART: Regular rate. Extremities perfused.   ABDOMEN: Soft, nontender,  and nondistended. Positive bowel sounds.   EXTREMITIES: Bilateral knee pain pain on palpation with passive range of motion  NEUROLOGIC: Moves extremities x4 on command. No acute focal neurologic abnormalities noted.     Medications       acetaminophen  1,000 mg Oral TID     acetylcysteine  600 mg Oral Daily     atenolol  100 mg Oral Daily     doxycycline hyclate  100 mg Oral BID     ferrous sulfate  325 mg Oral Daily with breakfast     gabapentin  200 mg Oral QAM     gabapentin  800 mg Oral TID     HYDROmorphone  2 mg Oral BID     hydroxychloroquine  200 mg Oral BID     ibuprofen  200 mg Oral BID     levOCARNitine  990 mg Oral BID     Lidocaine  1 patch Transdermal Q24H     losartan  100 mg Oral Daily     potassium chloride ER  20 mEq Oral Daily     predniSONE  5 mg Oral Daily     sodium chloride  1 g Oral BID w/meals     tamsulosin  0.4 mg Oral Daily       Data   Recent Labs   Lab 08/23/21  0700 08/23/21  0252 08/22/21  1950 08/22/21  1211 08/19/21  0516   WBC  --   --   --  6.7  --    HGB  --   --   --  12.9  --    MCV  --   --   --  95  --    PLT  --   --   --  270  --    * 122* 119* 122* 125*   POTASSIUM 3.8  --   --  4.5 4.0   CHLORIDE 87*  --   --  83* 86*   CO2 32  --   --  32 33*   BUN 7  --   --  8 6*   CR 0.44*  --   --  0.53 0.50*   ANIONGAP 6  --   --  7 6   KRISTOPHER 8.4*  --   --  8.8 8.5   GLC 78  --   --  115* 73   ALBUMIN  --   --   --  3.2*  --    PROTTOTAL  --   --   --  5.9*  --    BILITOTAL  --   --   --  0.6  --    ALKPHOS  --   --   --  44  --    ALT  --   --   --  36  --    AST  --   --   --  18  --        Imaging:   Recent Results (from the past 24 hour(s))   XR Knee Bilateral G/E 4 Views    Narrative    EXAM: XR KNEE BILATERAL G/E 4 VW  LOCATION: [facility]  DATE/TIME: 8/22/2021 12:31 PM    INDICATION: check for fracture, pain after fall  COMPARISON: None.      Impression    IMPRESSION:   RIGHT KNEE: TKA with patellar resurfacing, longstem femoral and tibial  components. Normal patellar alignment. No knee joint effusion. No fractures are evident.    LEFT KNEE: TKA with patellar resurfacing. No knee joint effusion. No fractures are evident.   XR Shoulder Right G/E 3 Views    Narrative    EXAM: XR SHOULDER RIGHT G/E 3 VIEWS  LOCATION: [facility]  DATE/TIME: 08/22/2021, 12:31 PM    INDICATION: Check for fracture- pain after fall.  COMPARISON: None.    FINDINGS: There is a 0.8 cm calcific/ossific focus adjacent to the humeral head, which could represent mild rotator cuff region calcific tendinitis/bursitis. Otherwise, unremarkable.      Impression    IMPRESSION: No fracture identified.      XR Chest 2 Views    Narrative    CHEST TWO VIEWS 8/22/2021 1:28 PM     HISTORY: Fall, weakness, check for pneumothorax or pneumonia.    COMPARISON: 9/8/2015    FINDINGS: No airspace consolidation, pneumothorax, or pleural  effusion.       Impression    IMPRESSION: No radiographic evidence of acute chest abnormality.     CHRISTA ASTORGA MD         SYSTEM ID:  [id number]   Lumbar spine CT w/o contrast    Narrative    CT OF THE LUMBAR SPINE WITHOUT CONTRAST  8/22/2021 1:56 PM     COMPARISON: 12/16/2020    HISTORY: Worsening of chronic low back pain after fall, past history  of compression fracture.     TECHNIQUE: Axial images of the lumbar spine were acquired without  intravenous contrast. Multiplanar reformations were created from the  axial source images.      FINDINGS:  5 lumbar type vertebral bodies are presumed for counting  purposes. High-grade compression fracture L3. This has progressed from  the previous study where there was mild to moderate superior endplate  compression at L3. Today's appearance shows fragmentation superior and  inferior endplate, retropulsion, and anterior displacement and  fragmentation of the L3 vertebral body. Fracture lines do involve the  pedicles bilaterally. Loss of height is about 85-90%. This is more  prominent to the left of  midline with respect to height loss series 9  image 21. Fracture of the left pedicle, lamina and transverse process  is incomplete at L3 series 7 image 55. The retropulsion contributes to  severe spinal stenosis at L3. Mild loss of height inferior endplate L2  appears chronic stable from prior study including the morphology of  the anterior inferior cortex series 10 image 29. Otherwise vertebral  body height is satisfactory. There is stable 2.5 mm anterior  subluxation L4-5. Degenerative interspace narrowing upper and lower  lumbar levels. Demineralization. Satisfactory sacral alignment. Facet  joint spondylosis is noted. Gaseous vacuum phenomenon at L2-3 L3-4.  Marked interspace narrowing L5-S1. Leftward lumbar curve apex L2.  Sacral neural foramina are intact. No evidence for displaced lower rib  fractures.    There is no evidence for retroperitoneal solid mass or adenopathy.  There is minimal edema suggested about L3 which may indicate a  subacute time course for this progressive compression at L3.  Satisfactory and symmetric psoas appearance otherwise. Distention of  the urinary bladder. Vascular calcification. No retroperitoneal  hematoma is identified.    There is no additional evidence for severe spinal stenosis, with  moderate spinal canal narrowing at L4-5. At the L2-3 level there is  moderate foraminal narrowing present on the right and mild foraminal  narrowing on the left. At L3-4, there is severe left-sided foraminal  stenosis and possible correlation to left L3 radiculopathy due to  contact of exiting left L3 nerve root series 8 image 52. There is also  moderate to severe left L4 subarticular recess narrowing. Mild to  moderate right-sided L3-4 foraminal stenosis. No additional severe  foraminal stenosis.      Impression    IMPRESSION:  1. High grade compression fracture with fragmentation at L3 is  demonstrated, with progression of the loss of height from the plain  from study which is most recent  available comparison, performed  12/16/2020. Fracture lines involve the posterior elements bilaterally,  more prominent on the left with incomplete fracture involving the left  pedicle and lamina and transverse process at L3. There is stable  chronic mild loss of height and endplate irregularity inferiorly at  L2. No additional or new compressions are noted elsewhere in the  lumbar spine.  2. Loss of height at L3 85-90% with retropulsion contributing to  severe spinal stenosis. There is also severe left L3-4 foraminal  compromise and moderate to severe narrowing left L4 subarticular  recess.  3. There is no evidence for epidural hematoma with minimal paraspinous  edema. Acute or subacute in time course please correlate with the  clinical history and presentation.  4. Additional details above.      Radiation dose for this scan was reduced using automated exposure  control, adjustment of the mA and/or kV according to patient size, or  iterative reconstruction technique    VIANEY GARZA MD         SYSTEM ID:  [id number]   Head CT w/o contrast    Narrative    CT SCAN OF THE HEAD WITHOUT CONTRAST   8/22/2021 1:56 PM     HISTORY: Head trauma, minor (Age >= 65y); fall, weakness.    TECHNIQUE:  Axial images of the head and coronal reformations without  IV contrast material. Radiation dose for this scan was reduced using  automated exposure control, adjustment of the mA and/or kV according  to patient size, or iterative reconstruction technique.    COMPARISON: None.    FINDINGS:     Intracranial contents: Mild chronic ischemic change periventricular  deep white matter both cerebral hemispheres. Corpus callosum is  satisfactory. No extra-axial blood products or fluid collection.  Vascular calcification. Position of cerebellar tonsils is  satisfactory. Sella shows no acute abnormality. There is no evidence  of intracranial hemorrhage, mass, acute infarct or anomaly.    Visualized orbits/sinuses/mastoids:  The visualized  portions of the  sinuses and mastoids appear normal. No acute orbital process.    Osseous structures/soft tissues:  No fracture of the calvarium or  skull base. Degenerative changes involve the TMJs bilaterally. No  significant swelling of the facial or scalp tissues is evident.      Impression    IMPRESSION: Mild chronic ischemic changes intracranially as above. No  mass, mass effect or evidence for acute/evolving infarction is noted.      VIANEY GARZA MD         SYSTEM ID:  [id number]

## 2021-08-23 NOTE — UTILIZATION REVIEW
"  Admission Status; Secondary Review Determination         Under the authority of the Utilization Management Committee, the utilization review process indicated a secondary review on the above patient.  The review outcome is based on review of the medical records, discussions with staff, and applying clinical experience noted on the date of the review.        ()      Inpatient Status Appropriate - This patient's medical care is consistent with medical management for inpatient care and reasonable inpatient medical practice.      (X) Observation Status Appropriate - This patient does not meet hospital inpatient criteria and is placed in observation status. If this patient's primary payer is Medicare and was admitted as an inpatient, Condition Code 44 should be used and patient status changed to \"observation\".   () Admission Status NOT Appropriate - This patient's medical care is not consistent with medical management for Inpatient or Observation Status.          RATIONALE FOR DETERMINATION     81 year old female with a significant past medical history of rheumatoid arthritis, non-Hodgkin's lymphoma, SIADH who presents with worsening low back and leg pain.  She had a lumbar compression fracture noted in December with a slide onto the floor with worsened pain.  CT of the lumber spine showed high grade compression fracture with fragmentation at L3 is  demonstrated, with progression of the loss of height from the plain from study which is most recent available comparison, performed 12/16/2020.  Orthopedics has been consulted with no procedural intervention planned.  She is currently on oral pain medications and Observation status seems appropriate.  I spoke to Dr Boyd.    The severity of illness, intensity of service provided, expected LOS and risk for adverse outcome make the care complex, high risk and appropriate for hospital admission.        The information on this document is developed by the utilization review " team in order for the business office to ensure compliance.  This only denotes the appropriateness of proper admission status and does not reflect the quality of care rendered.         The definitions of Inpatient Status and Observation Status used in making the determination above are those provided in the CMS Coverage Manual, Chapter 1 and Chapter 6, section 70.4.      Sincerely,     Juan Manuel Lake MD  Physician Advisor  Utilization Review/ Case Management  [facility].

## 2021-08-23 NOTE — TELEPHONE ENCOUNTER
Fax received from [facility] 06/05/21 for review and signature.  Put in Dr. Hernandez's in basket.

## 2021-08-23 NOTE — PROGRESS NOTES
Observation goals PRIOR TO DISCHARGE     Comments: -diagnostic tests and consults completed and resulted: Goal not met. Awaiting spine surgery consult to complete.  -safe disposition plan has been identified; Goal not met.    Nurse to notify provider when observation goals have been met and patient is ready for discharge: Goal not met.   C/O pain on lower back and bilateral knees managed with tylenol, dilaudid, and lidocaine patch. Crocker with adequate output. Has scattered bruising in UEs and LEs.

## 2021-08-23 NOTE — PLAN OF CARE
Alert and oriented x 4. Bruising on right upper torso,lower and upper extremities. Complains of back pain and bilateral leg pain. Bladder scan for 969 ml. Unable to pass urine on bedpan. Refused to get out of bed to bsc. Refuses straight cath. Waiting for ortho.

## 2021-08-23 NOTE — PROVIDER NOTIFICATION
Brief update:    Added bladder scan and straight cath for urinary retention    Meng Cruz MD  10:34 PM

## 2021-08-23 NOTE — PROVIDER NOTIFICATION
Hospitalist Addendum    RN paged reporting sodium of 119 mEq/L. Patient has acute on chronic hyponatremia, historical sodium levels 116-125 mEq/L.  Add salt tabs BID (4 doses ordered), recheck sodium level in 4 hours.    Cady Rivas MD  Hospitalist  [facility]  Text Page (7am - 6pm, M-F)

## 2021-08-23 NOTE — PLAN OF CARE
VSS ex HTN, on RA, A/Ox4, , 122, up Ax2 but refusing to get OOB, regular diet with 1200 ml fluid restriction, straight cath'd x1, PRN PO dilaudid given x1 for generalized back and bilat leg pain, constantly requesting repositioning, ortho to evaluate pt, continue to monitor

## 2021-08-23 NOTE — CONSULTS
Orthopedic Surgery Consult / History and Physical    Nicolás IRAM Wyman MRN# [id number]   Age: 81 year old YOB: 1940     Date of Admission:   8/22/2021  Date of Consult: 08/23/21   Location:    [facility]             Assessment and Plan:   Assessment:  1. Chronic L3 compression fracture with severe height collapse, resulting severe central stenosis  2. Bilateral anterior knee, though this appears to be more knee related than radicular in nature as is reproduced with palpation of the knee joints bilaterally and active motion of the knee, and has no associated numbness.  3. Severe osteoporosis  4. No cauda equina syndrome  5. Rheumatoid arthritis on chronic prednisone  6. Per internal medicine, prohibitive risk with operative intervention       Plan:  1. The patient has severe osteoporosis, and therefore is not a candidate for any instrumented case to be able to address the compression fracture itself, as the likelihood of screw pullout or other implant related complications is extremely high.  Therefore would not recommend any operative management to address the compression fracture.  Additionally she is not a candidate for kyphoplasty or vertebroplasty due to the posterior element involvement and the high risk of cement extravasation which could cause catastrophic neurologic injury.  2. Regarding the lower extremity pain, it is possible that this could be managed with a laminectomy L2-4, however again as above is very difficult to differentiate whether or not this is coming from the knee or from the back.  I do think it is more likely coming from the knees.  3. General fatigue: Strength testing the patient is able to Exhibit 5/5 strength in the psoas and quadriceps, as well as distally.  Therefore do not think that neurologic compression is the source of the patient's weakness, more likely due to deconditioning immobility.             Chief Complaint:   Low back pain, bilateral  leg pain           History of Present Illness:   This patient is a 81 year old female with a significant past medical history of rheumatoid arthritis, non-Hodgkin's lymphoma, SIADH who presents with worsening low back and leg pain.    The patient in December sustained a compression fracture of L3.  Since that time she has been living in assisted living.  Unfortunate she had a slide onto the floor during a transfer yesterday, noted that her back pain and leg pain were worse at that time.    On my evaluation, the patient reports that the back pain and leg pain are relatively stable compared to how they have been over the last 8 months.  Notes that she has had more difficulty urinating for 8 months, however there is no new change with regards to this.  Reports no numbness or tingling in the genitals or anus, or in any of the saddle region.  Reports no numbness or tingling down the lower extremity as well.  Reports that when she is laying still she does not experience any pain in the legs.  Does note anterior bilateral knee pain with motion and occasionally at rest, extends somewhat up the thigh and slightly below as well, is unable to determine if it comes from her back or not.    Continues to have focal low back pain.     Symptom Profile  Location of symptoms:   Low back, bilateral anterior knees  Onset:   8 months ago, exacerbation yesterday, but now back to baseline  Duration of symptoms:   8 months  Quality of symptoms:   Sharp and achy  Severity:   Severe  Exacerbating:    Moving and twisting of the back, palpation of the bilateral knees  Alleviate:   Rest            Past Medical History:     Past Medical History:   Diagnosis Date     Anesthesia complication     hypoxia postop     Discoid lupus      Disorder of bone and cartilage, unspecified      Essential hypertension, benign      Glaucoma      History of non-Hodgkin's lymphoma 6/14/2021     Idiopathic interstitial pneumonia 11/02    Pulmonary fibrosis, hypoxia  postop     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    removal right TKA 6/12     Lumbar compression fracture, sequela 6/14/2021     Non Hodgkin's lymphoma (H) 6/14    chemo x4, clearing by PET     Osteoporosis 6/14/2021     Other specified glaucoma 6/14/2021     Pulmonary interstitial fibrosis (H)      Rheumatoid arthritis(714.0)     [facility]     SIADH (syndrome of inappropriate ADH production) (H) 6/14/2021     Spinal stenosis of lumbar region 6/14/2021     Steroid long-term use      Urinary retention 6/14/2021            Past Surgical History:     Past Surgical History:   Procedure Laterality Date     ARTHROPLASTY KNEE  11/7/2011    Procedure:ARTHROPLASTY KNEE; Right Total Knee Arthroplasty  [facility]; Surgeon:DIVINA BAUTISTA; Location:[facility] OR     ARTHROPLASTY REVISION KNEE  12/29/2011    Procedure:ARTHROPLASTY REVISION KNEE; Irrigation and debridement, right total knee arthroplasty with polyethylene exchange.  ; Surgeon:DIVINA BAUTISTA; Location:[facility] OR     C VAGINAL HYSTERECTOMY  1981    has ovaries      EXCHANGE POLY COMPONENT ARTHROPLASTY KNEE  11/26/2011    Procedure:EXCHANGE POLY COMPONENT ARTHROPLASTY KNEE; EXCHANGE POLY COMPONENT ARTHROPLASTY KNEE RIGHT, irrigation and debridement right knee; Surgeon:FELIPE TAYLOR; Location:[facility] OR     HC CORRECT BUNION,SIMPLE  2002     HC REMOVAL GALLBLADDER  1981     IRRIGATION AND DEBRIDEMENT KNEE, COMBINED  11/26/2011    Procedure:COMBINED IRRIGATION AND DEBRIDEMENT KNEE; Surgeon:FELIPE TAYLOR; Location: OR     IRRIGATION AND DEBRIDEMENT KNEE, COMBINED  12/29/2011    Procedure:COMBINED IRRIGATION AND DEBRIDEMENT KNEE; Surgeon:DIVINA BAUTISTA; Location: OR     IRRIGATION AND DEBRIDEMENT KNEE, COMBINED  2/9/2012    Procedure:COMBINED IRRIGATION AND DEBRIDEMENT KNEE; Wound Debridement Right Knee with Placement of Wound Vac; Surgeon:DIVINA BAUTISTA; Location: OR     ZZC NONSPECIFIC PROCEDURE  6/22/12    removal of right TKA             Social History:   Smoking: None  Living situation: Assisted living        Social History     Tobacco Use     Smoking status: Never Smoker     Smokeless tobacco: Never Used   Substance Use Topics     Alcohol use: No            Family History:   Denies family history of bleeding or clotting disorders  Family History   Problem Relation Age of Onset     Heart Disease Mother      Hypertension Mother      Eye Disorder Mother         macular degen     Family History Negative Father             Allergies:     Allergies   Allergen Reactions     Ace Inhibitors Cough     Latex GI Disturbance     lip swelling     Liquid Adhesive Rash     Tape [Adhesive Tape] Rash            Medications:   Anticoagulants: Aspirin  Medications Prior to Admission   Medication Sig Dispense Refill Last Dose     acetaminophen (TYLENOL) 325 MG tablet Take 3 tablets (975 mg) by mouth 3 times daily   8/22/2021 at Unknown time     acetylcysteine (N-ACETYL CYSTEINE) 600 MG CAPS capsule Take 1 capsule (600 mg) by mouth daily   8/22/2021 at Unknown time     albuterol (PROAIR HFA/PROVENTIL HFA/VENTOLIN HFA) 108 (90 Base) MCG/ACT inhaler Inhale 2 puffs into the lungs every 4 hours as needed for shortness of breath / dyspnea or wheezing   prn at prn     aspirin 81 MG tablet Take 1 tablet (81 mg) by mouth daily 30 tablet  8/22/2021 at Unknown time     atenolol (TENORMIN) 100 MG tablet Take 1 tablet (100 mg) by mouth daily 90 tablet 3 8/22/2021 at Unknown time     cholecalciferol (VITAMIN D3) 1000 UNIT tablet Take 1 tablet (1,000 Units) by mouth daily 100 tablet 3 8/22/2021 at Unknown time     diclofenac (VOLTAREN) 1 % topical gel Apply topically daily as needed for moderate pain   prn at prn     doxycycline (VIBRAMYCIN) 100 MG capsule Take 1 capsule by mouth 2 times daily. 180 capsule 1 8/22/2021 at Unknown time     ferrous sulfate (IRON) 325 (65 Fe) MG tablet Take 1 tablet (325 mg) by mouth daily (with breakfast) 30 tablet 2 8/22/2021 at Unknown  time     gabapentin (NEURONTIN) 100 MG capsule Take 2 capsules (200 mg) by mouth every morning   8/22/2021 at Unknown time     gabapentin (NEURONTIN) 800 MG tablet Take 1 tablet (800 mg) by mouth 3 times daily   8/22/2021 at Unknown time     HEMP OIL OR EXTRACT OR OTHER CBD CANNABINOID, NOT MEDICAL CANNABIS, Take 1 tablet by mouth daily CBD gummy   8/22/2021 at Unknown time     HYDROmorphone (DILAUDID) 2 MG tablet TAKE 1 TABLET BY MOUTH EVERY 4 HOURS AS NEEDED FOR 15 DAYS   8/22/2021 at Unknown time     hydroxychloroquine (PLAQUENIL) 200 MG tablet Take 1 tablet by mouth 2 times daily. 60 tablet 1 8/22/2021 at Unknown time     IBANdronate (BONIVA) 150 MG tablet Take 1 tablet (150 mg) by mouth every 30 days Take 60 minutes before am meal with 8 oz. water. Remain upright for 60 minutes. 3 tablet 3 8/22/2021 at Unknown time     ibuprofen (ADVIL/MOTRIN) 200 MG tablet Take 200 mg by mouth 2 times daily   8/22/2021 at Unknown time     levOCARNitine (CARNITOR) 330 MG tablet Take 990 mg by mouth 2 times daily    8/22/2021 at Unknown time     Lidocaine (LIDOCARE) 4 % Patch Place 1 patch onto the skin every 24 hours To prevent lidocaine toxicity, patient should be patch free for 12 hrs daily. 10 patch  8/22/2021 at Unknown time     losartan (COZAAR) 100 MG tablet Take 1 tablet (100 mg) by mouth daily 90 tablet 3 8/22/2021 at Unknown time     medical cannabis (Patient's own supply) See Admin Instructions (The purpose of this order is to document that the patient reports taking medical cannabis.  This is not a prescription, and is not used to certify that the patient has a qualifying medical condition.)   8/22/2021 at Unknown time     Melatonin 1 MG CHEW Take 3 mg by mouth At Bedtime   8/21/2021 at Unknown time     naloxone (NARCAN) 4 MG/0.1ML nasal spray Spray 4 mg into one nostril alternating nostrils once as needed for opioid reversal   prn at prn     potassium chloride ER (KLOR-CON M) 20 MEQ CR tablet Take 20 mEq by mouth    8/22/2021 at Unknown time     predniSONE (DELTASONE) 5 MG tablet Take 5 mg by mouth daily    8/22/2021 at Unknown time     amLODIPine (NORVASC) 5 MG tablet TAKE 1 & 1/2 (ONE & ONE-HALF) TABLETS BY MOUTH ONCE DAILY 135 tablet 0              Review of Systems:   A 10 point review of systems was performed, and was negative except as noted in HPI.         Physical Exam:     Patient Vitals for the past 8 hrs:   BP Temp Temp src Pulse Resp SpO2   08/23/21 1211 (!) 159/66 98.3  F (36.8  C) Oral 64 18 94 %   08/23/21 0728 (!) 168/71 97.7  F (36.5  C) Oral 70 18 95 %       General: awake, alert, cooperative, no apparent distress, appears stated age  HEENT: normal  Respiratory: breathing non-labored  Cardiovascular: skin warm and well perfused  Skin: no rashes or lesions  Abdomen:  non-distended  Lymph:  No abnormal swelling of extremities  Heme:  No petechiae, though diffuse bruising present across all extremities  Neurological: CN II-XII grossly intact  Musculoskeletal:       Spine:   Sensation:      R       L    L3:   Intact   Intact    L4:   Intact   Intact    L5:   Intact   Intact    S1:   Intact   Intact     Motor:     R L    L3: Psoas    5  5    L4:   Quad    5  5    L4: TA   5 5    L5: EHL    5  5    S1: Eversion/PF  5  5        BLE   Tenderness to palpation over the bilateral knee joint lines, states this reproduces the pain that she feels across her knees   Bilateral knee pain with passive range of motion of the bilateral knees             Data:   CT lumbar spine: L3 burst fracture with significant height loss and retropulsion results in severe central stenosis at the L3 level.  Hounsfield units of the adjacent vertebrae less than 40, consistent with severe osteoporosis.            Tanner Sanchez MD  Orthopaedic Spine Surgery  [facility]

## 2021-08-24 NOTE — PROGRESS NOTES
[facility]    HOSPITALIST PROGRESS NOTE :   --------------------------------------------------    Date of Admission:  8/22/2021    Cumulative Summary: Nicolás Wyman is a 81 year old female with numerous medical problems including hypertension, rheumatoid arthritis, long-term steroid use, remote history of non-Hodgkin's lymphoma (diagnosed in 2014), interstitial pulmonary fibrosis, SIADH, compression fracture of L3, lumbar spinal stenosis, osteoporosis who is admitted on 8/22/2021. She has worsening weakness, especially in her legs, had a minor fall yesterday.    Assessment & Plan     Generalized weakness; most likely multifactorial secondary to her rheumatoid arthritis, long-term steroid use compression fractures, lumbar spinal stenosis and osteoporosis which are resulting into decrease exercise tolerance and overall deterioration.    --Patient was seen and examined this morning, daughter was also present at the bedside.  --Discussed with them regarding orthospine recommendations, discussed with them that most of her symptoms are chronic and her progressive leg weakness is multifactorial.  --Her sodium levels are improving as intended slowly with the addition of salt tablet and fluid restriction.  --Discussed with them that patient will probably need higher level of care including long-term cares, patient and her daughter told me that her current assisted living are not able to help with the level of care that she is requiring right now.  --Physical and Occupational Therapy along with care management and  have been consulted for safe disposition planning.  --Physical therapy is recommending TCU    Hyponatremia  SIADH (syndrome of inappropriate ADH production) (H)   Patient's sodium values going back many months (December/2020) range from 116-125 mEq/L  She was seen by nephrology during December/2020 stay at [facility] where elevated urine osmolality suggested  SIADH  Fluid restriction was recommended, daughter says they recently encouraged patient to drink additional fluids, thinking it may help her weakness    --Continue fluid restriction.  --Sodium levels are improving as intended up to 127 this morning which is 2 points up as compared to yesterday.  --At this point I will continue salt tablets twice daily.  --Check BMP tomorrow morning.  --Once her sodium is over 135, probably will decrease salt tablet to once a day with close monitoring and if it is starts to drop again then will go back to twice daily dose.    Urinary retention: Patient has already required straight cath twice.  Proteus urinary tract infection: Urine culture is growing more than 100,000 colonies of Proteus mirabilis, resistant to tetracycline and nitrofurantoin.    --Crocker catheter was placed yesterday, continue Crocker care.  --Patient has also been a started on Flomax.  --Plan for voiding trial tomorrow morning, patient and daughter understand that we are giving voiding trial only in 48 hours and if patient continues to have issues with retention then Crocker catheter might need to be placed back with a voiding trial in 1 week time.  --We will discontinue doxycycline and will order Cefpodoxime 100 mg p.o. twice daily for 7 days.    Lumbar compression fracture, sequela  Osteoporosis   Spinal stenosis of lumbar region    --Lumbar T CT shows a high-grade compression fracture with fragmentation at L3 with progressive loss of height contributing to severe spinal stenosis  --Daughter again asks if there are any treatments available.  We discussed that patient's numerous medical problems, age and debility likely make the risk of any surgical option prohibitive.  -- Ortho consult requested, because of progressive weakness.  This likely represents slow, chronic advancement of pain and debility from her original compression fracture injury  --Was evaluated by orthospine, appreciate Dr. Sanchez help.  According to  "surgery patient has severe osteoporosis and therefore is not a candidate for any instrumented case.  They are not recommending any operative management to address the compression fracture.  Additionally she is not a candidate for kyphoplasty or vertebroplasty due to the posterior element involvement of the high risk of cement extravasation which could cause catastrophic neurological injury.  --Regarding the lower extremity pain, it is possible that it could be managed with a laminectomy L2-L4 however again it is very difficult to differentiate whether or not this is coming from the knee or from the back.  According to Ortho it might be more likely coming from the knees.  --Orthospine is recommending ongoing therapy for deconditioning and immobility.    Essential hypertension, benign:   Patient says she was on amlodipine but this has been discontinued    --Continue beta-blocker  --Continue angiotensin receptor blocker  --Follow blood pressure readings closely, as patient continued to have significant high blood pressure, starting patient on Norvasc 2.5 mg p.o. daily, will monitor blood pressure closely.    History of non-Hodgkin's lymphoma:  Receives care at the [facility]  Last hematology visit at [place] was 7/22/2020 and notes indicate, \"May 2004 had left axillary nodes.  Biopsy showed low-grade B-cell lymphoma, plasmacytic differentiation.  There was widespread joshua involvement, especially the left groin, and skin involvement.  Also biopsied was some skin consistent with her connective tissue disease.  She got Rituxan and bendamustine.  Completed four cycles in September 2014.  Coming up to three and a half years ago.  She was then observed.  In January 2016 she got a lesion on her left thigh.  Biopsy showed low-grade lymphoma.  It was radiated by Dr. Cadena.  Followup PET in 2016 October showed a good response  When I saw her in April of 2019 there was a question of uptake in the left leg. A biopsy was done and " "showed recurrent lymphoma in that area and I asked her radiotherapist to do local radiation to that area which was completed in July of 2019.    --She would now be 1 year out from the last radiation therapy. She has now had a cycle of chemotherapy and 2 separate instances of local radiation in her PET scan now is been doing very well for a year.\"  --Follow-up in spring, 2021 was recommended, patient could not attend that appointment  --Suggest that they make a virtual follow-up visit with [facility]      Rheumatoid arthritis (HCC)  This is longstanding, patient says he was diagnosed more than 40 years ago and she does not have significant pain related to her arthritis  --Continue hydroxychloroquine, ibuprofen, doxycycline  --Continue low-dose prednisone    Diet: Regular Diet Adult  Fluid restriction 1200 ML FLUID    Crocker Catheter: PRESENT, indication: Retention  DVT Prophylaxis: Pneumatic Compression Devices  Code Status: Full Code    The patient's care was discussed with the Bedside Nurse, Patient and Patient's Family.    Disposition Plan   Expected discharge: Tentative discharge tomorrow, once safe disposition planning is available as patient is still needs to be seen by therapies, discussed with patient, family and care coordinator.  More than 35 minutes of the time was spent in patient care, more than 50% of the time was spent in counseling and coordination of the care, her pain medications were adjusted she was a started on a new antihypertensive medications and her antibiotics were changed.    Zahira Boyd MD, [facility]  Text Page (7am - 6pm)    ----------------------------------------------------------------------------------------------------------------------      Interval History    Patient was seen and examined this morning, lying in bed, daughter also present at the bedside.  Discussed with patient regarding her sodium levels which are showing improvement.  Patient main complaint remains her back pain, " they were asking about a scheduled Dilaudid dose in the morning, discussed with them that currently as patient also have as needed Dilaudid every 4 hours available, will use only one a scheduled dose of Dilaudid in the morning but will change her evening dose to earlier as patient goes to bed early.  We also discussed about voiding trial tomorrow morning.  And starting her on a new blood pressure medication.    -Data reviewed today: I reviewed all new labs and imaging results over the last 24 hours.    I personally reviewed no images or EKG's today.    Physical Exam   Temp: 97.2  F (36.2  C) Temp src: Oral BP: (!) 145/59 Pulse: 66   Resp: 16 SpO2: 93 % O2 Device: None (Room air)    Vitals:    08/22/21 1114   Weight: 81.6 kg (180 lb)     Vital Signs with Ranges  Temp:  [97.2  F (36.2  C)-98.6  F (37  C)] 97.2  F (36.2  C)  Pulse:  [65-73] 66  Resp:  [16-18] 16  BP: (124-162)/(57-73) 145/59  SpO2:  [91 %-94 %] 93 %  I/O last 3 completed shifts:  In: 240 [P.O.:240]  Out: 1800 [Urine:1800]    GENERAL: Alert , awake and oriented. NAD. Conversational, appropriate.  Looks chronically sick  HEENT: Normocephalic. EOMI. No icterus or injection. Nares normal.   LUNGS: Clear to auscultation. No dyspnea at rest.   HEART: Regular rate. Extremities perfused.   ABDOMEN: Soft, nontender, and nondistended. Positive bowel sounds.   EXTREMITIES: Bilateral knee pain pain on palpation with passive range of motion  NEUROLOGIC: Moves extremities x4 on command. No acute focal neurologic abnormalities noted.     Medications       acetaminophen  1,000 mg Oral TID     acetylcysteine  600 mg Oral Daily     amLODIPine  2.5 mg Oral Daily     atenolol  100 mg Oral Daily     doxycycline hyclate  100 mg Oral BID     ferrous sulfate  325 mg Oral Daily with breakfast     gabapentin  200 mg Oral QAM     gabapentin  800 mg Oral TID     HYDROmorphone  2 mg Oral BID     hydroxychloroquine  200 mg Oral BID     ibuprofen  200 mg Oral BID     levOCARNitine   990 mg Oral BID     Lidocaine  1 patch Transdermal Q24H     losartan  100 mg Oral Daily     potassium chloride ER  20 mEq Oral Daily     predniSONE  5 mg Oral Daily     sodium chloride  1 g Oral BID w/meals     tamsulosin  0.4 mg Oral Daily       Data   Recent Labs   Lab 08/24/21  0731 08/23/21  1401 08/23/21  0700 08/22/21  1211   WBC  --   --   --  6.7   HGB  --   --   --  12.9   MCV  --   --   --  95   PLT  --   --   --  270   * 125* 125* 122*   POTASSIUM 5.0  --  3.8 4.5   CHLORIDE 92*  --  87* 83*   CO2 31  --  32 32   BUN 7  --  7 8   CR 0.43*  --  0.44* 0.53   ANIONGAP 4  --  6 7   KRISTOPHER 8.6  --  8.4* 8.8   GLC 82  --  78 115*   ALBUMIN  --   --   --  3.2*   PROTTOTAL  --   --   --  5.9*   BILITOTAL  --   --   --  0.6   ALKPHOS  --   --   --  44   ALT  --   --   --  36   AST  --   --   --  18       Imaging:   No results found for this or any previous visit (from the past 24 hour(s)).

## 2021-08-24 NOTE — PROGRESS NOTES
Observation goals PRIOR TO DISCHARGE     Comments: -diagnostic tests and consults completed and resulted: Goal Met.  -safe disposition plan has been identified: Goal not met. Referrals have been sent out for TCU placement.  Nurse to notify provider when observation goals have been met and patient is ready for discharge. Goals not met.  C/O pain on lower back/knees managed with tylenol, lidocaine, and dilaudid. Up with a lift and sat up on chair for few hours, tolerated fairly well. Had fair po intake. Crocker with clear jodie urine with adequate output.

## 2021-08-24 NOTE — PROGRESS NOTES
08/24/21 1535   Quick Adds   Type of Visit Initial PT Evaluation   Living Environment   People in home facility resident   Current Living Arrangements assisted living   Transportation Anticipated family or friend will provide   Living Environment Comments Currently live in intermediate; per pt notes, family considering LTC    Self-Care   Usual Activity Tolerance moderate   Current Activity Tolerance fair   Equipment Currently Used at Home walker, standard;wheelchair, manual;shower chair   Activity/Exercise/Self-Care Comment intermediate provides assistance with all ADLs, meals, dressing, medication management   Disability/Function   Wear Glasses or Blind yes   Vision Management glasses   Fall history within last six months yes   Number of times patient has fallen within last six months 5   Change in Functional Status Since Onset of Current Illness/Injury yes   General Information   Onset of Illness/Injury or Date of Surgery 08/22/21   Referring Physician Cady Rivas MD   Pertinent History of Current Problem (include personal factors and/or comorbidities that impact the POC) 81 year old female with history of non hodgkin's lymphoma, COPD, hypertension, RA, who presents for evaluation after a fall, with low back pain and knee pain.  Previous known L3 compression fracture.    Existing Precautions/Restrictions fall;spinal   General Observations Pt very fearful of moving due to hx of falls. Nervous that she will fall again and prefers to lay in bed. Unaware of bruising in her legs.    Cognition   Orientation Status (Cognition) oriented x 4   Affect/Mental Status (Cognition) anxious   Follows Commands (Cognition) WNL;repetition of directions required   Cognitive Status Comments Fear of falling and increase in pain   Pain Assessment   Patient Currently in Pain Yes, see Vital Sign flowsheet   Integumentary/Edema   Integumentary/Edema Comments B LE and UEs bruising    Posture    Posture Forward head position;Kyphosis;Lordosis    Range of Motion (ROM)   ROM Comment Not formally assessed; demonstrates functional range in UEs and LEs as obs via mobility   Strength   Strength Comments not formally assesed; B LE demonstrated against gravity strength    Bed Mobility   Comment (Bed Mobility) sup>sit Mod A with HOB elevated   Transfers   Transfers sit-stand transfer   Impairments Contributing to Impaired Transfers pain;other (see comments);impaired postural control   Transfer Safety Comments Attempts of sit>stand pt unable to clear bottom from bed with Mod A x 2; fear playing a role as well   Balance   Balance Comments CGA sitting balance at EOB. Demonstrated adequate weight shift   Clinical Impression   Criteria for Skilled Therapeutic Intervention yes, treatment indicated   PT Diagnosis (PT) Impaired transfers   Influenced by the following impairments Weakness, fear of falling, impaired balance, decreased activity tolerance   Functional limitations due to impairments decreased independence and safety with mobility   Clinical Presentation Stable/Uncomplicated   Clinical Presentation Rationale Fear and anxiety with falling,    Clinical Decision Making (Complexity) low complexity   Therapy Frequency (PT) 5x/week   Predicted Duration of Therapy Intervention (days/wks) 1 week   Planned Therapy Interventions (PT) balance training;bed mobility training;strengthening;transfer training;gait training;home exercise program;neuromuscular re-education;ROM (range of motion);stretching;home program guidelines;progressive activity/exercise   Risk & Benefits of therapy have been explained patient;evaluation/treatment results reviewed;care plan/treatment goals reviewed;risks/benefits reviewed;participants voiced agreement with care plan;participants included   Clinical Impression Comments Pt fearful and anxious secondary to frequent falls.    PT Discharge Planning    PT Discharge Recommendation (DC Rec) Transitional Care Facility   PT Rationale for DC Rec Pt is  mod A for all mobility. She is well below baseline and will require ongoing skilled therapy to improve strength and mobility for ease of transfers.    PT Brief overview of current status  Pt fearful and anxious secondary to previous falls. Mod A bed mobility: Mod A sit to stand with FWW; unable to clear bottom for full stand. Requires universal sling to transfer bed to chair. Recommend nursing staff use a sling for transfers   Therapy Certification   Start of care date 08/24/21   Certification date from 08/24/21   Certification date to 08/30/21   Medical Diagnosis Low back and leg pain post fall   Total Evaluation Time   Total Evaluation Time (Minutes) 10

## 2021-08-24 NOTE — PROGRESS NOTES
Care Management Follow Up    Length of Stay (days): 1    Expected Discharge Date: 08/24/2021     Concerns to be Addressed:       Patient plan of care discussed at interdisciplinary rounds: Yes    Anticipated Discharge Disposition: Long Term Care     Anticipated Discharge Services:    Anticipated Discharge DME:      Patient/family educated on Medicare website which has current facility and service quality ratings: no (family already has choices)  Education Provided on the Discharge Plan:    Patient/Family in Agreement with the Plan:      Referrals Placed by CM/SW:    Private pay costs discussed: Not applicable    Additional Information:  Writer spoke to patient's daughter Lynne. She wanted to discuss TCU choices. She provided writer with choices for [facility], [facility], [facility] and [facility]. Referrals sent via [facility]. Writer discussed transportation and daughter is agreeable to a medical ride. She is aware of any possible out of pocket cost.       LINDA Juarez

## 2021-08-24 NOTE — PROGRESS NOTES
Observation goals PRIOR TO DISCHARGE     Comments: -diagnostic tests and consults completed and resulted: met  -safe disposition plan has been identified: not met  Nurse to notify provider when observation goals have been met and patient is ready for discharge.        Patient has been reporting moderate to severe pain throughout the shift. Patient has remained in bed due to increased pain with movement. Turn and repo every 2 hours. Referrals out for TCU placement. Adequate output and sodium is improving. Patient will be seen by PT.

## 2021-08-24 NOTE — PLAN OF CARE
[facility]      OUTPATIENT PHYSICAL THERAPY EVALUATION  PLAN OF TREATMENT FOR OUTPATIENT REHABILITATION  (COMPLETE FOR INITIAL CLAIMS ONLY)  Patient's Last Name, First Name, M.I.  YOB: 1940  Nicolás Wyman                        Provider's Name  [facility] Medical Record No.  [id number]                               Onset Date:  08/22/21   Start of Care Date:  08/24/21      Type:     _X_PT   ___OT   ___SLP Medical Diagnosis:  Low back and leg pain post fall                        PT Diagnosis:  Impaired transfers   Visits from SOC:  1   _________________________________________________________________________________  Plan of Treatment/Functional Goals    Planned Interventions: balance training, bed mobility training, strengthening, transfer training, gait training, home exercise program, neuromuscular re-education, ROM (range of motion), stretching, home program guidelines, progressive activity/exercise     Goals: See Physical Therapy Goals on Care Plan in [employer] electronic health record.    Therapy Frequency: 5x/week  Predicted Duration of Therapy Intervention: 1 week  _________________________________________________________________________________    I CERTIFY THE NEED FOR THESE SERVICES FURNISHED UNDER        THIS PLAN OF TREATMENT AND WHILE UNDER MY CARE     (Physician co-signature of this document indicates review and certification of the therapy plan).              Certification date from: 08/24/21, Certification date to: 08/30/21    Referring Physician: Cady Rivas MD            Initial Assessment        See Physical Therapy evaluation dated 08/24/21 in Epic electronic health record.

## 2021-08-24 NOTE — PLAN OF CARE
OT: Orders received. Chart reviewed and discussed with care team.  OT not indicated due to receiving assist w/ all ADLs at baseline at [facility]. Family considering LTC. Defer discharge recommendations to PT.  Will complete IP OT orders.

## 2021-08-24 NOTE — TELEPHONE ENCOUNTER
Fax received from JUAN RAMON - Revision to Plan of Care - 06/05/21 for review and signature.  Put in Dr. Hernandez's in basket.

## 2021-08-24 NOTE — CONSULTS
Care Management Initial Consult    General Information  Assessment completed with: Patient, Children,  (patient and daughter Lynne)  Type of CM/SW Visit: Initial Assessment    Primary Care Provider verified and updated as needed: Yes   Readmission within the last 30 days:           Advance Care Planning:            Communication Assessment  Patient's communication style: spoken language (English or Bilingual)             Cognitive  Cognitive/Neuro/Behavioral: WDL                      Living Environment:   People in home: child(onelia), adult     Current living Arrangements: house      Able to return to prior arrangements: no  Living Arrangement Comments:  (family is planning for her to go to LTC)    Family/Social Support:  Care provided by: child(onelia)  Provides care for: no one, unable/limited ability to care for self  Marital Status: Single  Children          Description of Support System: Supportive, Involved    Support Assessment: Adequate family and caregiver support    Current Resources:   Patient receiving home care services:       Community Resources:    Equipment currently used at home:    Supplies currently used at home:      Employment/Financial:  Employment Status:          Financial Concerns:             Lifestyle & Psychosocial Needs:  Social Determinants of Health     Tobacco Use: Low Risk      Smoking Tobacco Use: Never Smoker     Smokeless Tobacco Use: Never Used   Alcohol Use:      Frequency of Alcohol Consumption:      Average Number of Drinks:      Frequency of Binge Drinking:    Financial Resource Strain:      Difficulty of Paying Living Expenses:    Food Insecurity:      Worried About Running Out of Food in the Last Year:      Ran Out of Food in the Last Year:    Transportation Needs:      Lack of Transportation (Medical):      Lack of Transportation (Non-Medical):    Physical Activity:      Days of Exercise per Week:      Minutes of Exercise per Session:    Stress:      Feeling of Stress :    Social  Connections:      Frequency of Communication with Friends and Family:      Frequency of Social Gatherings with Friends and Family:      Attends Caodaism Services:      Active Member of Clubs or Organizations:      Attends Club or Organization Meetings:      Marital Status:    Intimate Partner Violence:      Fear of Current or Ex-Partner:      Emotionally Abused:      Physically Abused:      Sexually Abused:    Depression: Not at risk     PHQ-2 Score: 0   Housing Stability:      Unable to Pay for Housing in the Last Year:      Number of Places Lived in the Last Year:      Unstable Housing in the Last Year:        Functional Status:  Prior to admission patient needed assistance:              Mental Health Status:          Chemical Dependency Status:                Values/Beliefs:  Spiritual, Cultural Beliefs, Caodaism Practices, Values that affect care:                 Additional Information:  Writer met with patient and her daughter Lynne this morning.  Introduced self and role in discharge planning.  Dtr Lynne stated patient will be going to LTC when she leaves here and they would like [facility] in [place].  Writer informed that we may need for choices as many places are full currently.    Writer did place referral by [facility].      Loraine Pope RN

## 2021-08-25 NOTE — PLAN OF CARE
Patient vital signs are at baseline: Yes  Patient able to ambulate as they were prior to admission or with assist devices provided by therapies during their stay:  Yes  Patient MUST void prior to discharge:  Yes  Patient able to tolerate oral intake:  Yes  Pain has adequate pain control using Oral analgesics: No, c/o pain 8/10 when awake.

## 2021-08-25 NOTE — PLAN OF CARE
Crocker removed at 1045, due to void. Up in chair x1 hour this am.  Pt transferred to Obs unit at 1500.

## 2021-08-25 NOTE — PLAN OF CARE
PRIMARY DIAGNOSIS: ACUTE PAIN  OUTPATIENT/OBSERVATION GOALS TO BE MET BEFORE DISCHARGE:  1. Pain Status: Improved-controlled with oral pain medications.    2. Return to near baseline physical activity: No    3. Cleared for discharge by consultants (if involved): No    Discharge Planner Nurse   Safe discharge environment identified: No  Barriers to discharge: Yes       Entered by: Indigo Fam 08/25/2021 4:43 PM     Please review provider order for any additional goals.   Nurse to notify provider when observation goals have been met and patient is ready for discharge.

## 2021-08-25 NOTE — PLAN OF CARE
Service: medical  Behavior/Aggression tool color: green  Diagnosis: generalized weakness, lumbar compression fracture , osteoporosis, lumbar spinal stenosis  Mental Status: Aox4  Activity/dangle: lift, turn/repo  Diet: Regular, 1.500 mL fluid restriction  Pain: pain in low back and knees managed with PRN and scheduled dilaudid, scheduled tylenol, lidocaine patch(removed), and ice.  Niño/Voiding: niño for retention. Possible voiding trial 8/25  Tele/Restraints/Iso: None   02/LDA: PIV SL  D/C Date: Pending TCU placement-SW following.   Other Info: non-surgical, observation status. Scattered bruising.                        Observation goals PRIOR TO DISCHARGE    Comments:   -diagnostic tests and consults completed and resulted: met   -safe disposition plan has been identified: not met, TCU needed, SW working on referrals still   Nurse to notify provider when observation goals have been met and patient is ready for discharge.

## 2021-08-25 NOTE — PROGRESS NOTES
Observation goals PRIOR TO DISCHARGE    Comments: -diagnostic tests and consults completed and resulted: met   -safe disposition plan has been identified: not met, TCU needed, SW working on referrals still   Nurse to notify provider when observation goals have been met and patient is ready for discharge.

## 2021-08-25 NOTE — PROGRESS NOTES
[facility]    HOSPITALIST PROGRESS NOTE :   --------------------------------------------------    Date of Admission:  8/22/2021    Cumulative Summary: Nicolás Wyman is a 81 year old female with numerous medical problems including hypertension, rheumatoid arthritis, long-term steroid use, remote history of non-Hodgkin's lymphoma (diagnosed in 2014), interstitial pulmonary fibrosis, SIADH, compression fracture of L3, lumbar spinal stenosis, osteoporosis who is admitted on 8/22/2021. She has worsening weakness, especially in her legs, had a minor fall yesterday.    Assessment & Plan     Generalized weakness; most likely multifactorial secondary to her rheumatoid arthritis, long-term steroid use compression fractures, lumbar spinal stenosis and osteoporosis which are resulting into decrease exercise tolerance and overall deterioration.      --Patient was seen and examined this morning, daughter was also present at the bedside.  --Discussed with them regarding orthospine recommendations, discussed with them that most of her symptoms are chronic and her progressive leg weakness is multifactorial.  -- voiding trial this morning   -- tentative discharge later today if bed is available     -- still constipated , will order dulcolax suppository and enema   --Her sodium levels are improving as intended slowly with the addition of salt tablet and fluid restriction.  --Discussed with them that patient will probably need higher level of care including long-term cares, patient and her daughter told me that her current assisted living are not able to help with the level of care that she is requiring right now.  --Physical and Occupational Therapy along with care management and  have been consulted for safe disposition planning.  --Physical therapy is recommending TCU    Hyponatremia  SIADH (syndrome of inappropriate ADH production) (H)   Patient's sodium values going back many months  (December/2020) range from 116-125 mEq/L  sodium is improved to 128   She was seen by nephrology during December/2020 stay at [facility] where elevated urine osmolality suggested SIADH  Fluid restriction was recommended, daughter says they recently encouraged patient to drink additional fluids, thinking it may help her weakness    --Continue fluid restriction.  --Sodium levels are improving as intended up to 127 this morning which is 2 points up as compared to yesterday.  --At this point I will continue salt tablets twice daily.  --Check BMP tomorrow morning.  --Once her sodium is over 135, probably will decrease salt tablet to once a day with close monitoring and if it is starts to drop again then will go back to twice daily dose.    Urinary retention: Patient has already required straight cath twice.  Proteus urinary tract infection: Urine culture is growing more than 100,000 colonies of Proteus mirabilis, resistant to tetracycline and nitrofurantoin.    -- Remove Crocker catheter today  --  Patient has also been a started on Flomax.  -- patient and daughter understand that we are giving voiding trial only in 48 hours and if patient continues to have issues with retention then Crocker catheter might need to be placed back with a voiding trial in 1 week time.  -- Discontinued doxycycline and will order Cefpodoxime 100 mg p.o. twice daily for 7 days.    Lumbar compression fracture, sequela  Osteoporosis   Spinal stenosis of lumbar region    --Lumbar T CT shows a high-grade compression fracture with fragmentation at L3 with progressive loss of height contributing to severe spinal stenosis  --Daughter again asks if there are any treatments available.  We discussed that patient's numerous medical problems, age and debility likely make the risk of any surgical option prohibitive.  -- Ortho consult requested, because of progressive weakness.  This likely represents slow, chronic advancement of pain and debility from her  "original compression fracture injury  --Was evaluated by orthospine, appreciate Dr. Sanchez help.  According to surgery patient has severe osteoporosis and therefore is not a candidate for any instrumented case.  They are not recommending any operative management to address the compression fracture.  Additionally she is not a candidate for kyphoplasty or vertebroplasty due to the posterior element involvement of the high risk of cement extravasation which could cause catastrophic neurological injury.  --Regarding the lower extremity pain, it is possible that it could be managed with a laminectomy L2-L4 however again it is very difficult to differentiate whether or not this is coming from the knee or from the back.  According to Ortho it might be more likely coming from the knees.  --Orthospine is recommending ongoing therapy for deconditioning and immobility.    Essential hypertension, benign:   Patient says she was on amlodipine but this has been discontinued    --Continue beta-blocker  --Continue angiotensin receptor blocker  --Follow blood pressure readings closely, as patient continued to have significant high blood pressure, starting patient on Norvasc 2.5 mg p.o. daily, blood pressure has improved now    History of non-Hodgkin's lymphoma:  Receives care at the [facility]  Last hematology visit at [place] was 7/22/2020 and notes indicate, \"May 2004 had left axillary nodes.  Biopsy showed low-grade B-cell lymphoma, plasmacytic differentiation.  There was widespread joshua involvement, especially the left groin, and skin involvement.  Also biopsied was some skin consistent with her connective tissue disease.  She got Rituxan and bendamustine.  Completed four cycles in September 2014.  Coming up to three and a half years ago.  She was then observed.  In January 2016 she got a lesion on her left thigh.  Biopsy showed low-grade lymphoma.  It was radiated by Dr. Cadena.  Followup PET in 2016 October showed a good " "response  When I saw her in April of 2019 there was a question of uptake in the left leg. A biopsy was done and showed recurrent lymphoma in that area and I asked her radiotherapist to do local radiation to that area which was completed in July of 2019.    --She would now be 1 year out from the last radiation therapy. She has now had a cycle of chemotherapy and 2 separate instances of local radiation in her PET scan now is been doing very well for a year.\"  --Follow-up in spring, 2021 was recommended, patient could not attend that appointment  --Suggest that they make a virtual follow-up visit with [place] [facility]    Rheumatoid arthritis (HCC)  This is longstanding, patient says he was diagnosed more than 40 years ago and she does not have significant pain related to her arthritis  --Continue hydroxychloroquine, ibuprofen, doxycycline  --Continue low-dose prednisone    Diet: Regular Diet Adult  Fluid restriction 1500 ML FLUID  Advance Diet as Tolerated    Niño Catheter: PRESENT, indication: Retention  DVT Prophylaxis: Pneumatic Compression Devices  Code Status: Full Code    The patient's care was discussed with the Bedside Nurse, Patient and Patient's Family.    Disposition Plan   Expected discharge: Tentative discharge later today , if safe disposition plan is available , discussed with patient, family and care coordinator.  About 25 minutes of the time was spent in patient care, more than 50% of the time was spent in counseling and coordination of the care, her pain medications were adjusted she was a started on a new antihypertensive medications and her antibiotics were changed.    Zahira Boyd MD, FACP  Text Page (7am - 6pm)    ----------------------------------------------------------------------------------------------------------------------  Interval History    Patient was seen and examined , lying in bed, daughter present , we discussed about removing niño , sodium levels and her pain med's     -Data " reviewed today: I reviewed all new labs and imaging results over the last 24 hours.    I personally reviewed no images or EKG's today.    Physical Exam   Temp: 98.5  F (36.9  C) Temp src: Axillary BP: (!) 147/72 Pulse: 86   Resp: 20 SpO2: 96 % O2 Device: None (Room air) Oxygen Delivery: 1 LPM  Vitals:    08/22/21 1114   Weight: 81.6 kg (180 lb)     Vital Signs with Ranges  Temp:  [97.2  F (36.2  C)-98.5  F (36.9  C)] 98.5  F (36.9  C)  Pulse:  [62-86] 86  Resp:  [16-20] 20  BP: (112-147)/(48-72) 147/72  SpO2:  [89 %-96 %] 96 %  I/O last 3 completed shifts:  In: 320 [P.O.:320]  Out: 1625 [Urine:1625]    GENERAL: Alert , awake and oriented. NAD. Conversational, appropriate.  Looks chronically sick  HEENT: Normocephalic. EOMI. No icterus or injection. Nares normal.   LUNGS: Clear to auscultation. No dyspnea at rest.   HEART: Regular rate. Extremities perfused.   ABDOMEN: Soft, nontender, and nondistended. Positive bowel sounds.   EXTREMITIES: Bilateral knee pain pain on palpation with passive range of motion  NEUROLOGIC: Moves extremities x4 on command. No acute focal neurologic abnormalities noted.     Medications       acetaminophen  1,000 mg Oral TID     acetylcysteine  600 mg Oral Daily     amLODIPine  2.5 mg Oral Daily     atenolol  100 mg Oral Daily     cefdinir  300 mg Oral Q12H BERNARD     ferrous sulfate  325 mg Oral Daily with breakfast     gabapentin  200 mg Oral QAM     gabapentin  800 mg Oral TID     HYDROmorphone  2 mg Oral Daily     HYDROmorphone  2 mg Oral Daily     hydroxychloroquine  200 mg Oral BID     ibuprofen  200 mg Oral BID     levOCARNitine  990 mg Oral BID     Lidocaine  1 patch Transdermal Q24H     losartan  100 mg Oral Daily     potassium chloride ER  20 mEq Oral Daily     predniSONE  5 mg Oral Daily     sodium chloride  1 g Oral BID w/meals     tamsulosin  0.4 mg Oral Daily       Data   Recent Labs   Lab 08/25/21  0709 08/24/21  0731 08/23/21  1401 08/23/21  0700 08/22/21  1211   WBC  --   --    --   --  6.7   HGB  --   --   --   --  12.9   MCV  --   --   --   --  95   PLT  --   --   --   --  270   * 127* 125* 125* 122*   POTASSIUM 4.1 5.0  --  3.8 4.5   CHLORIDE 94 92*  --  87* 83*   CO2 36* 31  --  32 32   BUN 9 7  --  7 8   CR 0.42* 0.43*  --  0.44* 0.53   ANIONGAP <1* 4  --  6 7   KRISTOPHER 8.4* 8.6  --  8.4* 8.8   GLC 86 82  --  78 115*   ALBUMIN  --   --   --   --  3.2*   PROTTOTAL  --   --   --   --  5.9*   BILITOTAL  --   --   --   --  0.6   ALKPHOS  --   --   --   --  44   ALT  --   --   --   --  36   AST  --   --   --   --  18       Imaging:   No results found for this or any previous visit (from the past 24 hour(s)).

## 2021-08-26 NOTE — PLAN OF CARE
Observation goals PRIOR TO DISCHARGE     Comments: -diagnostic tests and consults completed and resulted  Partially Met  -safe disposition plan has been identified   Not Met  Nurse to notify provider when observation goals have been met and patient is ready for discharge.  Yes

## 2021-08-26 NOTE — PLAN OF CARE
Covid negative.  A&O.  Forgetful at times.  AVSS except elevated SBP.  RA. Fall risk.  Bed alarm on.  Regular diet.  FR 1500 ml.  C/o bilateral lower back and knee pain. Dilaudid prn/schedule medications given.  Lidocaine patch on. Saline locked.  Bladder scan for 309, 396.  Crocker placed.  Urine return of 400 ml clear jodie

## 2021-08-26 NOTE — PLAN OF CARE
Observation Goals     -diagnostic tests and consults completed and resulted Pending  -safe disposition plan has been identified Not Met  Nurse to notify provider when observation goals have been met and patient is ready for discharge.    Pt is A&Ox4, VSS, and tolerating a regular diet. Pt c/o back and knee pain managed with PRN/scheduled PO dilauded. Crocker draining well, lidocaine patch removed this AM. Magnesium citrate ordered for constipation. Nurse will continue to monitor.

## 2021-08-26 NOTE — PROGRESS NOTES
Care Management Follow Up    Length of Stay (days): 1    Expected Discharge Date: 08/26/2021     Concerns to be Addressed:     discharge needs  Patient plan of care discussed at interdisciplinary rounds: Yes    Anticipated Discharge Disposition: TCU and then transitioning into LTC     Anticipated Discharge Services:    Anticipated Discharge DME:      Patient/family educated on Medicare website which has current facility and service quality ratings: no (family already has choices)  Education Provided on the Discharge Plan:    Patient/Family in Agreement with the Plan:      Referrals Placed by CM/SW:  TCU  Private pay costs discussed: Not applicable    Additional Information:  JOSS followed up with [facility] admissions who indicate they are still reviewing referral and will get back to this writer.    JOSS met with patient, son Roe and dtr Lynne (via speaker phone). Lynne indicated she spoke to someone at [facility] who told her there was a TCU bed available. Referral from 2 days ago resent via DOD. JOSS spoke to [facility] adm's who indicated they recently have had a few LTC beds become available and will review referral and get back to this writer.    Per family request, JOSS left another  for adm's at [facility].    ADDENDUM:  1616: JOSS received call back from [facility], indicating they have accepted pt into LTC bed with therapy services. Awaiting return call to find out when they can accept.    ADDENDUM:  1627: [facility] accepted for adm after 1300 tomorrow. JOSS scheduled ride requesting 1230 pick although next avail ride will be 1430. JOSS solis MD, updated pt, family, medical team and [facility].    JOSS to continue to follow and assist with discharge planning.    LINDA Joshua  Daytime (8:00am-4:30pm): [phone]  After-Hours JOSS Pager (4:30pm-11:30pm): [phone]           LINDA De Jesus

## 2021-08-26 NOTE — PROGRESS NOTES
[facility]    HOSPITALIST PROGRESS NOTE :   --------------------------------------------------    Date of Admission:  8/22/2021    Cumulative Summary: Nicolás Wyman is a 81 year old female with numerous medical problems including hypertension, rheumatoid arthritis, long-term steroid use, remote history of non-Hodgkin's lymphoma (diagnosed in 2014), interstitial pulmonary fibrosis, SIADH, compression fracture of L3, lumbar spinal stenosis, osteoporosis who is admitted on 8/22/2021. She has worsening weakness, especially in her legs, had a minor fall yesterday.    Assessment & Plan     Generalized weakness; most likely multifactorial secondary to her rheumatoid arthritis, long-term steroid use compression fractures, lumbar spinal stenosis and osteoporosis which are resulting into decrease exercise tolerance and overall deterioration.    --Patient was seen and examined this morning, complaining of back pain but otherwise feeling well.  --Still complaining of constipation, she has only taken Dulcolax suppository yesterday, I encouraged him to take the enema, also add mag citrate to her medications today.  --Continue other bowel regimen, discussed the plan of care with bedside nursing also.  --Encourage patient to increase her mobilization and work with therapies, encourage her to use the bedside commode, discussed with them that most of her symptoms are chronic and her progressive leg weakness is multifactorial.  --Voiding trial was attempted yesterday but unfortunately patient was retaining significant amount of urine in the bladder for more than 300 mL x 2, Crocker catheter has been placed again.  -- I did have discussion with patient and his daughter regarding possible need for placing the Crocker catheter back while patient has been a started on Flomax and then doing voiding trial in in a week to see if Crocker can be removed.  --Her sodium levels are improving as intended slowly with the  addition of salt tablet and fluid restriction.  --Discussed with them that patient will probably need higher level of care including long-term cares, patient and her daughter told me that her current assisted living are not able to help with the level of care that she is requiring right now.  --Physical and Occupational Therapy along with care management and  have been consulted for safe disposition planning.  --Physical therapy is recommending TCU,  are working on disposition planning.    Hyponatremia  SIADH (syndrome of inappropriate ADH production) (H)   Patient's sodium values going back many months (December/2020) range from 116-125 mEq/L  sodium is improved to 128   She was seen by nephrology during December/2020 stay at [facility] where elevated urine osmolality suggested SIADH  Fluid restriction was recommended, daughter says they recently encouraged patient to drink additional fluids, thinking it may help her weakness    --Continue fluid restriction.  -- sodium levels are improved to 128, remain at the same level this morning  --At this point I will continue salt tablets twice daily.  -- will check levels again tomorrow motrning   --Once her sodium is over 135, probably will decrease salt tablet to once a day with close monitoring and if it is starts to drop again then will go back to twice daily dose.    Urinary retention: Patient has already required straight cath twice.  Proteus urinary tract infection: Urine culture is growing more than 100,000 colonies of Proteus mirabilis, resistant to tetracycline and nitrofurantoin.    -- Remove Crocker catheter today  --  Patient has also been a started on Flomax.  -- patient and daughter understand that we are giving voiding trial only in 48 hours and if patient continues to have issues with retention then Crocker catheter might need to be placed back with a voiding trial in 1 week time.  -- Discontinued doxycycline and will order  Cefpodoxime 100 mg p.o. twice daily for 7 days.    Lumbar compression fracture, sequela  Osteoporosis   Spinal stenosis of lumbar region    --Lumbar T CT shows a high-grade compression fracture with fragmentation at L3 with progressive loss of height contributing to severe spinal stenosis  --Daughter again asks if there are any treatments available.  We discussed that patient's numerous medical problems, age and debility likely make the risk of any surgical option prohibitive.  -- Ortho consult requested, because of progressive weakness.  This likely represents slow, chronic advancement of pain and debility from her original compression fracture injury  --Was evaluated by orthospine, appreciate Dr. Sanchez help.  According to surgery patient has severe osteoporosis and therefore is not a candidate for any instrumented case.  They are not recommending any operative management to address the compression fracture.  Additionally she is not a candidate for kyphoplasty or vertebroplasty due to the posterior element involvement of the high risk of cement extravasation which could cause catastrophic neurological injury.  --Regarding the lower extremity pain, it is possible that it could be managed with a laminectomy L2-L4 however again it is very difficult to differentiate whether or not this is coming from the knee or from the back.  According to Ortho it might be more likely coming from the knees.  --Orthospine is recommending ongoing therapy for deconditioning and immobility.    Essential hypertension, benign:   Patient says she was on amlodipine but this has been discontinued    --Continue beta-blocker  --Continue angiotensin receptor blocker  --Follow blood pressure readings closely, as patient continued to have significant high blood pressure, starting patient on Norvasc 2.5 mg p.o. daily, blood pressure has improved now    History of non-Hodgkin's lymphoma:  Receives care at the [facility]  Last hematology visit at  "[facility] was 7/22/2020 and notes indicate, \"May 2004 had left axillary nodes.  Biopsy showed low-grade B-cell lymphoma, plasmacytic differentiation.  There was widespread joshua involvement, especially the left groin, and skin involvement.  Also biopsied was some skin consistent with her connective tissue disease.  She got Rituxan and bendamustine.  Completed four cycles in September 2014.  Coming up to three and a half years ago.  She was then observed.  In January 2016 she got a lesion on her left thigh.  Biopsy showed low-grade lymphoma.  It was radiated by Dr. Cadena.  Followup PET in 2016 October showed a good response  When I saw her in April of 2019 there was a question of uptake in the left leg. A biopsy was done and showed recurrent lymphoma in that area and I asked her radiotherapist to do local radiation to that area which was completed in July of 2019.    --She would now be 1 year out from the last radiation therapy. She has now had a cycle of chemotherapy and 2 separate instances of local radiation in her PET scan now is been doing very well for a year.\"  --Follow-up in spring, 2021 was recommended, patient could not attend that appointment  --Suggest that they make a virtual follow-up visit with [facility] hematology    Rheumatoid arthritis (HCC)  This is longstanding, patient says he was diagnosed more than 40 years ago and she does not have significant pain related to her arthritis  --Continue hydroxychloroquine, ibuprofen, doxycycline  --Continue low-dose prednisone    Diet: Regular Diet Adult  Fluid restriction 1500 ML FLUID  Advance Diet as Tolerated    Crocker Catheter: PRESENT, indication: Retention, Retention  DVT Prophylaxis: Pneumatic Compression Devices  Code Status: Full Code    The patient's care was discussed with the Bedside Nurse, Patient and Patient's Family.    Disposition Plan   Expected discharge:  and care coordinators are working closely on safe disposition planning, tentative " discharge later today or tomorrow when safe disposition plan is available.    Zahira Boyd MD, FACP  Text Page (7am - 6pm)    ----------------------------------------------------------------------------------------------------------------------  Interval History    Was seen and examined, lying in bed, complaining of back pain and constipation, encourage her to increase her mobilization with assist and encourage her to use the bedside commode.    -Data reviewed today: I reviewed all new labs and imaging results over the last 24 hours.    I personally reviewed no images or EKG's today.    Physical Exam   Temp: 98.1  F (36.7  C) Temp src: Axillary BP: 139/44 Pulse: 83   Resp: 18 SpO2: 94 % O2 Device: None (Room air)    Vitals:    08/22/21 1114   Weight: 81.6 kg (180 lb)     Vital Signs with Ranges  Temp:  [97.5  F (36.4  C)-98.1  F (36.7  C)] 98.1  F (36.7  C)  Pulse:  [63-83] 83  Resp:  [18] 18  BP: (127-193)/(43-73) 139/44  SpO2:  [92 %-96 %] 94 %  I/O last 3 completed shifts:  In: -   Out: 500 [Urine:500]    GENERAL: Alert , awake and oriented. NAD. Conversational, appropriate.  Looks chronically sick  HEENT: Normocephalic. EOMI. No icterus or injection. Nares normal.   LUNGS: Clear to auscultation. No dyspnea at rest.   HEART: Regular rate. Extremities perfused.   ABDOMEN: Soft, nontender, and nondistended. Positive bowel sounds.   EXTREMITIES: Bilateral knee pain pain on palpation with passive range of motion  NEUROLOGIC: Moves extremities x4 on command. No acute focal neurologic abnormalities noted.     Medications       acetaminophen  1,000 mg Oral TID     acetylcysteine  600 mg Oral Daily     amLODIPine  2.5 mg Oral Daily     atenolol  100 mg Oral Daily     cefdinir  300 mg Oral Q12H BERNARD     ferrous sulfate  325 mg Oral Daily with breakfast     gabapentin  200 mg Oral QAM     gabapentin  800 mg Oral TID     HYDROmorphone  2 mg Oral Daily     HYDROmorphone  2 mg Oral Daily     hydroxychloroquine  200 mg Oral BID      ibuprofen  200 mg Oral BID     levOCARNitine  990 mg Oral BID     Lidocaine  1 patch Transdermal Q24H     losartan  100 mg Oral Daily     polyethylene glycol  17 g Oral BID     potassium chloride ER  20 mEq Oral Daily     predniSONE  5 mg Oral Daily     sodium chloride  1 g Oral BID w/meals     tamsulosin  0.4 mg Oral Daily       Data   Recent Labs   Lab 08/26/21  0602 08/25/21  0709 08/24/21  0731 08/23/21  0700 08/22/21  1211   WBC  --   --   --   --  6.7   HGB  --   --   --   --  12.9   MCV  --   --   --   --  95   PLT  --   --   --   --  270   * 128* 127* 125* 122*   POTASSIUM  --  4.1 5.0 3.8 4.5   CHLORIDE  --  94 92* 87* 83*   CO2  --  36* 31 32 32   BUN  --  9 7 7 8   CR  --  0.42* 0.43* 0.44* 0.53   ANIONGAP  --  <1* 4 6 7   KRISTOPHER  --  8.4* 8.6 8.4* 8.8   GLC  --  86 82 78 115*   ALBUMIN  --   --   --   --  3.2*   PROTTOTAL  --   --   --   --  5.9*   BILITOTAL  --   --   --   --  0.6   ALKPHOS  --   --   --   --  44   ALT  --   --   --   --  36   AST  --   --   --   --  18       Imaging:   No results found for this or any previous visit (from the past 24 hour(s)).

## 2021-08-26 NOTE — PROGRESS NOTES
[id number]: A&Ox4. VSS on RA. Up with lift. Turn and repo. Regular diet well tolerated. 1500mL fluid restriction. Sodium improving. IV SL. Pain managed with prn and scheduled dilaudid. Declined suppository. Bladder scan 168mL, pt encouraged to increase fluid intake and will reassess bladder scan in 4hrs. Order in place to place niño again if bladder scan is greater than 350mL. Discharge pending TCU placement.

## 2021-08-26 NOTE — PLAN OF CARE
Observation Goals    -diagnostic tests and consults completed and resulted Pending  -safe disposition plan has been identified Not Met  Nurse to notify provider when observation goals have been met and patient is ready for discharge.

## 2021-08-27 NOTE — PROGRESS NOTES
Care Management Discharge Note    Discharge Date: 8/27/21  Expected Time of Departure: 1430    Discharge Disposition: Long Term Care    Discharge Services: None    Discharge DME: None    Discharge Transportation: health plan transportation    Private pay costs discussed: private room/amenity fees, transportation    PAS Confirmation Code: [id number]  Patient/family educated on Medicare website which has current facility and service quality ratings: no    Education Provided on the Discharge Plan:  Yes  Persons Notified of Discharge Plans: patient   Patient/Family in Agreement with the Plan: yes    Handoff Referral Completed: Yes    Additional Information:  Patient will be discharging at 1430 today to [facility]. Patient would like a private room and is aware of the fees. PAS completed. Writer austin KEITH for orders.     PAS-RR    D: Per DHS regulation, SW completed and submitted PAS-RR to [place] [employer] Direct Connect via the Senior LinkAge Line.  PAS-RR confirmation # is : [id number]    I: SW spoke with patient and they are aware a PAS-RR has been submitted.  SW reviewed with patient that they may be contacted for a follow up appointment within 10 days of hospital discharge if their SNF stay is < 30 days.  Contact information for Senior LinkAge Line was also provided.    A: patient verbalized understanding.    P: Further questions may be directed to [facility] LinkAge Line at #[phone], option #4 for PAS-RR staff.          LINDA Bajwa

## 2021-08-27 NOTE — PROGRESS NOTES
Observation Goals     -diagnostic tests and consults completed and resulted Pending  -safe disposition plan has been identified Not Met  Nurse to notify provider when observation goals have been met and patient is ready for discharge

## 2021-08-27 NOTE — PROGRESS NOTES
"Ortho Progress Note     Subjective:  Seen and examined again this morning.    Patient has a Crocker in place due to multiple episodes of urinary retention.  Intact with the patient, she has noted difficulty urinating now for many months, thinks it came on early in 2021.  Symptoms of since that time of been relatively stable.  Denies any numbness or tingling in the saddle region.  Does note occasional episodes of fecal incontinence to have occurred over the last month or two, though she notes it is not frequent.    Continues to note weakness in the bilateral legs, unable to stand as a result.  As she notes, she thinks her legs have gotten very weak from laying in bed for several months now.    Objective:  /48 (BP Location: Left arm)   Pulse 75   Temp 97  F (36.1  C) (Oral)   Resp 16   Ht 1.499 m (4' 11\")   Wt 81.6 kg (180 lb)   LMP  (LMP Unknown)   SpO2 92%   BMI 36.36 kg/m    Gen: A&Ox3, no acute distress  CV: 2+ dp/pt pulses, capillary refill < 2sec  Resp: breathing equal and non-labored, no wheezing  MSK:      Spine:   Sensation:      R       L    L3:   Intact   Intact    L4:   Intact   Intact    L5:   Intact   Intact    S1:   Intact   Intact     Motor:     R L    L3: Psoas    3  3    L4:   Quad    5  5    L4: TA   5 5    L5: EHL    5  5    S1: Eversion/PF  5  5       Notes pain in the entire lower extremity with passive straight leg raise.   Notes pain across the anterior knees with manipulation of the knees or palpation of the knee joint lines.        Hemoglobin   Date Value Ref Range Status   08/22/2021 12.9 11.7 - 15.7 g/dL Final   07/22/2021 12.7 11.7 - 15.7 g/dL Final   12/28/2020 13.1 11.7 - 15.7 g/dL Final   12/20/2020 12.8 11.7 - 15.7 g/dL Final         Assessment/Plan: Chronic L3 compression fracture with severe central canal stenosis.    -I again discussed with the patient today, then also called her daughter and discussed via phone the patient's condition with regards to her spine.    I " noted that it is highly likely the urinary symptoms are coming from the stenosis at the L3 level.  It is more difficult for me to say though if the leg symptoms she is having are coming from the spine, or coming from the bilateral knees, as the knees are the epicenter of pain without significant proximal or extension.  The patient's weakness when she stands could be explained by the stenosis also, or could be due to significant deconditioning after spending many months laying in bed.    I did note that surgery (laminectomy L2-4) is something that we could consider to try to take pressure off of the nerves.  However I am uncertain as to how likely she would be to feel improvements at this point.  As her urinary symptoms have been now going on for many months, it is possible that she would not experience any recovery.  Additionally the weakness she is having I am uncertain if would improve due to the significant deconditioning that she has.    However in the setting of the internal medicine team noting that surgical intervention will be very high risk, the decision to go to surgery is much harder to make.  In talking with the patient this morning, she does not think that she would like to undergo surgery, which is reasonable.  Speaking with her daughter as well, her daughter also does not think that that is a great option given the risk profile associated, and the uncertainty with regards to potential benefit.    The patient and her daughter may contact me for further discussion if they would like.  For the time being we will not plan on surgery.      Total time of this encounter, including non-face-to-face time was 30 minutes.     Tanner Sanchez MD  Orthopedic Spine Surgery  [facility]

## 2021-08-27 NOTE — PROGRESS NOTES
Observation Goals     -Diagnostic tests and consults completed and resulted :Pending    -Safe disposition plan has been identified: Not Met      Nurse to notify provider when observation goals have been met and patient is ready for discharge      A/O x4. VSS on RA. C/o back pain, prn and scheduled dilaudid given with relief. Jonn reg diet. 1500mL fluid restriction. A2/ lift. Crocker in place. IV SL. Discharge to [facility] TCU pending.

## 2021-08-27 NOTE — DISCHARGE SUMMARY
Hospitalist Discharge Summary      Date of Admission:  8/22/2021  Date of Discharge:  8/27/2021  Discharging Provider: Zahira Boyd MD, FACP    Discharge Diagnoses   Generalized weakness.  Chronic hyponatremia, improving.  SIADH, stable.  Urinary retention, s/p Crocker catheter placement.  Lumbar compression fracture, sequela.  Osteoporosis.  Spinal stenosis of lumbar region, stable  Essential hypertension.  History of non-Hodgkin's lymphoma.  Rheumatoid arthritis    Follow-ups Needed After Discharge   Follow-up Appointments     Follow Up and recommended labs and tests      Follow up with intermediate physician.  The following labs/tests are   recommended: BMP in 4-5 days .             Unresulted Labs Ordered in the Past 30 Days of this Admission     No orders found from 7/23/2021 to 8/23/2021.          Discharge Disposition   Discharged to short-term care facility  Condition at discharge: Stable    Hospital Course   Cumulative Summary: Nicolás Wyman is a 81 year old female with numerous medical problems including hypertension, rheumatoid arthritis, long-term steroid use, remote history of non-Hodgkin's lymphoma (diagnosed in 2014), interstitial pulmonary fibrosis, SIADH, compression fracture of L3, lumbar spinal stenosis, osteoporosis who is admitted on 8/22/2021. She has worsening weakness, especially in her legs, had a minor fall yesterday.  Here are further details regarding her current hospitalization      Generalized weakness; most likely multifactorial secondary to her rheumatoid arthritis, long-term steroid use compression fractures, lumbar spinal stenosis and osteoporosis which are resulting into decrease exercise tolerance and overall deterioration.    -- Patient was admitted and we had multiple discussions regarding her back pain and lower extremities weakness   -- Patient has also been seen by ortho spine surgery during this stay including today , at this time , idea  of surgery is not entertained due to her multiple chronic co morbidities, patient is also not eager to get surgery especially as at this point , also according to surgery, it is not clear that any of her symptoms will improve even if patient go through any surgical interventions.  --Patient was also dealing with significant constipation in the last week but has been a started on bowel regimen and did have a bowel movement this morning.  --During the hospitalization she was also started on Flomax, voiding trial was done in 48 hours which she failed, currently Crocker catheter is placed right now but she will be continued on Flomax and voiding trial can be attempted in 1 week.  --According to orthospine surgery, there is a possibility patient might need long-term Crocker catheter if she is having urinary issues secondary to her spinal stenosis.  --Her sodium levels are slowly improving as intended with the addition of salt tablet and fluid restriction, her sodium is improved to 130.  --Patient has been evaluated by physical and Occupational Therapy and will be discharged today later to long-term care facility.     Hyponatremia  SIADH (syndrome of inappropriate ADH production) (H)   Patient's sodium values going back many months (December/2020) range from 116-125 mEq/L  sodium is improved to 128   She was seen by nephrology during December/2020 stay at [facility] where elevated urine osmolality suggested SIADH  During the hospitalization patient was placed on fluid restriction which has been liberalized to 1500 mL in the last few days.    --Patient will be discharged on 1500 mL fluid restriction.  --Her sodium levels are improved to 130 this morning.  --She will be continued on sodium tablet twice daily.  I have recommended continuing the salt tablet twice daily till her levels are more than 135.  --Once her sodium is over 135, probably will decrease salt tablet to once a day and repeat BMP in 1 week time.  --If her  sodium starts to go down again, then will recommend changing her back to salt tablet twice a day.     Urinary retention: Patient has already required straight cath twice.  Proteus urinary tract infection: Urine culture is growing more than 100,000 colonies of Proteus mirabilis, resistant to tetracycline and nitrofurantoin.    --Patient will be finishing the course of antibiotics after the discharge.  --As above, initially Crocker catheter was placed and voiding trial was given 48 hours after starting the Flomax which patient failed.  --Crocker catheter has been placed again, patient will be continued on Flomax, will recommend giving voiding trial in another week.  If patient fails then probably will need a Crocker catheter and might need urology evaluation as an outpatient.  --According to orthospine, some of her urinary retention issues might be coming from his spinal stenosis and if her symptoms continues to progress then patient might need chronic Crocker in the future.     Lumbar compression fracture, sequela  Osteoporosis   Spinal stenosis of lumbar region     --Lumbar CT showed high-grade compression fracture with fragmentation at L3 with progressive loss of height contributing to severe spinal stenosis  -- We have discussed with patient and family that patient's numerous medical problems, age and debility likely make the risk of any surgical option prohibitive.  -- Ortho consult requested, because of progressive weakness.  This likely represented slow, chronic advancement of pain and debility from her original compression fracture injury  --Was evaluated by orthospine, appreciate Dr. Sanchez help.  According to surgery patient has severe osteoporosis and therefore is not a candidate for any instrumented case.  They are not recommending any operative management to address the compression fracture.  Additionally she is not a candidate for kyphoplasty or vertebroplasty due to the posterior element involvement of the high  "risk of cement extravasation which could cause catastrophic neurological injury.  --Regarding the lower extremity pain, it is possible that it could be managed with a laminectomy L2-L4 however again it is very difficult to differentiate whether or not this is coming from the knee or from the back.  According to Ortho it might be more likely coming from the knees.  --Orthospine is recommending ongoing therapy for deconditioning and immobility.     Essential hypertension, benign:   Patient says she was on amlodipine but this has been discontinued     --Continue beta-blocker  --Continue angiotensin receptor blocker  -- Started patient on Norvasc 2.5 mg p.o. daily during the hospitalization for better BP control      History of non-Hodgkin's lymphoma:  Receives care at the [facility]  Last hematology visit at [facility] was 7/22/2020 and notes indicate, \"May 2004 had left axillary nodes.  Biopsy showed low-grade B-cell lymphoma, plasmacytic differentiation.  There was widespread joshua involvement, especially the left groin, and skin involvement.  Also biopsied was some skin consistent with her connective tissue disease.  She got Rituxan and bendamustine.  Completed four cycles in September 2014.  Coming up to three and a half years ago.  She was then observed.  In January 2016 she got a lesion on her left thigh.  Biopsy showed low-grade lymphoma.  It was radiated by Dr. Cadena.  Followup PET in 2016 October showed a good response  When oncology saw her in April of 2019 there was a question of uptake in the left leg. A biopsy was done and showed recurrent lymphoma in that area and oncologist asked her radiotherapist to do local radiation to that area which was completed in July of 2019.     --She would now be 1 year out from the last radiation therapy. She has now had a cycle of chemotherapy and 2 separate instances of local radiation in her PET scan now is been doing very well for a year.\"  --Follow-up in spring, 2021 was " recommended, patient could not attend that appointment  --Suggest that they make a virtual follow-up visit with [facility] hematology     Rheumatoid arthritis (HCC)  This is longstanding, patient says he was diagnosed more than 40 years ago and she does not have significant pain related to her arthritis  --Continue hydroxychloroquine, ibuprofen, doxycycline  --Continue low-dose prednisone     Patient was seen and examined on the day of discharge ,she is feeling well, does not have any complaints , I did review the discharge medications and instructions with the patient and plan for her to follow up with the PCP after the hospitalization .patient was in agreement , she is discharged in stable condition to Long term care.    Consultations This Hospital Stay   CARE MANAGEMENT / SOCIAL WORK IP CONSULT  PHYSICAL THERAPY ADULT IP CONSULT  OCCUPATIONAL THERAPY ADULT IP CONSULT  ORTHOPEDIC SURGERY IP CONSULT  SPINE SURGERY ADULT IP CONSULT  PHYSICAL THERAPY ADULT IP CONSULT  OCCUPATIONAL THERAPY ADULT IP CONSULT    Code Status   Full Code    Time Spent on this Encounter   I, Zahira Boyd MD, personally saw the patient today and spent greater than 30 minutes discharging this patient.     Zahira Boyd MD, FACP  [facility]  [address] [place] [id number]  Phone: [phone]  ______________________________________________________________________    Physical Exam   Vital Signs: Temp: 98  F (36.7  C) Temp src: Axillary BP: 131/56 Pulse: 63   Resp: 18 SpO2: 95 % O2 Device: None (Room air)    Weight: 180 lbs 0 oz    Physical Exam  Vitals and nursing note reviewed.   Constitutional:       Appearance: She is well-developed.   HENT:      Head: Normocephalic and atraumatic.   Eyes:      Conjunctiva/sclera: Conjunctivae normal.      Pupils: Pupils are equal, round, and reactive to light.   Neck:      Thyroid: No thyromegaly.   Cardiovascular:      Rate and Rhythm: Normal rate and regular rhythm.       Heart sounds: Normal heart sounds. No murmur heard.     Pulmonary:      Effort: Pulmonary effort is normal. No respiratory distress.      Breath sounds: Normal breath sounds. No wheezing.   Abdominal:      General: Bowel sounds are normal.      Palpations: Abdomen is soft.      Tenderness: There is no abdominal tenderness. There is no guarding or rebound.   Musculoskeletal:         General: No deformity. Normal range of motion.      Cervical back: Normal range of motion and neck supple.   Skin:     General: Skin is warm and dry.   Neurological:      Mental Status: She is alert and oriented to person, place, and time.   Psychiatric:         Behavior: Behavior normal.          Primary Care Physician   Yocasta Hernandez    Discharge Orders      General info for SNF    Length of Stay Estimate: Short Term Care: Estimated # of Days <30  Condition at Discharge: Improving  Level of care:skilled   Rehabilitation Potential: Good  Admission H&P remains valid and up-to-date: Yes  Recent Chemotherapy: N/A  Use Nursing Home Standing Orders: Yes     Mantoux instructions    Give two-step Mantoux (PPD) Per Facility Policy Yes     Follow Up and recommended labs and tests    Follow up with assisted physician.  The following labs/tests are recommended: BMP in 4-5 days .     Activity - Up with nursing assistance     Reason for your hospital stay    You were admitted to the hospital secondary to worsening back pain and generalized weakness , you were also found to have worsening of your chronic sodium levels which are improved now .     Intake and output    Every shift     Full Code     Physical Therapy Adult Consult    Evaluate and treat as clinically indicated.    Reason:  Acute on chronic debilitation , spinal stenosis     Occupational Therapy Adult Consult    Evaluate and treat as clinically indicated.    Reason:  Acute on chronic debilitation , spinal stenosis     Fall precautions     Advance Diet as Tolerated    Follow this diet  upon discharge: Orders Placed This Encounter      Fluid restriction 1500 ML FLUID      Regular Diet Adult         Significant Results and Procedures   Results for orders placed or performed during the hospital encounter of 08/22/21   XR Knee Bilateral G/E 4 Views    Narrative    EXAM: XR KNEE BILATERAL G/E 4 VW  LOCATION: [facility]  DATE/TIME: 8/22/2021 12:31 PM    INDICATION: check for fracture, pain after fall  COMPARISON: None.      Impression    IMPRESSION:   RIGHT KNEE: TKA with patellar resurfacing, longstem femoral and tibial components. Normal patellar alignment. No knee joint effusion. No fractures are evident.    LEFT KNEE: TKA with patellar resurfacing. No knee joint effusion. No fractures are evident.   XR Shoulder Right G/E 3 Views    Narrative    EXAM: XR SHOULDER RIGHT G/E 3 VIEWS  LOCATION: [facility]  DATE/TIME: 08/22/2021, 12:31 PM    INDICATION: Check for fracture- pain after fall.  COMPARISON: None.    FINDINGS: There is a 0.8 cm calcific/ossific focus adjacent to the humeral head, which could represent mild rotator cuff region calcific tendinitis/bursitis. Otherwise, unremarkable.      Impression    IMPRESSION: No fracture identified.      Head CT w/o contrast    Narrative    CT SCAN OF THE HEAD WITHOUT CONTRAST   8/22/2021 1:56 PM     HISTORY: Head trauma, minor (Age >= 65y); fall, weakness.    TECHNIQUE:  Axial images of the head and coronal reformations without  IV contrast material. Radiation dose for this scan was reduced using  automated exposure control, adjustment of the mA and/or kV according  to patient size, or iterative reconstruction technique.    COMPARISON: None.    FINDINGS:     Intracranial contents: Mild chronic ischemic change periventricular  deep white matter both cerebral hemispheres. Corpus callosum is  satisfactory. No extra-axial blood products or fluid collection.  Vascular calcification. Position of cerebellar  tonsils is  satisfactory. Sella shows no acute abnormality. There is no evidence  of intracranial hemorrhage, mass, acute infarct or anomaly.    Visualized orbits/sinuses/mastoids:  The visualized portions of the  sinuses and mastoids appear normal. No acute orbital process.    Osseous structures/soft tissues:  No fracture of the calvarium or  skull base. Degenerative changes involve the TMJs bilaterally. No  significant swelling of the facial or scalp tissues is evident.      Impression    IMPRESSION: Mild chronic ischemic changes intracranially as above. No  mass, mass effect or evidence for acute/evolving infarction is noted.      VIANEY GARZA MD         SYSTEM ID:  [id number]   Lumbar spine CT w/o contrast    Narrative    CT OF THE LUMBAR SPINE WITHOUT CONTRAST  8/22/2021 1:56 PM     COMPARISON: 12/16/2020    HISTORY: Worsening of chronic low back pain after fall, past history  of compression fracture.     TECHNIQUE: Axial images of the lumbar spine were acquired without  intravenous contrast. Multiplanar reformations were created from the  axial source images.      FINDINGS:  5 lumbar type vertebral bodies are presumed for counting  purposes. High-grade compression fracture L3. This has progressed from  the previous study where there was mild to moderate superior endplate  compression at L3. Today's appearance shows fragmentation superior and  inferior endplate, retropulsion, and anterior displacement and  fragmentation of the L3 vertebral body. Fracture lines do involve the  pedicles bilaterally. Loss of height is about 85-90%. This is more  prominent to the left of midline with respect to height loss series 9  image 21. Fracture of the left pedicle, lamina and transverse process  is incomplete at L3 series 7 image 55. The retropulsion contributes to  severe spinal stenosis at L3. Mild loss of height inferior endplate L2  appears chronic stable from prior study including the morphology of  the anterior  inferior cortex series 10 image 29. Otherwise vertebral  body height is satisfactory. There is stable 2.5 mm anterior  subluxation L4-5. Degenerative interspace narrowing upper and lower  lumbar levels. Demineralization. Satisfactory sacral alignment. Facet  joint spondylosis is noted. Gaseous vacuum phenomenon at L2-3 L3-4.  Marked interspace narrowing L5-S1. Leftward lumbar curve apex L2.  Sacral neural foramina are intact. No evidence for displaced lower rib  fractures.    There is no evidence for retroperitoneal solid mass or adenopathy.  There is minimal edema suggested about L3 which may indicate a  subacute time course for this progressive compression at L3.  Satisfactory and symmetric psoas appearance otherwise. Distention of  the urinary bladder. Vascular calcification. No retroperitoneal  hematoma is identified.    There is no additional evidence for severe spinal stenosis, with  moderate spinal canal narrowing at L4-5. At the L2-3 level there is  moderate foraminal narrowing present on the right and mild foraminal  narrowing on the left. At L3-4, there is severe left-sided foraminal  stenosis and possible correlation to left L3 radiculopathy due to  contact of exiting left L3 nerve root series 8 image 52. There is also  moderate to severe left L4 subarticular recess narrowing. Mild to  moderate right-sided L3-4 foraminal stenosis. No additional severe  foraminal stenosis.      Impression    IMPRESSION:  1. High grade compression fracture with fragmentation at L3 is  demonstrated, with progression of the loss of height from the plain  from study which is most recent available comparison, performed  12/16/2020. Fracture lines involve the posterior elements bilaterally,  more prominent on the left with incomplete fracture involving the left  pedicle and lamina and transverse process at L3. There is stable  chronic mild loss of height and endplate irregularity inferiorly at  L2. No additional or new  compressions are noted elsewhere in the  lumbar spine.  2. Loss of height at L3 85-90% with retropulsion contributing to  severe spinal stenosis. There is also severe left L3-4 foraminal  compromise and moderate to severe narrowing left L4 subarticular  recess.  3. There is no evidence for epidural hematoma with minimal paraspinous  edema. Acute or subacute in time course please correlate with the  clinical history and presentation.  4. Additional details above.      Radiation dose for this scan was reduced using automated exposure  control, adjustment of the mA and/or kV according to patient size, or  iterative reconstruction technique    VIANEY GARZA MD         SYSTEM ID:  [id number]   XR Chest 2 Views    Narrative    CHEST TWO VIEWS 8/22/2021 1:28 PM     HISTORY: Fall, weakness, check for pneumothorax or pneumonia.    COMPARISON: 9/8/2015    FINDINGS: No airspace consolidation, pneumothorax, or pleural  effusion.       Impression    IMPRESSION: No radiographic evidence of acute chest abnormality.     CHRISTA ASTORGA MD         SYSTEM ID:  [id number]       Discharge Medications   Current Discharge Medication List      START taking these medications    Details   cefdinir (OMNICEF) 300 MG capsule Take 1 capsule (300 mg) by mouth every 12 hours for 4 days    Associated Diagnoses: Spinal stenosis of lumbar region without neurogenic claudication; Urinary retention      polyethylene glycol (MIRALAX) 17 GM/Dose powder Take 17 g by mouth 2 times daily  Qty: 510 g    Associated Diagnoses: Slow transit constipation      sodium chloride 1 GM tablet Take 1 tablet (1 g) by mouth 2 times daily (with meals)    Associated Diagnoses: SIADH (syndrome of inappropriate ADH production) (H)      tamsulosin (FLOMAX) 0.4 MG capsule Take 1 capsule (0.4 mg) by mouth daily    Associated Diagnoses: Spinal stenosis of lumbar region without neurogenic claudication; Urinary retention         CONTINUE these medications which have CHANGED     Details   amLODIPine (NORVASC) 2.5 MG tablet Take 1 tablet (2.5 mg) by mouth daily    Associated Diagnoses: Spinal stenosis of lumbar region without neurogenic claudication; Essential hypertension, benign      HYDROmorphone (DILAUDID) 2 MG tablet Take 1 tablet (2 mg) by mouth every 4 hours as needed for moderate to severe pain  Qty: 15 tablet, Refills: 0    Comments: Future refills by PCP Dr. Yocasta Hernandez with phone number [phone].  Associated Diagnoses: Spinal stenosis of lumbar region without neurogenic claudication         CONTINUE these medications which have NOT CHANGED    Details   acetaminophen (TYLENOL) 325 MG tablet Take 3 tablets (975 mg) by mouth 3 times daily    Associated Diagnoses: Closed compression fracture of L3 lumbar vertebra, initial encounter (H); Bilateral low back pain without sciatica, unspecified chronicity      acetylcysteine (N-ACETYL CYSTEINE) 600 MG CAPS capsule Take 1 capsule (600 mg) by mouth daily      albuterol (PROAIR HFA/PROVENTIL HFA/VENTOLIN HFA) 108 (90 Base) MCG/ACT inhaler Inhale 2 puffs into the lungs every 4 hours as needed for shortness of breath / dyspnea or wheezing      aspirin 81 MG tablet Take 1 tablet (81 mg) by mouth daily  Qty: 30 tablet      atenolol (TENORMIN) 100 MG tablet Take 1 tablet (100 mg) by mouth daily  Qty: 90 tablet, Refills: 3    Associated Diagnoses: Essential hypertension, benign      cholecalciferol (VITAMIN D3) 1000 UNIT tablet Take 1 tablet (1,000 Units) by mouth daily  Qty: 100 tablet, Refills: 3      diclofenac (VOLTAREN) 1 % topical gel Apply topically daily as needed for moderate pain      ferrous sulfate (IRON) 325 (65 Fe) MG tablet Take 1 tablet (325 mg) by mouth daily (with breakfast)  Qty: 30 tablet, Refills: 2      gabapentin (NEURONTIN) 100 MG capsule Take 2 capsules (200 mg) by mouth every morning    Comments: With 800 QAM for AM dose of 1000  Associated Diagnoses: Lumbar compression fracture, sequela; Spinal stenosis of lumbar  region, unspecified whether neurogenic claudication present      gabapentin (NEURONTIN) 800 MG tablet Take 1 tablet (800 mg) by mouth 3 times daily  Qty:      Comments: With 200 QAM for AM dose of 1000  Associated Diagnoses: Lumbar compression fracture, sequela; Spinal stenosis of lumbar region, unspecified whether neurogenic claudication present      HEMP OIL OR EXTRACT OR OTHER CBD CANNABINOID, NOT MEDICAL CANNABIS, Take 1 tablet by mouth daily CBD gummy      hydroxychloroquine (PLAQUENIL) 200 MG tablet Take 1 tablet by mouth 2 times daily.  Qty: 60 tablet, Refills: 1      IBANdronate (BONIVA) 150 MG tablet Take 1 tablet (150 mg) by mouth every 30 days Take 60 minutes before am meal with 8 oz. water. Remain upright for 60 minutes.  Qty: 3 tablet, Refills: 3    Associated Diagnoses: Osteopenia, unspecified location      ibuprofen (ADVIL/MOTRIN) 200 MG tablet Take 200 mg by mouth 2 times daily      levOCARNitine (CARNITOR) 330 MG tablet Take 990 mg by mouth 2 times daily       Lidocaine (LIDOCARE) 4 % Patch Place 1 patch onto the skin every 24 hours To prevent lidocaine toxicity, patient should be patch free for 12 hrs daily.  Qty: 10 patch    Associated Diagnoses: Closed compression fracture of L3 lumbar vertebra, initial encounter (H); Bilateral low back pain without sciatica, unspecified chronicity      losartan (COZAAR) 100 MG tablet Take 1 tablet (100 mg) by mouth daily  Qty: 90 tablet, Refills: 3    Associated Diagnoses: Essential hypertension, benign      medical cannabis (Patient's own supply) See Admin Instructions (The purpose of this order is to document that the patient reports taking medical cannabis.  This is not a prescription, and is not used to certify that the patient has a qualifying medical condition.)      Melatonin 1 MG CHEW Take 3 mg by mouth At Bedtime      naloxone (NARCAN) 4 MG/0.1ML nasal spray Spray 4 mg into one nostril alternating nostrils once as needed for opioid reversal       potassium chloride ER (KLOR-CON M) 20 MEQ CR tablet Take 20 mEq by mouth      predniSONE (DELTASONE) 5 MG tablet Take 5 mg by mouth daily          STOP taking these medications       doxycycline (VIBRAMYCIN) 100 MG capsule Comments:   Reason for Stopping:             Allergies   Allergies   Allergen Reactions     Ace Inhibitors Cough     Latex GI Disturbance     lip swelling     Liquid Adhesive Rash     Tape [Adhesive Tape] Rash

## 2021-08-27 NOTE — PROGRESS NOTES
Observation Goals     -Diagnostic tests and consults completed and resulted :Pending    -Safe disposition plan has been identified: Not Met      Nurse to notify provider when observation goals have been met and patient is ready for discharge

## 2021-08-27 NOTE — PROGRESS NOTES
Patient has been discharged in wheelchair w/ transportation on 8/27 @ 2:30pm. All belongings sent with pt's family.

## 2021-08-27 NOTE — PROGRESS NOTES
Pt is A&Ox4, VSS on RA, Ax2 w/ lift, IV SL, Reports of pain, managed with schedule tylenol, ibuprofen and Lidocane patch, Crocker in place, scattered bruises, BM x2, continue to monitor      Observation Goals     -diagnostic tests and consults completed and resulted Pending  -safe disposition plan has been identified Not Met  Nurse to notify provider when observation goals have been met and patient is ready for discharge
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
## 2021-08-30 NOTE — PLAN OF CARE
Physical Therapy Discharge Summary    Reason for therapy discharge:    Discharged to long term care facility.    Progress towards therapy goal(s). See goals on Care Plan in [facility] electronic health record for goal details.  Goals not met.  Barriers to achieving goals:   discharge from facility.    Therapy recommendation(s):    Continued therapy is recommended.  Rationale/Recommendations:  eval and treat with skilled PT services.

## 2021-08-30 NOTE — PROGRESS NOTES
"[facility] GERIATRIC [facility]  PRIMARY CARE PROVIDER AND CLINIC:  Yocasta Hernandez MD, [address] [facility] [address] / [facility] [address]  Chief Complaint   Patient presents with     Hospital F/U     [facility]      [facility] Record Number:  [id number]  Place of Service where encounter took place:  [facility] (U) [25]    Nicolás Wyman  is a 81 year old  (1940), admitted to the above facility from  [facility]. Hospital stay 8/22/21 through 8/27/21.. PMH significant for hypertension, rheumatoid arthritis, long-term steroid use, remote history of non-Hodgkin's lymphoma (diagnosed in 2014), interstitial pulmonary fibrosis, SIADH, compression fracture of L3, lumbar spinal stenosis, osteoporosis.      HPI:    Recent hospital stay for worsening weakness & fall OOB. Weakness thought multifactorial 2/2 RA, compression fractures d/t long-term steroid use, lumbar spinal stenosis & osteoporosis. Surgery not pursued d/t multiple chronic continue morbidities. While inpatient, patient also experienced constipation, hyponatremia (started salt tab) & urinary retention. IF urinary issues 2/2 spinal stenosis, may require niño long-term.  Today patient reports she is feeling \"stiff & sore all over\" before getting out of bed. She felt she couldn't move while in bed which led to feeling sore. She endorses to \"shooting pain all over\" when asked about leg pain. Denies n/t. Patient denies SOB, CP or cough. Patient denies abdominal pain or cramping or discomfort with niño catheter. Non-ambulatory.  BP Readings from Last 3 Encounters:   08/27/21 127/62   08/27/21 136/48   12/29/20 (!) 167/73     Wt Readings from Last 4 Encounters:   08/28/21 75.8 kg (167 lb 1.6 oz)   08/22/21 81.6 kg (180 lb)   06/14/21 78.5 kg (173 lb)   12/21/20 88.2 kg (194 lb 8 oz)       CODE STATUS/ADVANCE DIRECTIVES DISCUSSION:  Full Code  CPR/Full code   ALLERGIES:   Allergies   Allergen Reactions     Ace Inhibitors Cough     " Latex GI Disturbance     lip swelling     Liquid Adhesive Rash     Tape [Adhesive Tape] Rash      PAST MEDICAL HISTORY:   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    removal right TKA 6/12     Lumbar compression fracture, sequela 6/14/2021     Non Hodgkin's lymphoma (H) 6/14    chemo x4, clearing by PET     Osteoporosis 6/14/2021     Other specified glaucoma 6/14/2021     Pulmonary interstitial fibrosis (H)      Rheumatoid arthritis(714.0)     [facility]     SIADH (syndrome of inappropriate ADH production) (H) 6/14/2021     Spinal stenosis of lumbar region 6/14/2021     Steroid long-term use      Urinary retention 6/14/2021      PAST SURGICAL HISTORY:   has a past surgical history that includes REMOVAL GALLBLADDER (1981); CORRECT BUNION,SIMPLE (2002); VAGINAL HYSTERECTOMY (1981); Arthroplasty knee (11/7/2011); Exchange poly component arthroplasty knee (11/26/2011); Irrigation and debridement knee, combined (11/26/2011); Arthroplasty revision knee (12/29/2011); Irrigation and debridement knee, combined (12/29/2011); Irrigation and debridement knee, combined (2/9/2012); and NONSPECIFIC PROCEDURE (6/22/12).  FAMILY HISTORY: family history includes Eye Disorder in her mother; Family History Negative in her father; Heart Disease in her mother; Hypertension in her mother.  SOCIAL HISTORY:   reports that she has never smoked. She has never used smokeless tobacco. She reports that she does not drink alcohol and does not use drugs.  Patient's living condition: lives with spouse    Post Discharge Medication Reconciliation Status: discharge medications reconciled and changed, per  note/orders  Current Outpatient Medications   Medication Sig     acetaminophen (TYLENOL) 325 MG tablet Take 3 tablets (975 mg) by mouth 3 times daily     acetylcysteine (N-ACETYL CYSTEINE) 600 MG CAPS capsule Take 1 capsule (600 mg) by mouth daily     albuterol (PROAIR HFA/PROVENTIL HFA/VENTOLIN HFA) 108 (90 Base) MCG/ACT inhaler Inhale 2 puffs into the lungs every 4 hours as needed for shortness of breath / dyspnea or wheezing     amLODIPine (NORVASC) 2.5 MG tablet Take 1 tablet (2.5 mg) by mouth daily     aspirin 81 MG tablet Take 1 tablet (81 mg) by mouth daily     atenolol (TENORMIN) 100 MG tablet Take 1 tablet (100 mg) by mouth daily     cholecalciferol (VITAMIN D3) 1000 UNIT tablet Take 1 tablet (1,000 Units) by mouth daily     diclofenac (VOLTAREN) 1 % topical gel Apply topically daily as needed for moderate pain     ferrous sulfate (IRON) 325 (65 Fe) MG tablet Take 1 tablet (325 mg) by mouth daily (with breakfast)     gabapentin (NEURONTIN) 100 MG capsule Take 2 capsules (200 mg) by mouth every morning     gabapentin (NEURONTIN) 800 MG tablet Take 1 tablet (800 mg) by mouth 3 times daily     HEMP OIL OR EXTRACT OR OTHER CBD CANNABINOID, NOT MEDICAL CANNABIS, Take 1 tablet by mouth daily CBD gummy     HYDROmorphone (DILAUDID) 2 MG tablet Take 1 tablet (2 mg) by mouth every 4 hours as needed for moderate to severe pain     hydroxychloroquine (PLAQUENIL) 200 MG tablet Take 1 tablet by mouth 2 times daily.     IBANdronate (BONIVA) 150 MG tablet Take 1 tablet (150 mg) by mouth every 30 days Take 60 minutes before am meal with 8 oz. water. Remain upright for 60 minutes.     ibuprofen (ADVIL/MOTRIN) 200 MG tablet Take 200 mg by mouth 2 times daily     levOCARNitine (CARNITOR) 330 MG tablet Take 990 mg by mouth 2 times daily      Lidocaine (LIDOCARE) 4 % Patch Place 1 patch onto the skin every 24 hours To prevent lidocaine toxicity, patient should be patch free for 12 hrs daily.     losartan (COZAAR) 100 MG tablet  "Take 1 tablet (100 mg) by mouth daily     medical cannabis (Patient's own supply) See Admin Instructions (The purpose of this order is to document that the patient reports taking medical cannabis.  This is not a prescription, and is not used to certify that the patient has a qualifying medical condition.)     Melatonin 1 MG CHEW Take 3 mg by mouth At Bedtime     naloxone (NARCAN) 4 MG/0.1ML nasal spray Spray 4 mg into one nostril alternating nostrils once as needed for opioid reversal     polyethylene glycol (MIRALAX) 17 GM/Dose powder Take 17 g by mouth 2 times daily     predniSONE (DELTASONE) 5 MG tablet Take 5 mg by mouth daily      sodium chloride 1 GM tablet Take 1 tablet (1 g) by mouth 2 times daily (with meals)     tamsulosin (FLOMAX) 0.4 MG capsule Take 1 capsule (0.4 mg) by mouth daily     No current facility-administered medications for this visit.       ROS:  10 point ROS of systems including Constitutional, Eyes, Respiratory, Cardiovascular, Gastroenterology, Genitourinary, Integumentary, Musculoskeletal, Psychiatric were all negative except for pertinent positives noted in my HPI.    Vitals:  /62   Pulse 79   Temp 97.5  F (36.4  C)   Resp 18   Ht 1.499 m (4' 11\")   Wt 75.8 kg (167 lb 1.6 oz)   LMP  (LMP Unknown)   SpO2 95%   BMI 33.75 kg/m    Exam:  GENERAL APPEARANCE:  Alert, in no distress, cooperative  ENT:  oral mucous membranes moist  EYES:  Conjunctiva and lids normal  RESP:  lungs clear to auscultation , no respiratory distress  CV:  RRR  ABDOMEN:  bowel sounds normal, soft, non-tender  M/S:   no edema  SKIN:  large bruise RUE  PSYCH:  oriented X 3, affect and mood normal    Lab/Diagnostic data:    Most Recent 3 CBC's:  Recent Labs   Lab Test 08/22/21  1211 07/22/21  0925 12/28/20  1054   WBC 6.7 6.9 9.2   HGB 12.9 12.7 13.1   MCV 95 95 95    296 394     Most Recent 3 BMP's:  Recent Labs   Lab Test 08/27/21  0608 08/26/21  0602 08/25/21  0709 08/24/21  0731 08/23/21  0700 "   * 128* 128* 127* 125*   POTASSIUM  --   --  4.1 5.0 3.8   CHLORIDE  --   --  94 92* 87*   CO2  --   --  36* 31 32   BUN  --   --  9 7 7   CR  --   --  0.42* 0.43* 0.44*   ANIONGAP  --   --  <1* 4 6   KRISTOPHER  --   --  8.4* 8.6 8.4*   GLC  --   --  86 82 78     Most Recent TSH and T4:  Recent Labs   Lab Test 07/22/21 0925 12/18/20  0645   TSH 2.76 5.01*   T4  --  1.05     Ferritin   Date Value Ref Range Status   07/22/2021 465 (H) 10 - 130 ng/mL Final     ASSESSMENT/PLAN:  (M62.81) Generalized muscle weakness  (primary encounter diagnosis)  (W19.XXXD) Fall, subsequent encounter  (T07.XXXA) Multiple bruises  (S32.000S) Lumbar compression fracture, sequela  (M48.061) Spinal stenosis of lumbar region, unspecified whether neurogenic claudication present  (M80.00XS) Osteoporosis with current pathological fracture, unspecified osteoporosis type, sequela  Comment: weakness multifactorial: lumbar CT showed high-grade compression fracture with fragmentation at L3 with progressive loss of height contributing to severe spinal stenosis; currently lift for transfers, non ambulatory; no history of spinal surgery, history of bilat knee surgery   Plan:   --continue Ibandronate Sodium 150 mg monthly for osteoporosis  --continue gabapentin 1,000 mg q AM & 800 mg BID  --continue Tylenol 975 mg TID, ibuprofen 200 mg BID, dilaudid 2 mg q 4 hr prn, lidocaine patch daily - would like to see ibuprofen discontinued  --physical therapy & occupational therapy to eval/treat for strengthening, balance, safety   **Was evaluated by orthospine: According to surgery patient has severe osteoporosis and therefore is not a candidate for any instrumented case.  They are not recommending any operative management to address the compression fracture.  Additionally she is not a candidate for kyphoplasty or vertebroplasty due to the posterior element involvement of the high risk of cement extravasation which could cause catastrophic neurological  injury.    (E22.2) SIADH (syndrome of inappropriate ADH production) (H)  (E87.1) Hyponatremia  Comment: chronic hyponatremia: Na+ levels range from 116-125 since 12/2020; seen by Nephrology 12/2020- elevated urine osmolality suggested SIADH  Plan:   --continue sodium chloride 1 g BID for now, consider lowering dose if levels continue to improve   --BMP scheduled for tomorrow, 8/31  --continue fluid restriction 1,500 mL/day     (R33.9) Urinary retention  Comment: acute; failed voiding trial in hospital   Plan:   --continue Flomax 0.4 mg daily   --continue niño cath for now- voiding trial next week Tuesday (d/t void 8hr after removal of niño catheter, PVR every shift x2 days, straight cath if PVR >350cc, replace niño cath if straight cath x3)     (D64.9) Anemia  Comment: most recent iron level elevated in 400s with normal Hgb  Plan:  --discontinue ferrous sulfate    --follow Hgb     (I10) Essential hypertension  (E87.6) Hypokalemia   Comment: only single BP on file  Plan:   --start daily vitals   --continue amlodipine 2.5 mg daily, atenolol 100 mg daily, losartan 100 mg daily  --discontinue KCl 20 mEq daily   --BMP tomorrow     (C85.90) Non-Hodgkin's lymphoma, unspecified body region, unspecified non-Hodgkin lymphoma type (H)  Comment: chronic; receives care at [facility] ER, hematology- history of chemo & radiation   Plan:   --schedule virtual follow up appt with [facility]     (M06.9) Rheumatoid arthritis involving multiple sites, unspecified whether rheumatoid factor present (H)  Comment: chronic, diagnosed over 40 yrs ago  Plan:    --continue prednisone 2.5 mg daily, Plaquenil 200 gm BID    Orders:  1. Start daily vitals   2. Discontinue ferrous sulfate   3. Voiding trial next week Tuesday 9/7 (d/t void 8hr after removal of niño catheter, PVR every shift x2 days, straight cath if PVR >350cc, replace niño cath if straight cath x3)  *orders called into facility     Total time spent with patient visit at the  skilled nursing facility was 36 including patient visit and review of past records. Greater than 50% of total time spent with counseling and coordinating care due to pain control, starting therapies, reviewed medications, discussed niño catheter.     Electronically signed by:  Alanna Castañeda NP

## 2021-08-30 NOTE — LETTER
"    8/30/2021        RE: Nicolás Wyman  [address]  [facility] [place] [address]        [facility]  PRIMARY CARE PROVIDER AND CLINIC:  Yocasta Hernandez MD, [address] / [facility] [address]  Chief Complaint   Patient presents with     Hospital F/U     Establish Care      [facility] Medical Record Number:  [id number]  Place of Service where encounter took place:  [facility] [[id number]]    Nicolás Wyman  is a 81 year old  (1940), admitted to the above facility from  [facility]. Hospital stay 8/22/21 through 8/27/21.. PMH significant for hypertension, rheumatoid arthritis, long-term steroid use, remote history of non-Hodgkin's lymphoma (diagnosed in 2014), interstitial pulmonary fibrosis, SIADH, compression fracture of L3, lumbar spinal stenosis, osteoporosis.      HPI:    Recent hospital stay for worsening weakness & fall OOB. Weakness thought multifactorial 2/2 RA, compression fractures d/t long-term steroid use, lumbar spinal stenosis & osteoporosis. Surgery not pursued d/t multiple chronic continue morbidities. While inpatient, patient also experienced constipation, hyponatremia (started salt tab) & urinary retention. IF urinary issues 2/2 spinal stenosis, may require niño long-term.  Today patient reports she is feeling \"stiff & sore all over\" before getting out of bed. She felt she couldn't move while in bed which led to feeling sore. She endorses to \"shooting pain all over\" when asked about leg pain. Denies n/t. Patient denies SOB, CP or cough. Patient denies abdominal pain or cramping or discomfort with niño catheter. Non-ambulatory.  BP Readings from Last 3 Encounters:   08/27/21 127/62   08/27/21 136/48   12/29/20 (!) 167/73     Wt Readings from Last 4 Encounters:   08/28/21 75.8 kg (167 lb 1.6 oz)   08/22/21 81.6 kg (180 lb)   06/14/21 78.5 kg (173 lb)   12/21/20 88.2 kg (194 lb 8 oz)       CODE STATUS/ADVANCE DIRECTIVES DISCUSSION:  Full Code "  CPR/Full code   ALLERGIES:   Allergies   Allergen Reactions     Ace Inhibitors Cough     Latex GI Disturbance     lip swelling     Liquid Adhesive Rash     Tape [Adhesive Tape] Rash      PAST MEDICAL HISTORY:   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    removal right TKA 6/12     Lumbar compression fracture, sequela 6/14/2021     Non Hodgkin's lymphoma (H) 6/14    chemo x4, clearing by PET     Osteoporosis 6/14/2021     Other specified glaucoma 6/14/2021     Pulmonary interstitial fibrosis (H)      Rheumatoid arthritis(714.0)     [facility]     SIADH (syndrome of inappropriate ADH production) (H) 6/14/2021     Spinal stenosis of lumbar region 6/14/2021     Steroid long-term use      Urinary retention 6/14/2021      PAST SURGICAL HISTORY:   has a past surgical history that includes REMOVAL GALLBLADDER (1981); CORRECT BUNION,SIMPLE (2002); VAGINAL HYSTERECTOMY (1981); Arthroplasty knee (11/7/2011); Exchange poly component arthroplasty knee (11/26/2011); Irrigation and debridement knee, combined (11/26/2011); Arthroplasty revision knee (12/29/2011); Irrigation and debridement knee, combined (12/29/2011); Irrigation and debridement knee, combined (2/9/2012); and NONSPECIFIC PROCEDURE (6/22/12).  FAMILY HISTORY: family history includes Eye Disorder in her mother; Family History Negative in her father; Heart Disease in her mother; Hypertension in her mother.  SOCIAL HISTORY:   reports that she has never smoked. She has never used smokeless tobacco. She reports that she does not drink alcohol and does not use drugs.  Patient's living condition: lives with spouse    Post Discharge  Medication Reconciliation Status: discharge medications reconciled and changed, per note/orders  Current Outpatient Medications   Medication Sig     acetaminophen (TYLENOL) 325 MG tablet Take 3 tablets (975 mg) by mouth 3 times daily     acetylcysteine (N-ACETYL CYSTEINE) 600 MG CAPS capsule Take 1 capsule (600 mg) by mouth daily     albuterol (PROAIR HFA/PROVENTIL HFA/VENTOLIN HFA) 108 (90 Base) MCG/ACT inhaler Inhale 2 puffs into the lungs every 4 hours as needed for shortness of breath / dyspnea or wheezing     amLODIPine (NORVASC) 2.5 MG tablet Take 1 tablet (2.5 mg) by mouth daily     aspirin 81 MG tablet Take 1 tablet (81 mg) by mouth daily     atenolol (TENORMIN) 100 MG tablet Take 1 tablet (100 mg) by mouth daily     cholecalciferol (VITAMIN D3) 1000 UNIT tablet Take 1 tablet (1,000 Units) by mouth daily     diclofenac (VOLTAREN) 1 % topical gel Apply topically daily as needed for moderate pain     ferrous sulfate (IRON) 325 (65 Fe) MG tablet Take 1 tablet (325 mg) by mouth daily (with breakfast)     gabapentin (NEURONTIN) 100 MG capsule Take 2 capsules (200 mg) by mouth every morning     gabapentin (NEURONTIN) 800 MG tablet Take 1 tablet (800 mg) by mouth 3 times daily     HEMP OIL OR EXTRACT OR OTHER CBD CANNABINOID, NOT MEDICAL CANNABIS, Take 1 tablet by mouth daily CBD gummy     HYDROmorphone (DILAUDID) 2 MG tablet Take 1 tablet (2 mg) by mouth every 4 hours as needed for moderate to severe pain     hydroxychloroquine (PLAQUENIL) 200 MG tablet Take 1 tablet by mouth 2 times daily.     IBANdronate (BONIVA) 150 MG tablet Take 1 tablet (150 mg) by mouth every 30 days Take 60 minutes before am meal with 8 oz. water. Remain upright for 60 minutes.     ibuprofen (ADVIL/MOTRIN) 200 MG tablet Take 200 mg by mouth 2 times daily     levOCARNitine (CARNITOR) 330 MG tablet Take 990 mg by mouth 2 times daily      Lidocaine (LIDOCARE) 4 % Patch Place 1 patch onto the skin every 24 hours To prevent lidocaine toxicity,  "patient should be patch free for 12 hrs daily.     losartan (COZAAR) 100 MG tablet Take 1 tablet (100 mg) by mouth daily     medical cannabis (Patient's own supply) See Admin Instructions (The purpose of this order is to document that the patient reports taking medical cannabis.  This is not a prescription, and is not used to certify that the patient has a qualifying medical condition.)     Melatonin 1 MG CHEW Take 3 mg by mouth At Bedtime     naloxone (NARCAN) 4 MG/0.1ML nasal spray Spray 4 mg into one nostril alternating nostrils once as needed for opioid reversal     polyethylene glycol (MIRALAX) 17 GM/Dose powder Take 17 g by mouth 2 times daily     predniSONE (DELTASONE) 5 MG tablet Take 5 mg by mouth daily      sodium chloride 1 GM tablet Take 1 tablet (1 g) by mouth 2 times daily (with meals)     tamsulosin (FLOMAX) 0.4 MG capsule Take 1 capsule (0.4 mg) by mouth daily     No current facility-administered medications for this visit.       ROS:  10 point ROS of systems including Constitutional, Eyes, Respiratory, Cardiovascular, Gastroenterology, Genitourinary, Integumentary, Musculoskeletal, Psychiatric were all negative except for pertinent positives noted in my HPI.    Vitals:  /62   Pulse 79   Temp 97.5  F (36.4  C)   Resp 18   Ht 1.499 m (4' 11\")   Wt 75.8 kg (167 lb 1.6 oz)   LMP  (LMP Unknown)   SpO2 95%   BMI 33.75 kg/m    Exam:  GENERAL APPEARANCE:  Alert, in no distress, cooperative  ENT:  oral mucous membranes moist  EYES:  Conjunctiva and lids normal  RESP:  lungs clear to auscultation , no respiratory distress  CV:  RRR  ABDOMEN:  bowel sounds normal, soft, non-tender  M/S:   no edema  SKIN:  large bruise RUE  PSYCH:  oriented X 3, affect and mood normal    Lab/Diagnostic data:    Most Recent 3 CBC's:  Recent Labs   Lab Test 08/22/21  1211 07/22/21  0925 12/28/20  1054   WBC 6.7 6.9 9.2   HGB 12.9 12.7 13.1   MCV 95 95 95    296 394     Most Recent 3 BMP's:  Recent Labs   Lab " Test 08/27/21  0608 08/26/21  0602 08/25/21  0709 08/24/21  0731 08/23/21  0700   * 128* 128* 127* 125*   POTASSIUM  --   --  4.1 5.0 3.8   CHLORIDE  --   --  94 92* 87*   CO2  --   --  36* 31 32   BUN  --   --  9 7 7   CR  --   --  0.42* 0.43* 0.44*   ANIONGAP  --   --  <1* 4 6   KRISTOPHER  --   --  8.4* 8.6 8.4*   GLC  --   --  86 82 78     Most Recent TSH and T4:  Recent Labs   Lab Test 07/22/21  0925 12/18/20  0645   TSH 2.76 5.01*   T4  --  1.05     Ferritin   Date Value Ref Range Status   07/22/2021 465 (H) 10 - 130 ng/mL Final     ASSESSMENT/PLAN:  (M62.81) Generalized muscle weakness  (primary encounter diagnosis)  (W19.XXXD) Fall, subsequent encounter  (T07.XXXA) Multiple bruises  (S32.000S) Lumbar compression fracture, sequela  (M48.061) Spinal stenosis of lumbar region, unspecified whether neurogenic claudication present  (M80.00XS) Osteoporosis with current pathological fracture, unspecified osteoporosis type, sequela  Comment: weakness multifactorial: lumbar CT showed high-grade compression fracture with fragmentation at L3 with progressive loss of height contributing to severe spinal stenosis; currently lift for transfers, non ambulatory; no history of spinal surgery, history of bilat knee surgery   Plan:   --continue Ibandronate Sodium 150 mg monthly for osteoporosis  --continue gabapentin 1,000 mg q AM & 800 mg BID  --continue Tylenol 975 mg TID, ibuprofen 200 mg BID, dilaudid 2 mg q 4 hr prn, lidocaine patch daily - would like to see ibuprofen discontinued  --physical therapy & occupational therapy to eval/treat for strengthening, balance, safety   **Was evaluated by orthospine: According to surgery patient has severe osteoporosis and therefore is not a candidate for any instrumented case.  They are not recommending any operative management to address the compression fracture.  Additionally she is not a candidate for kyphoplasty or vertebroplasty due to the posterior element involvement of the high  risk of cement extravasation which could cause catastrophic neurological injury.    (E22.2) SIADH (syndrome of inappropriate ADH production) (H)  (E87.1) Hyponatremia  Comment: chronic hyponatremia: Na+ levels range from 116-125 since 12/2020; seen by Nephrology 12/2020- elevated urine osmolality suggested SIADH  Plan:   --continue sodium chloride 1 g BID for now, consider lowering dose if levels continue to improve   --BMP scheduled for tomorrow, 8/31  --continue fluid restriction 1,500 mL/day     (R33.9) Urinary retention  Comment: acute; failed voiding trial in hospital   Plan:   --continue Flomax 0.4 mg daily   --continue niño cath for now- voiding trial next week Tuesday (d/t void 8hr after removal of niño catheter, PVR every shift x2 days, straight cath if PVR >350cc, replace niño cath if straight cath x3)     (D64.9) Anemia  Comment: most recent iron level elevated in 400s with normal Hgb  Plan:  --discontinue ferrous sulfate    --follow Hgb     (I10) Essential hypertension  (E87.6) Hypokalemia   Comment: only single BP on file  Plan:   --start daily vitals   --continue amlodipine 2.5 mg daily, atenolol 100 mg daily, losartan 100 mg daily  --discontinue KCl 20 mEq daily   --[facility] tomorrow     (C85.90) Non-Hodgkin's lymphoma, unspecified body region, unspecified non-Hodgkin lymphoma type (H)  Comment: chronic; receives care at [facility], hematology- history of chemo & radiation   Plan:   --schedule virtual follow up appt with [facility] hematology     (M06.9) Rheumatoid arthritis involving multiple sites, unspecified whether rheumatoid factor present (H)  Comment: chronic, diagnosed over 40 yrs ago  Plan:    --continue prednisone 2.5 mg daily, Plaquenil 200 gm BID    Orders:  1. Start daily vitals   2. Discontinue ferrous sulfate   3. Voiding trial next week Tuesday 9/7 (d/t void 8hr after removal of niño catheter, PVR every shift x2 days, straight cath if PVR >350cc, replace niño cath if straight cath  x3)  *orders called into facility     Total time spent with patient visit at the [facility] nursing [facility] was 36 including patient visit and review of past records. Greater than 50% of total time spent with counseling and coordinating care due to pain control, starting therapies, reviewed medications, discussed niño catheter.     Electronically signed by:  Alanna Castañeda NP                     Sincerely,        Alanna Castañeda NP

## 2021-09-01 NOTE — TELEPHONE ENCOUNTER
[facility] Nurse Triage Telephone Note    Provider: Alanna Castañeda NP   Facility: [facility]   Facility Type:  LTC    Caller: Iain  Call Back Number: [phone]    Allergies   Allergen Reactions     Ace Inhibitors Cough     Latex GI Disturbance     lip swelling     Liquid Adhesive Rash     Tape [Adhesive Tape] Rash       Reason for call: Pt currently on 1500cc fluid restriction, Sodium Chloride 1g BID and Potassium Chl 20mEq every day.         Verbal Order/Direction given by Provider: discontinue Potassium Chl.     Provider giving Order:  Alanna Castañeda NP     Verbal Order given to: Jimbo Wyman RN

## 2021-09-20 NOTE — TELEPHONE ENCOUNTER
Fax received from [facility] Care 06/05/21 for review and signature.  Put in Dr. Hernandez's in basket.      '

## 2021-09-23 PROBLEM — D64.9 ANEMIA, UNSPECIFIED TYPE: Status: ACTIVE | Noted: 2021-01-01

## 2021-09-23 NOTE — PROGRESS NOTES
"[facility] GERIATRIC SERVICES    Chief Complaint   Patient presents with     RECHECK     urinary retention     HPI:  Nicolás Wyman is a 81 year old  (1940), who is being seen today for an episodic care visit at: [facility] () [[id number]]. PMH significant for hypertension, rheumatoid arthritis, long-term steroid use, remote history of non-Hodgkin's lymphoma (diagnosed in 2014), interstitial pulmonary fibrosis, SIADH, compression fracture of L3, lumbar spinal stenosis, osteoporosis. Residing in LTC since 08/2021 following hospitalization for weakness & fall.    Today's concern is: patient's main complaint this morning is itching caused by niño catheter leg bag straps. Chronic pain unchanged. She is working with physical therapy this morning. Patient denies SOB, CP, dizziness or headache.    Wt Readings from Last 4 Encounters:   09/17/21 75.8 kg (167 lb 3.2 oz)   08/28/21 75.8 kg (167 lb 1.6 oz)   08/22/21 81.6 kg (180 lb)   06/14/21 78.5 kg (173 lb)       Allergies, and PMH/PSH reviewed in [facility] today.  REVIEW OF SYSTEMS:  10 point ROS of systems including Constitutional, Eyes, Respiratory, Cardiovascular, Gastroenterology, Genitourinary, Integumentary, Musculoskeletal, Psychiatric were all negative except for pertinent positives noted in my HPI.    Objective:   BP (!) 176/86   Pulse 84   Temp 97.2  F (36.2  C)   Resp 18   Ht 1.499 m (4' 11\")   Wt 75.8 kg (167 lb 3.2 oz)   LMP  (LMP Unknown)   SpO2 92%   BMI 33.77 kg/m    GENERAL APPEARANCE:  Alert, in no distress, cooperative  ENT:  oral mucous membranes moist  EYES: conjunctiva and lids normal  RESP:  lungs clear to auscultation , no respiratory distress  CV:  RRR  ABDOMEN:  bowel sounds normal, soft, non-tender  M/S:   no edema  SKIN:  large bruise L knee  PSYCH:  oriented X 3, affect and mood normal      Most Recent 3 CBC's:  Recent Labs   Lab Test 08/22/21  1211 07/22/21  0925 12/28/20  1054   WBC 6.7 6.9 9.2   HGB 12.9 12.7 13.1   MCV 95 95 " 95    296 394     Most Recent 3 BMP's:  Recent Labs   Lab Test 08/27/21  0608 08/26/21  0602 08/25/21  0709 08/24/21  0731 08/24/21  0731 08/23/21  1401 08/23/21  0700   * 128* 128*   < > 127*   < > 125*   POTASSIUM  --   --  4.1  --  5.0  --  3.8   CHLORIDE  --   --  94  --  92*  --  87*   CO2  --   --  36*  --  31  --  32   BUN  --   --  9  --  7  --  7   CR  --   --  0.42*  --  0.43*  --  0.44*   ANIONGAP  --   --  <1*  --  4  --  6   KRISTOPHER  --   --  8.4*  --  8.6  --  8.4*   GLC  --   --  86  --  82  --  78    < > = values in this interval not displayed.       Assessment/Plan:  (M62.81) Generalized muscle weakness  (primary encounter diagnosis)  (S32.000S) Lumbar compression fracture, sequela  (M48.061) Spinal stenosis of lumbar region, unspecified whether neurogenic claudication present  (M80.00XS) Osteoporosis with current pathological fracture, unspecified osteoporosis type, sequela  Comment: weakness multifactorial: lumbar CT showed high-grade compression fracture with fragmentation at L3 with progressive loss of height contributing to severe spinal stenosis; currently lift for transfers with staff but ambulating short distances with physical therapy; no history of spinal surgery, history of bilat knee surgery   Plan:   --continue Ibandronate Sodium 150 mg monthly for osteoporosis  --continue gabapentin 1,000 mg q AM & 800 mg BID  --continue Tylenol 975 mg TID, ibuprofen 200 mg BID, dilaudid 2 mg q 4 hr prn, lidocaine patch daily  --continue physical therapy & occupational therapy for strengthening, balance, safety      (E22.2) SIADH (syndrome of inappropriate ADH production) (H)  (E87.1) Hyponatremia  Comment: chronic hyponatremia: Na+ levels range from 116-125 since 12/2020; seen by Nephrology 12/2020- elevated urine osmolality suggested SIADH  Plan:   --decrease sodium chloride to 1 g daily   --BMP next week   --continue fluid restriction 1,500 mL/day      (R33.9) Urinary retention  Comment:  acute; failed voiding trial x3 despite Flomax  Plan:   --discontinue Flomax   --continue niño cath      (D64.9) Anemia  Comment: most recent iron level elevated in 400s with normal Hgb- ferrous sulfate discontinued  Plan:  --CBC next week      (I10) Essential hypertension  (E87.6) Hypokalemia   Comment: labile BPs; KCl discontinued last month  Plan:   --continue daily vitals   --continue amlodipine 2.5 mg daily, atenolol 100 mg daily, losartan 100 mg daily  --BMP next week     Orders:  1. Decrease sodium chloride to 1 g daily   2. Discontinue Flomax   3. BMP & CBC next week     Electronically signed by: Alanna Castañeda NP

## 2021-09-27 NOTE — TELEPHONE ENCOUNTER
[facility] Nurse Triage Telephone Note    Provider: Alanna Castañeda NP   Facility: [facility] Facility Type:  [facility]    Caller: Nikhil    Allergies:    Allergies   Allergen Reactions     Ace Inhibitors Cough     Latex GI Disturbance     lip swelling     Liquid Adhesive Rash     Tape [Adhesive Tape] Rash        Reason for call: Pt has order for Diclofenac to knees every day PRN, need to know how many grams?    Verbal Order/Direction given by Provider: 2 grams to each knee.    Provider giving Order:  Alanna Castañeda NP     Verbal Order given to: Nikhil Alfaro RN

## 2021-09-30 NOTE — TELEPHONE ENCOUNTER
Fax received from [facility]  Revision to Plan of Care 06/05/21 for review and signature.  Put in Dr. Hernandez's in basket.

## 2021-10-03 NOTE — TELEPHONE ENCOUNTER
[facility] Nurse Triage Telephone Note    Provider: Alanna Castañeda NP   Facility: [facility] Facility Type:  [facility]    Caller: Sal  Call Back Number: [phone]    Allergies:    Allergies   Allergen Reactions     Ace Inhibitors Cough     Latex GI Disturbance     lip swelling     Liquid Adhesive Rash     Tape [Adhesive Tape] Rash        Reason for call: Earlier this am with sponge bath observed slight red rash on R side of back. Now the rash has pustules & clusters of they are wrapping to her R side. She denies pain or itch. She has had shingles before & had [place] vaccine.    Verbal Order/Direction given by Provider: Valtrex 1000mg po q 8 hr x 7 days. Check BMP 10/4.    Provider giving Order:  Zara Garcia MD    Verbal Order given to: Lauren Alfaro RN

## 2021-10-06 NOTE — PROGRESS NOTES
"[facility] GERIATRIC SERVICES    Chief Complaint   Patient presents with     RECHECK     FVP Care Coordination - Health Plan or Product Change     HPI:  Nicolás Wyman is a 81 year old  (1940), who is being seen today for an episodic care visit at: [facility] () [[id number]].     PMH significant for hypertension, rheumatoid arthritis, long-term steroid use, remote history of non-Hodgkin's lymphoma (diagnosed in 2014), interstitial pulmonary fibrosis, SIADH, compression fracture of L3, lumbar spinal stenosis, osteoporosis. Residing in LTC since 08/2021 following hospitalization for weakness & fall.    Today's concern is:   The health plan new enrollment has happened. I have reviewed the  MDS, the preventative needs,  and facility care plan. The level of care is appropriate. I have reviewed the code status/advanced directives.   Patient reports pain poorly controlled this morning: bilat knee pain- patient agreeable to onsite cortisone injections. Currently on Valtrex for shingles flare. Patient has difficult time pinpointing source of pain \"pain all over\" so is unsure if rash is causing her any discomfort. She is not happy about being woken at midnight to take the Valtrex. Patient reports sore/lump in her mouth. She endorses to pain with light palpation to L check. Afebrile. Patient denies SOB, CP or cough.     Allergies, and PMH/PSH reviewed in [facility] today.    REVIEW OF SYSTEMS:  10 point ROS of systems including Constitutional, Eyes, Respiratory, Cardiovascular, Gastroenterology, Genitourinary, Integumentary, Musculoskeletal, Psychiatric were all negative except for pertinent positives noted in my HPI.    Objective:   BP (!) 168/63   Pulse 89   Temp 97.2  F (36.2  C)   Resp 18   Ht 1.499 m (4' 11\")   Wt 75.5 kg (166 lb 6.4 oz)   LMP  (LMP Unknown)   SpO2 92%   BMI 33.61 kg/m    GENERAL APPEARANCE:  Alert, in no distress, cooperative  ENT:  oral mucous membranes moist, large swollen, pustule L " upper/inner cheek near  EYES: conjunctiva and lids normal  RESP:  lungs clear to auscultation , no respiratory distress  CV:  RRR  ABDOMEN:  bowel sounds normal, soft, non-tender  M/S:  no redness to joints, no LE edema  SKIN: R mid back, R side & RLQ: numerous blisters appear to be dried up with surrounding erythema  PSYCH:  oriented X 3, affect and mood normal      Most Recent 3 CBC's:  Recent Labs   Lab Test 08/22/21  1211 07/22/21  0925 12/28/20  1054   WBC 6.7 6.9 9.2   HGB 12.9 12.7 13.1   MCV 95 95 95    296 394     Most Recent 3 BMP's:  Recent Labs   Lab Test 08/27/21  0608 08/26/21  0602 08/25/21  0709 08/24/21  0731 08/24/21  0731 08/23/21  1401 08/23/21  0700   * 128* 128*   < > 127*   < > 125*   POTASSIUM  --   --  4.1  --  5.0  --  3.8   CHLORIDE  --   --  94  --  92*  --  87*   CO2  --   --  36*  --  31  --  32   BUN  --   --  9  --  7  --  7   CR  --   --  0.42*  --  0.43*  --  0.44*   ANIONGAP  --   --  <1*  --  4  --  6   KRISTOPHER  --   --  8.4*  --  8.6  --  8.4*   GLC  --   --  86  --  82  --  78    < > = values in this interval not displayed.     Most Recent TSH and T4:  Recent Labs   Lab Test 07/22/21  0925 12/18/20  0645   TSH 2.76 5.01*   T4  --  1.05       Assessment/Plan:  (B02.7) Disseminated herpes zoster  (primary encounter diagnosis)  Comment: acute- on antiviral; history of shingles, has had vaccination  Plan:   --change timing of Valtrex to TID to avoid awakening patient in middle of night   --complete course Valtrex  --monitor rash    (L08.9) Pustular lesion  Comment: acute- quite large in size, in mouth with tenderness of cheek   Plan:   --clindamycin 300 mg PO TID x 5 days  --monitor for response     (M25.561,  M25.562,  G89.28,  Z96.653) Chronic knee pain 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 of both knee joints  (S32.000S) Lumbar compression fracture, sequela  Comment: lumbar CT= high-grade compression fracture with fragmentation at L3 with progressive loss of height contributing to  severe spinal stenosis; currently lift for transfers; no history of spinal surgery, history of bilat knee surgery   Plan:   --continue Ibandronate Sodium 150 mg monthly for osteoporosis  --continue gabapentin 1,000 mg q AM & 800 mg BID  --continue Tylenol 975 mg TID, ibuprofen 200 mg BID, dilaudid 2 mg q 4 hr prn, lidocaine patch daily  --continue physical therapy & occupational therapy for strengthening, balance, safety     (E22.2) SIADH (syndrome of inappropriate ADH production) (H)  Comment: chronic hyponatremia: Na+ levels range from 116-125 since 12/2020; seen by Nephrology 12/2020- elevated urine osmolality suggested SIADH; decreased sodium tab to daily, levels holding at 132  Plan:   --continue sodium chloride 1 g daily   --BMP, follow   --continue fluid restriction 1,500 mL/day      (R33.9) Urinary retention  Comment: acute; failed voiding trial x4 (most recent last month) despite Flomax  Plan:   --continue niño cath      (I10) Essential hypertension  (E87.6) Hypokalemia   Comment: labile BPs; KCl discontinued last month- repeat K+ level on 10/4 was 3.7  Plan:   --continue daily vitals   --continue amlodipine 2.5 mg daily, atenolol 100 mg daily, losartan 100 mg daily  --BMP, follow     Orders:  1. Clindamycin 300 mg PO TID x 5 days  2. Change Valtrex timing to TID while awake     Electronically signed by: Alanna Castañeda NP

## 2021-10-07 NOTE — LETTER
"    10/7/2021        RE: Nicolás Wyman  [facility]  [address]  [facility] [address]         HEALTH GERIATRIC SERVICES    Chief Complaint   Patient presents with     RECHECK     FVP Care Coordination - Health Plan or Product Change     HPI:  Nicolás Wyman is a 81 year old  (1940), who is being seen today for an episodic care visit at: [facility] () [[id number]].     PMH significant for hypertension, rheumatoid arthritis, long-term steroid use, remote history of non-Hodgkin's lymphoma (diagnosed in 2014), interstitial pulmonary fibrosis, SIADH, compression fracture of L3, lumbar spinal stenosis, osteoporosis. Residing in LTC since 08/2021 following hospitalization for weakness & fall.    Today's concern is:   The health plan new enrollment has happened. I have reviewed the  MDS, the preventative needs,  and facility care plan. The level of care is appropriate. I have reviewed the code status/advanced directives.   Patient reports pain poorly controlled this morning: bilat knee pain- patient agreeable to onsite cortisone injections. Currently on Valtrex for shingles flare. Patient has difficult time pinpointing source of pain \"pain all over\" so is unsure if rash is causing her any discomfort. She is not happy about being woken at midnight to take the Valtrex. Patient reports sore/lump in her mouth. She endorses to pain with light palpation to L check. Afebrile. Patient denies SOB, CP or cough.     Allergies, and PMH/PSH reviewed in [facility] today.    REVIEW OF SYSTEMS:  10 point ROS of systems including Constitutional, Eyes, Respiratory, Cardiovascular, Gastroenterology, Genitourinary, Integumentary, Musculoskeletal, Psychiatric were all negative except for pertinent positives noted in my HPI.    Objective:   BP (!) 168/63   Pulse 89   Temp 97.2  F (36.2  C)   Resp 18   Ht 1.499 m (4' 11\")   Wt 75.5 kg (166 lb 6.4 oz)   LMP  (LMP Unknown)   SpO2 92%   BMI 33.61 kg/m  "   GENERAL APPEARANCE:  Alert, in no distress, cooperative  ENT:  oral mucous membranes moist, large swollen, pustule L upper/inner cheek near  EYES: conjunctiva and lids normal  RESP:  lungs clear to auscultation , no respiratory distress  CV:  RRR  ABDOMEN:  bowel sounds normal, soft, non-tender  M/S:  no redness to joints, no LE edema  SKIN: R mid back, R side & RLQ: numerous blisters appear to be dried up with surrounding erythema  PSYCH:  oriented X 3, affect and mood normal      Most Recent 3 CBC's:  Recent Labs   Lab Test 08/22/21  1211 07/22/21  0925 12/28/20  1054   WBC 6.7 6.9 9.2   HGB 12.9 12.7 13.1   MCV 95 95 95    296 394     Most Recent 3 BMP's:  Recent Labs   Lab Test 08/27/21  0608 08/26/21  0602 08/25/21  0709 08/24/21  0731 08/24/21  0731 08/23/21  1401 08/23/21  0700   * 128* 128*   < > 127*   < > 125*   POTASSIUM  --   --  4.1  --  5.0  --  3.8   CHLORIDE  --   --  94  --  92*  --  87*   CO2  --   --  36*  --  31  --  32   BUN  --   --  9  --  7  --  7   CR  --   --  0.42*  --  0.43*  --  0.44*   ANIONGAP  --   --  <1*  --  4  --  6   KRISTOPHER  --   --  8.4*  --  8.6  --  8.4*   GLC  --   --  86  --  82  --  78    < > = values in this interval not displayed.     Most Recent TSH and T4:  Recent Labs   Lab Test 07/22/21  0925 12/18/20  0645   TSH 2.76 5.01*   T4  --  1.05       Assessment/Plan:  (B02.7) Disseminated herpes zoster  (primary encounter diagnosis)  Comment: acute- on antiviral; history of shingles, has had vaccination  Plan:   --change timing of Valtrex to TID to avoid awakening patient in middle of night   --complete course Valtrex  --monitor rash    (L08.9) Pustular lesion  Comment: acute- quite large in size, in mouth with tenderness of cheek   Plan:   --clindamycin 300 mg PO TID x 5 days  --monitor for response     (M25.561,  M25.562,  G89.28,  Z96.653) Chronic knee pain 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 of both knee joints  (S32.000S) Lumbar compression fracture, sequela  Comment:  lumbar CT= high-grade compression fracture with fragmentation at L3 with progressive loss of height contributing to severe spinal stenosis; currently lift for transfers; no history of spinal surgery, history of bilat knee surgery   Plan:   --continue Ibandronate Sodium 150 mg monthly for osteoporosis  --continue gabapentin 1,000 mg q AM & 800 mg BID  --continue Tylenol 975 mg TID, ibuprofen 200 mg BID, dilaudid 2 mg q 4 hr prn, lidocaine patch daily  --continue physical therapy & occupational therapy for strengthening, balance, safety     (E22.2) SIADH (syndrome of inappropriate ADH production) (H)  Comment: chronic hyponatremia: Na+ levels range from 116-125 since 12/2020; seen by Nephrology 12/2020- elevated urine osmolality suggested SIADH; decreased sodium tab to daily, levels holding at 132  Plan:   --continue sodium chloride 1 g daily   --BMP, follow   --continue fluid restriction 1,500 mL/day      (R33.9) Urinary retention  Comment: acute; failed voiding trial x4 (most recent last month) despite Flomax  Plan:   --continue niño cath      (I10) Essential hypertension  (E87.6) Hypokalemia   Comment: labile BPs; KCl discontinued last month- repeat K+ level on 10/4 was 3.7  Plan:   --continue daily vitals   --continue amlodipine 2.5 mg daily, atenolol 100 mg daily, losartan 100 mg daily  --BMP, follow     Orders:  1. Clindamycin 300 mg PO TID x 5 days  2. Change Valtrex timing to TID while awake     Electronically signed by: Alanna Castañeda NP           Sincerely,        Alanna Castañeda NP

## 2021-10-12 NOTE — TELEPHONE ENCOUNTER
[facility] Nurse Triage Telephone Note    Provider: Alanna Castañeda NP   Facility: [facility] Facility Type:  [facility]    Caller: Nikhil  Call Back Number: [phone]    Allergies:    Allergies   Allergen Reactions     Ace Inhibitors Cough     Latex GI Disturbance     lip swelling     Liquid Adhesive Rash     Tape [Adhesive Tape] Rash        Reason for call: Pt states she is having to many BM's & requests her Miralax dose which is BID be decreased./    Verbal Order/Direction given by Provider: OK change to every day & every day PRN    Provider giving Order:  Alanna Castañeda NP     Verbal Order given to: Nikhil Alfaro RN

## 2021-10-28 NOTE — TELEPHONE ENCOUNTER
[facility] Nurse Triage Telephone Note    Provider: Alanna Castañeda NP   Facility: [facility]   Facility Type:  [facility]    Caller: Elisa  Call Back Number: [phone]    Allergies   Allergen Reactions     Ace Inhibitors Cough     Latex GI Disturbance     lip swelling     Liquid Adhesive Rash     Tape [Adhesive Tape] Rash       Reason for call: Pt's family is requesting an order for Claritin to be administered. Pt has been exhibiting symptoms of runny nose, scratchy throat and at times SOB. Family reports that in the past while pt resided at home they would administer this medication and it was effective. Family also requests that Melatonin be increased and that the current dose is not effective. Current dose is Melatonin 3mg every day at bedtime.    Verbal Order/Direction given by Provider: Claritin 10mg every day.   Increase Melatonin to 5mg every day at bedtime    Provider giving Order:  Zara Garcia MD    Verbal Order given to: Elisa Wyman RN

## 2021-10-29 NOTE — PROGRESS NOTES
[facility] GERIATRIC SERVICES  PRIMARY CARE PROVIDER AND CLINIC:  [facility]ocasta Hernandez MD, [address] / [facility] [address]      Pt was seen by Dr Morris on 10/28/21 at the [facility] for an initial LTC visit    Pt  is a 81 year old  (1940), admitted to the above facility from  [facility]. Hospital stay 8/22/21 through 8/27/21.. Hospital stay was for  worsening weakness.  Weakness thought multifactorial 2/2 RA, compression fractures d/t long-term steroid use, lumbar spinal stenosis & osteoporosis. Course complicated by hyponatremia felt secondary to SIADH, constipation and urinary retention.    PMH significant for hypertension, rheumatoid arthritis, long-term steroid use, remote history of non-Hodgkin's lymphoma (diagnosed in 2014), interstitial pulmonary fibrosis, SIADH, compression fracture of L3, lumbar spinal stenosis, osteoporosis.      Pt states she is chronically non-ambulatory secondary to B LE weakness, low back and B knee pain.  She continues to have B knee pain. She remains on Ibuprofen, dilaudid, lidocaine patch, gabapentin  Course since SNF admission complicated by Zoster R low back    She denies cough, chest pain, SOB, nausea or vomiting  Crocker remains in place        CODE STATUS/ADVANCE DIRECTIVES DISCUSSION:  Full Code  CPR/Full code   ALLERGIES:   Allergies   Allergen Reactions     Ace Inhibitors Cough     Latex GI Disturbance     lip swelling     Liquid Adhesive Rash     Tape [Adhesive Tape] Rash      PAST MEDICAL HISTORY:   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    removal right TKA 6/12     Lumbar compression fracture, sequela  6/14/2021     Non Hodgkin's lymphoma (H) 6/14    chemo x4, clearing by PET     Osteoporosis 6/14/2021     Other specified glaucoma 6/14/2021     Pulmonary interstitial fibrosis (H)      Rheumatoid arthritis(714.0)     [facility]     SIADH (syndrome of inappropriate ADH production) (H) 6/14/2021     Spinal stenosis of lumbar region 6/14/2021     Steroid long-term use      Urinary retention 6/14/2021      PAST SURGICAL HISTORY:   has a past surgical history that includes REMOVAL GALLBLADDER (1981); CORRECT BUNION,SIMPLE (2002); VAGINAL HYSTERECTOMY (1981); Arthroplasty knee (11/7/2011); Exchange poly component arthroplasty knee (11/26/2011); Irrigation and debridement knee, combined (11/26/2011); Arthroplasty revision knee (12/29/2011); Irrigation and debridement knee, combined (12/29/2011); Irrigation and debridement knee, combined (2/9/2012); and NONSPECIFIC PROCEDURE (6/22/12).  FAMILY HISTORY: family history includes Eye Disorder in her mother; Family History Negative in her father; Heart Disease in her mother; Hypertension in her mother.  SOCIAL HISTORY:   reports that she has never smoked. She has never used smokeless tobacco. She reports that she does not drink alcohol and does not use drugs.  Patient's living condition: lives with spouse        Current Outpatient Medications   Medication Sig     acetaminophen (TYLENOL) 325 MG tablet Take 3 tablets (975 mg) by mouth 3 times daily     acetylcysteine (N-ACETYL CYSTEINE) 600 MG CAPS capsule Take 1 capsule (600 mg) by mouth daily     albuterol (PROAIR HFA/PROVENTIL HFA/VENTOLIN HFA) 108 (90 Base) MCG/ACT inhaler Inhale 2 puffs into the lungs every 4 hours as needed for shortness of breath / dyspnea or wheezing     amLODIPine (NORVASC) 2.5 MG tablet Take 1 tablet (2.5 mg) by mouth daily     aspirin 81 MG tablet Take 1 tablet (81 mg) by mouth daily     atenolol (TENORMIN) 100 MG tablet Take 1 tablet (100 mg) by mouth daily     cholecalciferol (VITAMIN D3) 1000 UNIT  tablet Take 1 tablet (1,000 Units) by mouth daily     diclofenac (VOLTAREN) 1 % topical gel Apply 2 g topically daily as needed for moderate pain     gabapentin (NEURONTIN) 100 MG capsule Take 2 capsules (200 mg) by mouth every morning     gabapentin (NEURONTIN) 800 MG tablet Take 1 tablet (800 mg) by mouth 3 times daily     HEMP OIL OR EXTRACT OR OTHER CBD CANNABINOID, NOT MEDICAL CANNABIS, Take 1 tablet by mouth daily CBD gummy     HYDROmorphone (DILAUDID) 2 MG tablet Take 1 tablet (2 mg) by mouth every 4 hours as needed for moderate to severe pain     hydroxychloroquine (PLAQUENIL) 200 MG tablet Take 1 tablet by mouth 2 times daily.     IBANdronate (BONIVA) 150 MG tablet Take 1 tablet (150 mg) by mouth every 30 days Take 60 minutes before am meal with 8 oz. water. Remain upright for 60 minutes.     ibuprofen (ADVIL/MOTRIN) 200 MG tablet Take 200 mg by mouth 2 times daily     levOCARNitine (CARNITOR) 330 MG tablet Take 990 mg by mouth 2 times daily      Lidocaine (LIDOCARE) 4 % Patch Place 1 patch onto the skin every 24 hours To prevent lidocaine toxicity, patient should be patch free for 12 hrs daily.     loratadine (CLARITIN) 10 MG tablet Take 10 mg by mouth daily     losartan (COZAAR) 100 MG tablet Take 1 tablet (100 mg) by mouth daily     medical cannabis (Patient's own supply) See Admin Instructions (The purpose of this order is to document that the patient reports taking medical cannabis.  This is not a prescription, and is not used to certify that the patient has a qualifying medical condition.)     Melatonin 5 MG CHEW Take 5 mg by mouth At Bedtime     naloxone (NARCAN) 4 MG/0.1ML nasal spray Spray 4 mg into one nostril alternating nostrils once as needed for opioid reversal     polyethylene glycol (MIRALAX) 17 GM/Dose powder Take 17 g by mouth 2 times daily     predniSONE (DELTASONE) 5 MG tablet Take 5 mg by mouth daily      sodium chloride 1 GM tablet Take 1 tablet (1 g) by mouth daily     No current  facility-administered medications for this visit.       ROS:  10 point ROS of systems including Constitutional, Eyes, Respiratory, Cardiovascular, Gastroenterology, Genitourinary, Integumentary, Musculoskeletal, Psychiatric were all negative except for pertinent positives noted in my HPI.      Exam:  GENERAL APPEARANCE:  Alert, in no distress, sitting in recliner in room, c/o B knee pain (is due for next Dilaudid)  ENT:  oral mucous membranes moist  EYES:  Conjunctiva and lids normal  RESP:  lungs clear to auscultation   CV:  RRR  ABDOMEN: soft, non-tender  SKIN no rash  M/S:   no edema  Generalized LE weakness  No evidence of acute arthritis involving knees    Lab/Diagnostic data:    Most Recent 3 CBC's:  Recent Labs   Lab Test 08/22/21  1211 07/22/21  0925 12/28/20  1054   WBC 6.7 6.9 9.2   HGB 12.9 12.7 13.1   MCV 95 95 95    296 394     Most Recent 3 BMP's:  Recent Labs   Lab Test 08/27/21  0608 08/26/21  0602 08/25/21  0709 08/24/21  0731 08/24/21  0731 08/23/21  1401 08/23/21  0700   * 128* 128*   < > 127*   < > 125*   POTASSIUM  --   --  4.1  --  5.0  --  3.8   CHLORIDE  --   --  94  --  92*  --  87*   CO2  --   --  36*  --  31  --  32   BUN  --   --  9  --  7  --  7   CR  --   --  0.42*  --  0.43*  --  0.44*   ANIONGAP  --   --  <1*  --  4  --  6   KRISTOPHER  --   --  8.4*  --  8.6  --  8.4*   GLC  --   --  86  --  82  --  78    < > = values in this interval not displayed.     Most Recent TSH and T4:  Recent Labs   Lab Test 07/22/21  0925 12/18/20  0645   TSH 2.76 5.01*   T4  --  1.05     Ferritin   Date Value Ref Range Status   07/22/2021 465 (H) 10 - 130 ng/mL Final     ASSESSMENT/PLAN:    (M62.81) Generalized muscle weakness  (primary encounter diagnosis)  (W19.XXXD) Fall, subsequent encounter  (S32.000S) Lumbar compression fracture, sequela  (M48.061) Spinal stenosis of lumbar region, unspecified whether neurogenic claudication present  (M80.00XS) Osteoporosis with current pathological fracture,  unspecified osteoporosis type, sequela  OA knees  Chronic pain secondary to above,  Chronic immobility secondary to pain  Plan continue current medications, consider ortho PA eval for knee pain  Ambulate if poosible        (E22.2) SIADH (syndrome of inappropriate ADH production) (H)  (E87.1) Hyponatremia  Comment: chronic hyponatremia: Na+ levels range from 116-125 since 12/2020; seen by Nephrology 12/2020- elevated urine osmolality suggested SIADH  Stable  Plan: continue FR.  Unclear benefit from low dose sodium chloride tabs  If Ibuprofen not clearly helpful, would discontinue      (R33.9) Urinary retention  Comment: acute; failed voiding trial in hospital and in TCU  Plan: Crocker cath    (D64.9) Anemia  Comment: resolved    (I10) Essential hypertension  BP intermittently elevated  Plan continue amlodipine, atenolol, losartan, monitor      Zoster   Low back, buttocks  Resolved  Plan offer vaccination    (C85.90) Non-Hodgkin's lymphoma, unspecified body region, unspecified non-Hodgkin lymphoma type (H)  Comment: chronic; receives care at [facility], hematology- history of chemo & radiation   Plan: onc f/u    (M06.9) Rheumatoid arthritis involving multiple sites, unspecified whether rheumatoid factor present (H)  Comment: chronic, diagnosed over 40 yrs ago  Plan:  Continue prednisone, plaquenil, Ibandronate      Praful Morris MD

## 2021-11-01 NOTE — PROGRESS NOTES
"[facility] GERIATRIC SERVICES  Chief Complaint   Patient presents with     [facility] Medical Record Number:  [id number]  Place of Service where encounter took place:  [facility] ([place]) [[id number]]    HPI:    Nicolás Wyman  is 81 year old (1940), who is being seen today for a federally mandated E/M visit. PMH significant for hypertension, rheumatoid arthritis, long-term steroid use, remote history of non-Hodgkin's lymphoma (diagnosed in 2014), interstitial pulmonary fibrosis, SIADH, compression fracture of L3, lumbar spinal stenosis, osteoporosis. Residing in LT since 08/2021 following hospitalization for weakness & fall.     Today's concerns are:  Patient reports chronic pain unchanged. She does feel the prn dilaudid helps keep the pain tolerable. Patient denies n/v/d/c. She reports increase in SOB, mainly with position change or activity. Patient denies CP, cough, SOB at rest or waking with SOB. O2 stable on RA. Afebrile.   No new issues reported by facility staff.   Please see assessment and plan below for medical concerns addressed today's visit.      Allergies, and PMH/PSH reviewed in EPIC today.  REVIEW OF SYSTEMS:  4 point ROS including Respiratory, CV, GI and , other than that noted in the HPI,  is negative    Objective:   /70   Pulse 94   Temp 96.8  F (36  C)   Resp 17   Ht 1.499 m (4' 11\")   Wt 73 kg (161 lb)   LMP  (LMP Unknown)   SpO2 93%   BMI 32.52 kg/m     GENERAL APPEARANCE:  Alert, in no distress, cooperative  ENT:  oral mucous membranes moist, hearing intact to conversation   EYES: conjunctiva and lids normal  RESP:  lungs clear to auscultation, no respiratory distress  CV:  RRR  ABDOMEN:  bowel sounds normal, soft, non-tender  M/S:  decreased muscle tone, no LE edema  PSYCH:  oriented X 3, affect and mood normal    Recent labs in EPIC reviewed by me today.     Assessment/Plan:  (J84.10) Pulmonary interstitial fibrosis (H)  (primary encounter " diagnosis)  Comment: chronic, increase use of inhaler the last couple weeks with increased dyspnea   Plan:   --CXR AP/L today     (I10) Essential hypertension  (E87.6) Hypokalemia   Comment: labile BPs, -162; KCl discontinued in Sept with normal repeat K+ level   Plan:   --recheck BMP tomorrow  --continue daily vitals   --continue amlodipine 2.5 mg daily, atenolol 100 mg daily, losartan 100 mg daily    (E22.2) SIADH (syndrome of inappropriate ADH production) (H)  Comment: chronic hyponatremia: Na+ levels range from 116-125 since 12/2020 however has now been holding low-130s; seen by Nephrology 12/2020- elevated urine osmolality suggested SIADH  Plan:   --continue sodium chloride 1 g daily   --recheck BMP tomorrow   --continue fluid restriction 1,500 mL/day      (D64.9) Anemia  Comment: most recent iron level elevated in 400s with normal Hgb, no longer on Fe  Plan:  --recheck CBC tomorrow     Orders:  1. CXR AP/L today    2. BMP & CBC tomorrow    Electronically signed by: Alanna Castañeda NP

## 2021-11-01 NOTE — LETTER
"    11/1/2021        RE: Nicolás Wyman  [facility]  [address]  [place] [facility] [address]        [facility] GERIATRIC SERVICES  Chief Complaint   Patient presents with     [facility] Medical Record Number:  [id number]  Place of Service where encounter took place:  [facility] () [[id number]]    HPI:    Nicolás Wyman  is 81 year old (1940), who is being seen today for a federally mandated E/M visit. PMH significant for hypertension, rheumatoid arthritis, long-term steroid use, remote history of non-Hodgkin's lymphoma (diagnosed in 2014), interstitial pulmonary fibrosis, SIADH, compression fracture of L3, lumbar spinal stenosis, osteoporosis. Residing in LTC since 08/2021 following hospitalization for weakness & fall.     Today's concerns are:  Patient reports chronic pain unchanged. She does feel the prn dilaudid helps keep the pain tolerable. Patient denies n/v/d/c. She reports increase in SOB, mainly with position change or activity. Patient denies CP, cough, SOB at rest or waking with SOB. O2 stable on RA. Afebrile.   No new issues reported by facility staff.   Please see assessment and plan below for medical concerns addressed today's visit.      Allergies, and PMH/PSH reviewed in EPIC today.  REVIEW OF SYSTEMS:  4 point ROS including Respiratory, CV, GI and , other than that noted in the HPI,  is negative    Objective:   /70   Pulse 94   Temp 96.8  F (36  C)   Resp 17   Ht 1.499 m (4' 11\")   Wt 73 kg (161 lb)   LMP  (LMP Unknown)   SpO2 93%   BMI 32.52 kg/m     GENERAL APPEARANCE:  Alert, in no distress, cooperative  ENT:  oral mucous membranes moist, hearing intact to conversation   EYES: conjunctiva and lids normal  RESP:  lungs clear to auscultation, no respiratory distress  CV:  RRR  ABDOMEN:  bowel sounds normal, soft, non-tender  M/S:  decreased muscle tone, no LE edema  PSYCH:  oriented X 3, affect and mood normal    Recent labs in " EPIC reviewed by me today.     Assessment/Plan:  (J84.10) Pulmonary interstitial fibrosis (H)  (primary encounter diagnosis)  Comment: chronic, increase use of inhaler the last couple weeks with increased dyspnea   Plan:   --CXR AP/L today     (I10) Essential hypertension  (E87.6) Hypokalemia   Comment: labile BPs, -162; KCl discontinued in Sept with normal repeat K+ level   Plan:   --recheck BMP tomorrow  --continue daily vitals   --continue amlodipine 2.5 mg daily, atenolol 100 mg daily, losartan 100 mg daily    (E22.2) SIADH (syndrome of inappropriate ADH production) (H)  Comment: chronic hyponatremia: Na+ levels range from 116-125 since 12/2020 however has now been holding low-130s; seen by Nephrology 12/2020- elevated urine osmolality suggested SIADH  Plan:   --continue sodium chloride 1 g daily   --recheck BMP tomorrow   --continue fluid restriction 1,500 mL/day      (D64.9) Anemia  Comment: most recent iron level elevated in 400s with normal Hgb, no longer on Fe  Plan:  --recheck CBC tomorrow     Orders:  1. CXR AP/L today    2. BMP & CBC tomorrow    Electronically signed by: Alanna Castañeda NP           Sincerely,        Alanna Castañeda NP

## 2021-11-02 NOTE — TELEPHONE ENCOUNTER
[facility] Geriatrics Lab Note     Provider: Alanna Castañeda NP   Facility: [facility] Facility Type:  LTC    Allergies   Allergen Reactions     Ace Inhibitors Cough     Latex GI Disturbance     lip swelling     Liquid Adhesive Rash     Tape [Adhesive Tape] Rash       Labs Reviewed by provider: Chest x-ray results.    Verbal Order/Direction given by Provider: No new orders.    Provider giving Order:  Alanna Castañeda NP     Verbal Order given to: Nikhil Bonilla RN

## 2021-11-02 NOTE — TELEPHONE ENCOUNTER
[facility] Geriatrics Lab Note     Provider: Alanna Castañeda NP   Facility: [facility] Facility Type:  LTC    Allergies   Allergen Reactions     Ace Inhibitors Cough     Latex GI Disturbance     lip swelling     Liquid Adhesive Rash     Tape [Adhesive Tape] Rash       Labs Reviewed by provider: CBC and BMP.  Anemia and Chronic hyponatremia; currently on Sodium Chloride 1g every day and fluid restriction 1500mL/day.         Verbal Order/Direction given by Provider: NNO    Provider giving Order:  Alanna Castañeda NP     Verbal Order given to: Carole Wyman RN

## 2021-11-23 NOTE — TELEPHONE ENCOUNTER
[facility] Nurse Triage Telephone Note    Provider: Alanna Castañeda NP   Facility: [facility] Facility Type:  [facility]    Caller: Nikhil  Call Back Number:     Allergies:    Allergies   Allergen Reactions     Ace Inhibitors Cough     Latex GI Disturbance     lip swelling     Liquid Adhesive Rash     Tape [Adhesive Tape] Rash        Reason for call: Nurse called to report that patient has been asking to use the DuoNeb TID even though it is PRN.  Order ws scheduled till 11/19 and then it went to TID PRN.  No increase in SOB noted by staff.  Nurse thinks patient is using it TID cause that is what it had been previously.      Verbal Order/Direction given by Provider: Okay to change DuoNeb from TID PRN to scheduled.      Provider giving Order:  Alanna Castañeda NP     Verbal Order given to: Nikhil Bonilla RN

## 2021-12-02 PROBLEM — R09.02 HYPOXIA: Status: ACTIVE | Noted: 2021-01-01

## 2021-12-02 NOTE — TELEPHONE ENCOUNTER
[facility] Geriatrics Lab Note     Provider: Alanna Castañeda NP   Facility: [facility] Facility Type:  LTC    Allergies   Allergen Reactions     Ace Inhibitors Cough     Latex GI Disturbance     lip swelling     Liquid Adhesive Rash     Tape [Adhesive Tape] Rash       Labs Reviewed by provider: Chest xray result today 12/2/21 for hypoxia, cough        Verbal Order/Direction given by Provider: NNO, updated NP of result    Provider giving Order:  Alanna Castañeda NP     Verbal Order given to: Nikhil Wyman RN

## 2021-12-02 NOTE — PROGRESS NOTES
[facility] GERIATRIC SERVICES  Chief Complaint   Patient presents with     [employer] Regulatory     RA     [facility] Medical Record Number:  [id number]  Place of Service where encounter took place:  No question data found.    HPI:    Nicolás Wyman  is 81 year old (1940), who is being seen today for a federally mandated E/M visit. PMH significant for hypertension, rheumatoid arthritis, long-term steroid use, remote history of non-Hodgkin's lymphoma (diagnosed in 2014), interstitial pulmonary fibrosis, SIADH, compression fracture of L3, lumbar spinal stenosis, osteoporosis. Residing in LTC since 08/2021 following hospitalization for weakness & fall.     Today's concerns are:  Patient last seen 11/1 at which time she was reporting an increase in SOB. Duoneb scheduled TID for 14 days then switched to prn, has since been rescheduled at TID. Appears Claritin was started 10/28 for symptoms of runny nose, scratchy throat. Today patient reports ongoing SOB with activity per baseline & increase in cough. Patient denies CP or fever.  11/30 facility nurse progress note:   Resident was yelling out this morning that she can't breath. The writer check O2sats, it was 84% before administered scheduled nebulizer, even after the neb O2 was still below her baseline. The writer administered O2 concentrator house standing, notify the Nurse Manager in charge and NP will come visit sometimes tomorrow. after a few hours on the concentrator oxygen, O2 sats at 93%. The writer then tried to recheck resident Oxygen without the concentrator and stayed at 92%. will continue to monitor any SOB. The resident daughter asked the writer if her mom can have a prednisone PRN if her RA flares and the writer response was to update the NP in regards of the matter and let her know.  Wt Readings from Last 4 Encounters:   11/26/21 74.2 kg (163 lb 9.6 oz)   10/29/21 73 kg (161 lb)   10/01/21 75.5 kg (166 lb 6.4 oz)   09/17/21 75.8 kg (167 lb 3.2 oz)        ALLERGIES:Ace inhibitors, Latex, Liquid adhesive, and Tape [adhesive tape]  PAST MEDICAL HISTORY:   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    removal right TKA 6/12     Lumbar compression fracture, sequela 6/14/2021     Non Hodgkin's lymphoma (H) 6/14    chemo x4, clearing by PET     Osteoporosis 6/14/2021     Other specified glaucoma 6/14/2021     Pulmonary interstitial fibrosis (H)      Rheumatoid arthritis(714.0)     [facility]     SIADH (syndrome of inappropriate ADH production) (H) 6/14/2021     Spinal stenosis of lumbar region 6/14/2021     Steroid long-term use      Urinary retention 6/14/2021     PAST SURGICAL HISTORY:   has a past surgical history that includes REMOVAL GALLBLADDER (1981); CORRECT BUNION,SIMPLE (2002); VAGINAL HYSTERECTOMY (1981); Arthroplasty knee (11/7/2011); Exchange poly component arthroplasty knee (11/26/2011); Irrigation and debridement knee, combined (11/26/2011); Arthroplasty revision knee (12/29/2011); Irrigation and debridement knee, combined (12/29/2011); Irrigation and debridement knee, combined (2/9/2012); and NONSPECIFIC PROCEDURE (6/22/12).  FAMILY HISTORY: family history includes Eye Disorder in her mother; Family History Negative in her father; Heart Disease in her mother; Hypertension in her mother.  SOCIAL HISTORY:  reports that she has never smoked. She has never used smokeless tobacco. She reports that she does not drink alcohol and does not use drugs.    MEDICATIONS:     Review of your medicines          Accurate as of December 2, 2021 11:59 PM. If you have any questions, ask your nurse or doctor.            CONTINUE these  medicines which have NOT CHANGED      Dose / Directions   acetaminophen 325 MG tablet  Commonly known as: TYLENOL  Used for: Closed compression fracture of L3 lumbar vertebra, initial encounter (H), Bilateral low back pain without sciatica, unspecified chronicity      Dose: 975 mg  Take 3 tablets (975 mg) by mouth 3 times daily  Refills: 0     acetylcysteine 600 MG Caps capsule  Commonly known as: N-ACETYL CYSTEINE      Dose: 600 mg  Take 1 capsule (600 mg) by mouth daily  Refills: 0     albuterol 108 (90 Base) MCG/ACT inhaler  Commonly known as: PROAIR HFA/PROVENTIL HFA/VENTOLIN HFA      Dose: 2 puff  Inhale 2 puffs into the lungs every 4 hours as needed for shortness of breath / dyspnea or wheezing  Refills: 0     amLODIPine 2.5 MG tablet  Commonly known as: NORVASC  Used for: Spinal stenosis of lumbar region without neurogenic claudication, Essential hypertension, benign      Dose: 2.5 mg  Take 1 tablet (2.5 mg) by mouth daily  Refills: 0     aspirin 81 MG tablet  Commonly known as: ASA      Dose: 81 mg  Take 1 tablet (81 mg) by mouth daily  Quantity: 30 tablet  Refills: 0     atenolol 100 MG tablet  Commonly known as: TENORMIN  Used for: Essential hypertension, benign      Dose: 100 mg  Take 1 tablet (100 mg) by mouth daily  Quantity: 90 tablet  Refills: 3     diclofenac 1 % topical gel  Commonly known as: VOLTAREN      Dose: 2 g  Apply 2 g topically daily as needed for moderate pain  Refills: 0     * gabapentin 800 MG tablet  Commonly known as: NEURONTIN  Used for: Lumbar compression fracture, sequela, Spinal stenosis of lumbar region, unspecified whether neurogenic claudication present      Dose: 800 mg  Take 1 tablet (800 mg) by mouth 3 times daily  Quantity:    Refills: 0     * gabapentin 100 MG capsule  Commonly known as: NEURONTIN  Used for: Lumbar compression fracture, sequela, Spinal stenosis of lumbar region, unspecified whether neurogenic claudication present      Dose: 200 mg  Take 2 capsules (200 mg)  by mouth every morning  Refills: 0     HEMP OIL OR EXTRACT OR OTHER CBD CANNABINOID (NOT MEDICAL CANNABIS)      Dose: 1 tablet  Take 1 tablet by mouth daily CBD gummy  Refills: 0     HYDROmorphone 2 MG tablet  Commonly known as: DILAUDID  Used for: Spinal stenosis of lumbar region without neurogenic claudication      Dose: 2 mg  Take 1 tablet (2 mg) by mouth every 4 hours as needed for moderate to severe pain  Quantity: 180 tablet  Refills: 0     IBANdronate 150 MG tablet  Commonly known as: Boniva  Used for: Osteopenia, unspecified location      Dose: 150 mg  Take 1 tablet (150 mg) by mouth every 30 days Take 60 minutes before am meal with 8 oz. water. Remain upright for 60 minutes.  Quantity: 3 tablet  Refills: 3     ibuprofen 200 MG tablet  Commonly known as: ADVIL/MOTRIN      Dose: 200 mg  Take 200 mg by mouth 2 times daily  Refills: 0     levOCARNitine 330 MG tablet  Commonly known as: CARNITOR      Dose: 990 mg  Take 990 mg by mouth 2 times daily  Refills: 0     Lidocaine 4 % Patch  Commonly known as: LIDOCARE  Used for: Closed compression fracture of L3 lumbar vertebra, initial encounter (H), Bilateral low back pain without sciatica, unspecified chronicity      Dose: 1 patch  Place 1 patch onto the skin every 24 hours To prevent lidocaine toxicity, patient should be patch free for 12 hrs daily.  Quantity: 10 patch  Refills: 0     loratadine 10 MG tablet  Commonly known as: CLARITIN      Dose: 10 mg  Take 10 mg by mouth daily  Refills: 0     losartan 100 MG tablet  Commonly known as: COZAAR  Used for: Essential hypertension, benign      Dose: 100 mg  Take 1 tablet (100 mg) by mouth daily  Quantity: 90 tablet  Refills: 3     medical cannabis  (Patient's own supply)      See Admin Instructions (The purpose of this order is to document that the patient reports taking medical cannabis.  This is not a prescription, and is not used to certify that the patient has a qualifying medical condition.)  Refills: 0    "  Melatonin 5 MG Chew      Dose: 5 mg  Take 5 mg by mouth At Bedtime  Refills: 0     naloxone 4 MG/0.1ML nasal spray  Commonly known as: NARCAN      Dose: 4 mg  Spray 4 mg into one nostril alternating nostrils once as needed for opioid reversal  Refills: 0     Plaquenil 200 MG tablet  Generic drug: hydroxychloroquine      Dose: 200 mg  Take 1 tablet by mouth 2 times daily.  Quantity: 60 tablet  Refills: 1     polyethylene glycol 17 GM/Dose powder  Commonly known as: MIRALAX  Used for: Slow transit constipation      Dose: 17 g  Take 17 g by mouth 2 times daily  Quantity: 510 g  Refills: 0     predniSONE 5 MG tablet  Commonly known as: DELTASONE      Dose: 5 mg  Take 5 mg by mouth daily  Refills: 0     sodium chloride 1 GM tablet  Used for: SIADH (syndrome of inappropriate ADH production) (H)      Dose: 1 g  Take 1 tablet (1 g) by mouth daily  Refills: 0     vitamin D3 1000 units (25 mcg) tablet  Commonly known as: CHOLECALCIFEROL      Dose: 1,000 Units  Take 1 tablet (1,000 Units) by mouth daily  Quantity: 100 tablet  Refills: 3         * This list has 2 medication(s) that are the same as other medications prescribed for you. Read the directions carefully, and ask your doctor or other care provider to review them with you.               Case Management:  I have reviewed the care plan and MDS and do agree with the plan. Patient's desire to return to the community is not present. Information reviewed:  Medications, vital signs, orders, and nursing notes.    ROS:  10 point ROS of systems including Constitutional, Eyes, Respiratory, Cardiovascular, Gastroenterology, Genitourinary, Integumentary, Musculoskeletal, Psychiatric were all negative except for pertinent positives noted in my HPI.    Vitals:  /69   Pulse 96   Temp 97.1  F (36.2  C)   Resp 18   Ht 1.499 m (4' 11\")   Wt 74.2 kg (163 lb 9.6 oz)   LMP  (LMP Unknown)   SpO2 94%   BMI 33.04 kg/m    Body mass index is 33.04 kg/m .  Exam:  GENERAL " APPEARANCE:  Alert, in no distress, cooperative  ENT:  oral mucous membranes moist, hearing intact to conversation   EYES: conjunctiva and lids normal  RESP: crackles to LLL otherwise lungs clear to auscultation, no respiratory distres, no cough during visit  CV:  RRR  ABDOMEN:  bowel sounds normal, soft, non-tender  M/S:  decreased muscle tone, no LE edema  PSYCH:  oriented X 3, affect and mood normal    Lab/Diagnostic data:   Recent labs in [facility] reviewed by me today.     ASSESSMENT/PLAN  (J84.10) Pulmonary interstitial fibrosis (H)  (primary encounter diagnosis)  (R09.02) Hypoxia  Comment: acute on chronic- has been utilizing supplemental O2 for the last 3 days  Plan:   --repeat CXR AP/L today   --continue duoneb TID & daily prn   --discontinue albuterol inhaler     (G47.33) JIGNESH (obstructive sleep apnea)  Comment: daughter recently brought in CPAP machine; education provided to patient on importance of regular use  Plan:   --CPAP nightly   --check O2 levels q shift & in the middle of night while on RA for 1 week- update NP with results    (M06.9) Rheumatoid arthritis involving multiple sites, unspecified whether rheumatoid factor present (H)  Comment: chronic, daughter requesting prn prednisone   Plan:   --continue without medication change, will offer prednisone for RA flares  --schedule follow up appt with Rheumatologist       (I10) Essential hypertension  (E87.6) Hypokalemia   Comment: labile BPs, SBP 130s-150s with HR 80-90s; KCl discontinued in Sept with normal repeat K+ level on 11/3  Plan:   --follow BMP  --continue daily vitals   --continue amlodipine 2.5 mg daily, atenolol 100 mg daily, losartan 100 mg daily     (E22.2) SIADH (syndrome of inappropriate ADH production) (H)  Comment: chronic hyponatremia: Na+ levels range from 116-125 since 12/2020 however has now been holding low-130s- most recent 131; seen by Nephrology 12/2020- elevated urine osmolality suggested SIADH  Plan:   --continue sodium  chloride 1 g daily   --follow BMP  --continue fluid restriction 1,500 mL/day      (D64.9) Anemia  Comment: most recent iron level elevated in 400s, Hgb 13.7 on 11/2; no longer on Fe  Plan:  --follow BMP    Orders:   1. Schedule follow up with Rheumatologist   2. Repeat CXR AP/L today  3. Wean off O2, check O2 levels q shift & in the middle of night while on RA for 1 week- update NP with results      Electronically signed by:  Alanna Castañeda NP

## 2021-12-02 NOTE — LETTER
12/2/2021        RE: Nicolás Wyman  [facility]  [address] [address]  [facility] [address]         HEALTH GERIATRIC SERVICES  Chief Complaint   Patient presents with     longterm Regulatory     RA     [facility] Medical Record Number:  [id number]  Place of Service where encounter took place:  No question data found.    HPI:    Nicolás Wyman  is 81 year old (1940), who is being seen today for a federally mandated E/M visit. PMH significant for hypertension, rheumatoid arthritis, long-term steroid use, remote history of non-Hodgkin's lymphoma (diagnosed in 2014), interstitial pulmonary fibrosis, SIADH, compression fracture of L3, lumbar spinal stenosis, osteoporosis. Residing in LTC since 08/2021 following hospitalization for weakness & fall.     Today's concerns are:  Patient last seen 11/1 at which time she was reporting an increase in SOB. Duoneb scheduled TID for 14 days then switched to prn, has since been rescheduled at TID. Appears Claritin was started 10/28 for symptoms of runny nose, scratchy throat. Today patient reports ongoing SOB with activity per baseline & increase in cough. Patient denies CP or fever.  11/30 facility nurse progress note:   Resident was yelling out this morning that she can't breath. The writer check O2sats, it was 84% before administered scheduled nebulizer, even after the neb O2 was still below her baseline. The writer administered O2 concentrator house standing, notify the Nurse Manager in charge and NP will come visit sometimes tomorrow. after a few hours on the concentrator oxygen, O2 sats at 93%. The writer then tried to recheck resident Oxygen without the concentrator and stayed at 92%. will continue to monitor any SOB. The resident daughter asked the writer if her mom can have a prednisone PRN if her RA flares and the writer response was to update the NP in regards of the matter and let her know.  Wt Readings from Last 4 Encounters:   11/26/21 74.2 kg  (163 lb 9.6 oz)   10/29/21 73 kg (161 lb)   10/01/21 75.5 kg (166 lb 6.4 oz)   09/17/21 75.8 kg (167 lb 3.2 oz)       ALLERGIES:Ace inhibitors, Latex, Liquid adhesive, and Tape [adhesive tape]  PAST MEDICAL HISTORY:   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    removal right TKA 6/12     Lumbar compression fracture, sequela 6/14/2021     Non Hodgkin's lymphoma (H) 6/14    chemo x4, clearing by PET     Osteoporosis 6/14/2021     Other specified glaucoma 6/14/2021     Pulmonary interstitial fibrosis (H)      Rheumatoid arthritis(714.0)     [facility]     SIADH (syndrome of inappropriate ADH production) (H) 6/14/2021     Spinal stenosis of lumbar region 6/14/2021     Steroid long-term use      Urinary retention 6/14/2021     PAST SURGICAL HISTORY:   has a past surgical history that includes REMOVAL GALLBLADDER (1981); CORRECT BUNION,SIMPLE (2002); VAGINAL HYSTERECTOMY (1981); Arthroplasty knee (11/7/2011); Exchange poly component arthroplasty knee (11/26/2011); Irrigation and debridement knee, combined (11/26/2011); Arthroplasty revision knee (12/29/2011); Irrigation and debridement knee, combined (12/29/2011); Irrigation and debridement knee, combined (2/9/2012); and NONSPECIFIC PROCEDURE (6/22/12).  FAMILY HISTORY: family history includes Eye Disorder in her mother; Family History Negative in her father; Heart Disease in her mother; Hypertension in her mother.  SOCIAL HISTORY:  reports that she has never smoked. She has never used smokeless tobacco. She reports that she does not drink alcohol and does not use drugs.    MEDICATIONS:     Review of your medicines          Accurate as of  December 2, 2021 11:59 PM. If you have any questions, ask your nurse or doctor.            CONTINUE these medicines which have NOT CHANGED      Dose / Directions   acetaminophen 325 MG tablet  Commonly known as: TYLENOL  Used for: Closed compression fracture of L3 lumbar vertebra, initial encounter (H), Bilateral low back pain without sciatica, unspecified chronicity      Dose: 975 mg  Take 3 tablets (975 mg) by mouth 3 times daily  Refills: 0     acetylcysteine 600 MG Caps capsule  Commonly known as: N-ACETYL CYSTEINE      Dose: 600 mg  Take 1 capsule (600 mg) by mouth daily  Refills: 0     albuterol 108 (90 Base) MCG/ACT inhaler  Commonly known as: PROAIR HFA/PROVENTIL HFA/VENTOLIN HFA      Dose: 2 puff  Inhale 2 puffs into the lungs every 4 hours as needed for shortness of breath / dyspnea or wheezing  Refills: 0     amLODIPine 2.5 MG tablet  Commonly known as: NORVASC  Used for: Spinal stenosis of lumbar region without neurogenic claudication, Essential hypertension, benign      Dose: 2.5 mg  Take 1 tablet (2.5 mg) by mouth daily  Refills: 0     aspirin 81 MG tablet  Commonly known as: ASA      Dose: 81 mg  Take 1 tablet (81 mg) by mouth daily  Quantity: 30 tablet  Refills: 0     atenolol 100 MG tablet  Commonly known as: TENORMIN  Used for: Essential hypertension, benign      Dose: 100 mg  Take 1 tablet (100 mg) by mouth daily  Quantity: 90 tablet  Refills: 3     diclofenac 1 % topical gel  Commonly known as: VOLTAREN      Dose: 2 g  Apply 2 g topically daily as needed for moderate pain  Refills: 0     * gabapentin 800 MG tablet  Commonly known as: NEURONTIN  Used for: Lumbar compression fracture, sequela, Spinal stenosis of lumbar region, unspecified whether neurogenic claudication present      Dose: 800 mg  Take 1 tablet (800 mg) by mouth 3 times daily  Quantity:    Refills: 0     * gabapentin 100 MG capsule  Commonly known as: NEURONTIN  Used for: Lumbar compression fracture, sequela, Spinal stenosis of  lumbar region, unspecified whether neurogenic claudication present      Dose: 200 mg  Take 2 capsules (200 mg) by mouth every morning  Refills: 0     HEMP OIL OR EXTRACT OR OTHER CBD CANNABINOID (NOT MEDICAL CANNABIS)      Dose: 1 tablet  Take 1 tablet by mouth daily CBD gummy  Refills: 0     HYDROmorphone 2 MG tablet  Commonly known as: DILAUDID  Used for: Spinal stenosis of lumbar region without neurogenic claudication      Dose: 2 mg  Take 1 tablet (2 mg) by mouth every 4 hours as needed for moderate to severe pain  Quantity: 180 tablet  Refills: 0     IBANdronate 150 MG tablet  Commonly known as: Boniva  Used for: Osteopenia, unspecified location      Dose: 150 mg  Take 1 tablet (150 mg) by mouth every 30 days Take 60 minutes before am meal with 8 oz. water. Remain upright for 60 minutes.  Quantity: 3 tablet  Refills: 3     ibuprofen 200 MG tablet  Commonly known as: ADVIL/MOTRIN      Dose: 200 mg  Take 200 mg by mouth 2 times daily  Refills: 0     levOCARNitine 330 MG tablet  Commonly known as: CARNITOR      Dose: 990 mg  Take 990 mg by mouth 2 times daily  Refills: 0     Lidocaine 4 % Patch  Commonly known as: LIDOCARE  Used for: Closed compression fracture of L3 lumbar vertebra, initial encounter (H), Bilateral low back pain without sciatica, unspecified chronicity      Dose: 1 patch  Place 1 patch onto the skin every 24 hours To prevent lidocaine toxicity, patient should be patch free for 12 hrs daily.  Quantity: 10 patch  Refills: 0     loratadine 10 MG tablet  Commonly known as: CLARITIN      Dose: 10 mg  Take 10 mg by mouth daily  Refills: 0     losartan 100 MG tablet  Commonly known as: COZAAR  Used for: Essential hypertension, benign      Dose: 100 mg  Take 1 tablet (100 mg) by mouth daily  Quantity: 90 tablet  Refills: 3     medical cannabis  (Patient's own supply)      See Admin Instructions (The purpose of this order is to document that the patient reports taking medical cannabis.  This is not a  "prescription, and is not used to certify that the patient has a qualifying medical condition.)  Refills: 0     Melatonin 5 MG Chew      Dose: 5 mg  Take 5 mg by mouth At Bedtime  Refills: 0     naloxone 4 MG/0.1ML nasal spray  Commonly known as: NARCAN      Dose: 4 mg  Spray 4 mg into one nostril alternating nostrils once as needed for opioid reversal  Refills: 0     Plaquenil 200 MG tablet  Generic drug: hydroxychloroquine      Dose: 200 mg  Take 1 tablet by mouth 2 times daily.  Quantity: 60 tablet  Refills: 1     polyethylene glycol 17 GM/Dose powder  Commonly known as: MIRALAX  Used for: Slow transit constipation      Dose: 17 g  Take 17 g by mouth 2 times daily  Quantity: 510 g  Refills: 0     predniSONE 5 MG tablet  Commonly known as: DELTASONE      Dose: 5 mg  Take 5 mg by mouth daily  Refills: 0     sodium chloride 1 GM tablet  Used for: SIADH (syndrome of inappropriate ADH production) (H)      Dose: 1 g  Take 1 tablet (1 g) by mouth daily  Refills: 0     vitamin D3 1000 units (25 mcg) tablet  Commonly known as: CHOLECALCIFEROL      Dose: 1,000 Units  Take 1 tablet (1,000 Units) by mouth daily  Quantity: 100 tablet  Refills: 3         * This list has 2 medication(s) that are the same as other medications prescribed for you. Read the directions carefully, and ask your doctor or other care provider to review them with you.               Case Management:  I have reviewed the care plan and MDS and do agree with the plan. Patient's desire to return to the community is not present. Information reviewed:  Medications, vital signs, orders, and nursing notes.    ROS:  10 point ROS of systems including Constitutional, Eyes, Respiratory, Cardiovascular, Gastroenterology, Genitourinary, Integumentary, Musculoskeletal, Psychiatric were all negative except for pertinent positives noted in my HPI.    Vitals:  /69   Pulse 96   Temp 97.1  F (36.2  C)   Resp 18   Ht 1.499 m (4' 11\")   Wt 74.2 kg (163 lb 9.6 oz)   " LMP  (LMP Unknown)   SpO2 94%   BMI 33.04 kg/m    Body mass index is 33.04 kg/m .  Exam:  GENERAL APPEARANCE:  Alert, in no distress, cooperative  ENT:  oral mucous membranes moist, hearing intact to conversation   EYES: conjunctiva and lids normal  RESP: crackles to LLL otherwise lungs clear to auscultation, no respiratory distres, no cough during visit  CV:  RRR  ABDOMEN:  bowel sounds normal, soft, non-tender  M/S:  decreased muscle tone, no LE edema  PSYCH:  oriented X 3, affect and mood normal    Lab/Diagnostic data:   Recent labs in [facility] reviewed by me today.     ASSESSMENT/PLAN  (J84.10) Pulmonary interstitial fibrosis (H)  (primary encounter diagnosis)  (R09.02) Hypoxia  Comment: acute on chronic- has been utilizing supplemental O2 for the last 3 days  Plan:   --repeat CXR AP/L today   --continue duoneb TID & daily prn   --discontinue albuterol inhaler     (G47.33) JIGNESH (obstructive sleep apnea)  Comment: daughter recently brought in CPAP machine; education provided to patient on importance of regular use  Plan:   --CPAP nightly   --check O2 levels q shift & in the middle of night while on RA for 1 week- update NP with results    (M06.9) Rheumatoid arthritis involving multiple sites, unspecified whether rheumatoid factor present (H)  Comment: chronic, daughter requesting prn prednisone   Plan:   --continue without medication change, will offer prednisone for RA flares  --schedule follow up appt with Rheumatologist       (I10) Essential hypertension  (E87.6) Hypokalemia   Comment: labile BPs, SBP 130s-150s with HR 80-90s; KCl discontinued in Sept with normal repeat K+ level on 11/3  Plan:   --follow BMP  --continue daily vitals   --continue amlodipine 2.5 mg daily, atenolol 100 mg daily, losartan 100 mg daily     (E22.2) SIADH (syndrome of inappropriate ADH production) (H)  Comment: chronic hyponatremia: Na+ levels range from 116-125 since 12/2020 however has now been holding low-130s- most recent 131; seen  by Nephrology 12/2020- elevated urine osmolality suggested SIADH  Plan:   --continue sodium chloride 1 g daily   --follow BMP  --continue fluid restriction 1,500 mL/day      (D64.9) Anemia  Comment: most recent iron level elevated in 400s, Hgb 13.7 on 11/2; no longer on Fe  Plan:  --follow BMP    Orders:   1. Schedule follow up with Rheumatologist   2. Repeat CXR AP/L today  3. Wean off O2, check O2 levels q shift & in the middle of night while on RA for 1 week- update NP with results      Electronically signed by:  Alanna Castañeda NP            Sincerely,        Alanna Castañeda, NP

## 2021-12-09 NOTE — TELEPHONE ENCOUNTER
[facility] Geriatrics Lab Note     Provider: Alanna Castañeda NP   Facility: [facility] Facility Type:  LTC    Allergies   Allergen Reactions     Ace Inhibitors Cough     Latex GI Disturbance     lip swelling     Liquid Adhesive Rash     Tape [Adhesive Tape] Rash       Labs Reviewed by provider: Sodium 131. Hx of chronic hyponatremia. Currently on Sodium Chl 1g every day.      Verbal Order/Direction given by Provider: SRINI    Provider giving Order:  Alanna Castañeda NP     Verbal Order given to: Nikhil Wyman RN

## 2021-12-13 NOTE — PROGRESS NOTES
[facility] GERIATRIC SERVICES    Chief Complaint   Patient presents with     RECHECK     anxiety     HPI:  Nicolás Wyman is a 81 year old  (1940), who is being seen today for an episodic care visit at: [facility] () [[id number]]. Today's concern is: Daughter requesting medication for anxiety, possible increase in sertraline however I do not see patient is currently taking this medication. Upon discussing with patient, her main complaint is not being able to sleep 2/2 anxiety. She generally does not have issues initially falling asleep however does wake ~0100 & is unable to fall back asleep.   Patient has been utilizing supplemental O2 for the last few weeks.CXR from 11 days ago negative for acute findings. Afebrile. Patient reports nasal congestion today. O2 has been 90-95% on RA for the last 3 days per facility charting. Started on scheduled duonebs. Patient feels cough has improved.       Allergies, and PMH/PSH reviewed in [facility] today.  REVIEW OF SYSTEMS:  10 point ROS of systems including Constitutional, Eyes, Respiratory, Cardiovascular, Gastroenterology, Genitourinary, Integumentary, Musculoskeletal, Psychiatric were all negative except for pertinent positives noted in my HPI.    Objective:   BP (!) 158/77   Pulse 99   Temp 97.2  F (36.2  C)   LMP  (LMP Unknown)   SpO2 90%    GENERAL APPEARANCE:  Alert, in no distress, cooperative, calm  ENT:  oral mucous membranes moist, hearing intact to conversation   EYES: conjunctiva and lids normal  RESP: diminished lung sounds, clear to auscultation, no respiratory distres  CV:  RRR  ABDOMEN:  bowel sounds normal, soft, non-tender  M/S:  decreased muscle tone, no LE edema  PSYCH:  oriented X 3, affect and mood normal    Recent labs in [facility] reviewed by me today.     Assessment/Plan:  (F41.9) Anxiety  (primary encounter diagnosis)  (G47.00) Insomnia, unspecified type  Comment: chronic  Plan:   --trial Remeron 15 mg PO at HS  --monitor for adverse effects,  response     (J84.10) Pulmonary interstitial fibrosis (H)  (R09.02) Hypoxia  Comment: acute on chronic- has been utilizing supplemental O2 on & off for the last couple weeks (none in the last 3 days)  Plan:   --start incentive Spirometer q 2 hrs while awake, please provide patient with education on proper use   --continue duoneb TID & daily prn      (G47.33) JIGNESH (obstructive sleep apnea)  Comment: middle of the night O2 checks have been 92-96%, CPAP bedside   Plan:   --continue to encourage CPAP use nightly    (R09.81) Nasal congestion  Comment: increase in congestion with some PND  Plan:  --Nasacort nasal spray daily  --Mucinex 600 mg BID   --monitor for response     Orders:  1. Remeron 15 mg PO at HS  2. Mucinex  mg PO BID  3. Nasacort Allergy 24HR: 55 mcg/act, Two sprays (110 mcg) in each nostril once daily  4. Incentive Spirometer q 2 hrs while awake, please provide patient with education on proper use          Electronically signed by: Alanna Castañeda NP

## 2021-12-13 NOTE — LETTER
12/13/2021        RE: Nicolás Wyman  [facility]  [address]  [facility] [address]         HEALTH GERIATRIC SERVICES    Chief Complaint   Patient presents with     RECHECK     anxiety     HPI:  Nicolás Wyman is a 81 year old  (1940), who is being seen today for an episodic care visit at: [facility] () [[id number]]. Today's concern is: Daughter requesting medication for anxiety, possible increase in sertraline however I do not see patient is currently taking this medication. Upon discussing with patient, her main complaint is not being able to sleep 2/2 anxiety. She generally does not have issues initially falling asleep however does wake ~0100 & is unable to fall back asleep.   Patient has been utilizing supplemental O2 for the last few weeks.CXR from 11 days ago negative for acute findings. Afebrile. Patient reports nasal congestion today. O2 has been 90-95% on RA for the last 3 days per facility charting. Started on scheduled duonebs. Patient feels cough has improved.       Allergies, and PMH/PSH reviewed in [facility] today.  REVIEW OF SYSTEMS:  10 point ROS of systems including Constitutional, Eyes, Respiratory, Cardiovascular, Gastroenterology, Genitourinary, Integumentary, Musculoskeletal, Psychiatric were all negative except for pertinent positives noted in my HPI.    Objective:   BP (!) 158/77   Pulse 99   Temp 97.2  F (36.2  C)   LMP  (LMP Unknown)   SpO2 90%    GENERAL APPEARANCE:  Alert, in no distress, cooperative, calm  ENT:  oral mucous membranes moist, hearing intact to conversation   EYES: conjunctiva and lids normal  RESP: diminished lung sounds, clear to auscultation, no respiratory distres  CV:  RRR  ABDOMEN:  bowel sounds normal, soft, non-tender  M/S:  decreased muscle tone, no LE edema  PSYCH:  oriented X 3, affect and mood normal    Recent labs in [facility] reviewed by me today.     Assessment/Plan:  (F41.9) Anxiety  (primary encounter diagnosis)  (G47.00)  Insomnia, unspecified type  Comment: chronic  Plan:   --trial Remeron 15 mg PO at HS  --monitor for adverse effects, response     (J84.10) Pulmonary interstitial fibrosis (H)  (R09.02) Hypoxia  Comment: acute on chronic- has been utilizing supplemental O2 on & off for the last couple weeks (none in the last 3 days)  Plan:   --start incentive Spirometer q 2 hrs while awake, please provide patient with education on proper use   --continue duoneb TID & daily prn      (G47.33) JIGNESH (obstructive sleep apnea)  Comment: middle of the night O2 checks have been 92-96%, CPAP bedside   Plan:   --continue to encourage CPAP use nightly    (R09.81) Nasal congestion  Comment: increase in congestion with some PND  Plan:  --Nasacort nasal spray daily  --Mucinex 600 mg BID   --monitor for response     Orders:  1. Remeron 15 mg PO at HS  2. Mucinex  mg PO BID  3. Nasacort Allergy 24HR: 55 mcg/act, Two sprays (110 mcg) in each nostril once daily  4. Incentive Spirometer q 2 hrs while awake, please provide patient with education on proper use          Electronically signed by: Alanna Castañeda NP           Sincerely,        Alanna Castañeda NP

## 2021-12-25 NOTE — PROGRESS NOTES
Pt was seen for a regulatory LTC visit    Pt was recently evaluated for SOB  CXR early December wnl    Pt denies cough, chest pain, SOB  Main c/o chronic back and B knee pain    Exam  Sitting in recliner at the bedside  Sleepy, easily alerts, in NAD  RR 12  Lungs clear  CV rrr  Abd soft  No LE edema      Assessment    Chronic back and B knee pain, history of OA, L spine stenosis, RA    History of RA, on chronic prednisone (5 mg daily), hydroxychloroquine     History of pulmonary fibrosis, JIGNESH. No evidence of acute process    Chronic depression, anxiety, on newly started Remeron, benefit unclear    History of chronic hyponatremia, stable     HTN, stable on amlodipine, losartan, Atenolol    Plan  Continue current tx  Monitor for response to remeron        Plan  Continue current pain medication regimen  Monitor resp status

## 2022-09-19 NOTE — ASSESSMENT & PLAN NOTE
[facility]  Obstetrics and Gynecology    HPI: Barbara Weller is a 61 year old G0 presenting with complaints of abnormal discharge.  Patient reports 2 episodes of having a copious amount of creamy/yellow with slight brown tinge discharge.  The last episode was on 8/01/22.  She denies having any vaginal/vulvar itching or irritation.  She denies any history of postmenopausal bleeding. She has not been sexually active in years.  She also notes abdominal discomfort.  The discomfort is intermittent, just below the umbilicus.  She also notes excessive bloating.    ROS:  Gen: denies fever, chills, +weight gain  CV: denies heart palpitations, chest pain  Pulm: denies SOB, cough  GI: Denies abdominal pain, nausea, vomiting, diarrhea, constipation (takes daily fiber)  : denies dysuria, hematuria, increased urinary frequency    Past Medical History:   Diagnosis Date   • Allergic rhinitis    • Essential hypertension    • Lumbago    • Shingles         has a past surgical history that includes Nasal sinus surgery (2012) and oral surgery procedure.  Current Outpatient Medications   Medication Sig Dispense Refill   • Glucosamine-Chondroitin (GLUCOSAMINE CHONDR COMPLEX PO)      • metoPROLOL succinate (TOPROL-XL) 25 MG 24 hr tablet TAKE 1/2 TABLET BY MOUTH ONE TIME A DAY AS INSTRUCTED 45 tablet 0   • naproxen (NAPROSYN) 500 MG tablet TAKE 1 TABLET BY MOUTH TWO TIMES A DAY  60 tablet 1   • omeprazole (PrilOSEC) 20 MG capsule TAKE 1 CAPSULE BY MOUTH TWO TIMES A DAY  180 capsule 0   • halobetasol (ULTRAVATE) 0.05 % cream Apply twice daily to the hands for two weeks, then take a 2 week break, then repeat as needed 50 g 2   • triamcinolone (ARISTOCORT) 0.1 % cream Apply twice daily on more severely affected areas of trunk & extremities for two weeks; AVOID use on face and body folds 454 g 1   • VITAMIN D, ERGOCALCIFEROL, PO Take  by mouth. - Oral     • Ascorbic Acid (VITAMIN C PO) Take  by mouth. - Oral       No current  L3 Burst. Not amenable to vertebroplasty   facility-administered medications for this visit.     ALLERGIES:   Allergen Reactions   • Sulfa Antibiotics RASH     macular papular     Social History     Tobacco Use   • Smoking status: Never Smoker   • Smokeless tobacco: Never Used   Vaping Use   • Vaping Use: never used   Substance Use Topics   • Alcohol use: Never   • Drug use: Never   Objective:  Vitals:    09/19/22 1127   BP: 112/78   BP Location: RUE - Right upper extremity   Patient Position: Sitting   Cuff Size: Regular   Pulse: 69   Resp: 16   Temp: 97.7 °F (36.5 °C)   TempSrc: Temporal   SpO2: 97%   Weight: 62.6 kg (138 lb 1.6 oz)   Height: 5' 1\" (1.549 m)   Physical Exam  Gen: Patient appears well, in no apparent distress  Neuro: alert and oriented x3  CV: RRR, normal S1 and S2, no extra heart sounds  Pulm: CTAB  Abd: non-distended, no masses palpable, no tenderness  Skin: no rashes or suspicious skin lesions noted  Extr: no edema    Pelvic Exam:  Genitalia -   External Genitalia: Normal appearance and hair distribution.  No lesions noted, atrophic, introitus open 1.5cm at max  Urethral Meatus: Normal size and location, no lesions or prolapse.  Urethra: No masses, tenderness or scarring  Bladder: No fullness, masses or tenderness  Vagina: Normal general appearance, no discharge or lesions.    Cervix: Normal appearance without lesions or discharge, atrophic  Uterus: normal contour, position, mobility, and support.  No tenderness  Adnexa:  No masses, tenderness, organomegaly or nodularity noted    Assessment and Plan: Barbara Weller is a 61 year old G0 presenting with complaints of abnormal discharge    Exam without abnormality  Reviewed patient picture of discharge-slight brown tint and thus equivocal  Pelvic ultrasound ordered  Reviewed signs/symptoms that warrant immediate visit  Management pending results    Alphonse Tolliver MD

## 2022-12-23 NOTE — TELEPHONE ENCOUNTER
"Requested Prescriptions   Pending Prescriptions Disp Refills     amLODIPine (NORVASC) 5 MG tablet [Pharmacy Med Name: AMLODIPINE 5MG TAB] 135 tablet 3    Last Written Prescription Date:  11/20/2017  Last Fill Quantity: 135,  # refills: 3   Last office visit: 11/15/2018 with prescribing provider:     Future Office Visit:   Sig: TAKE ONE AND ONE-HALF TABLETS BY MOUTH ONCE DAILY    Calcium Channel Blockers Protocol  Passed - 1/15/2019  3:07 PM       Passed - Blood pressure under 140/90 in past 12 months    BP Readings from Last 3 Encounters:   11/15/18 120/60   09/26/18 146/70   09/05/18 100/70                Passed - Recent (12 mo) or future (30 days) visit within the authorizing provider's specialty    Patient had office visit in the last 12 months or has a visit in the next 30 days with authorizing provider or within the authorizing provider's specialty.  See \"Patient Info\" tab in inbasket, or \"Choose Columns\" in Meds & Orders section of the refill encounter.             Passed - Medication is active on med list       Passed - Patient is age 18 or older       Passed - No active pregnancy on record       Passed - Normal serum creatinine on file in past 12 months    Recent Labs   Lab Test 11/15/18  0853  05/30/14  0942   CR 0.65   < >  --    CREAT  --   --  0.7    < > = values in this interval not displayed.            Passed - No positive pregnancy test in past 12 months        atenolol (TENORMIN) 100 MG tablet [Pharmacy Med Name: ATENOLOL 100MG      TAB] 90 tablet 3    Last Written Prescription Date:  09/05/2018  Last Fill Quantity: 90,  # refills: 3   Last office visit: 11/15/2018 with prescribing provider:     Future Office Visit:   Sig: TAKE ONE TABLET BY MOUTH ONCE DAILY    Beta-Blockers Protocol Passed - 1/15/2019  3:07 PM       Passed - Blood pressure under 140/90 in past 12 months    BP Readings from Last 3 Encounters:   11/15/18 120/60   09/26/18 146/70   09/05/18 100/70                Passed - Patient is " "age 6 or older       Passed - Recent (12 mo) or future (30 days) visit within the authorizing provider's specialty    Patient had office visit in the last 12 months or has a visit in the next 30 days with authorizing provider or within the authorizing provider's specialty.  See \"Patient Info\" tab in inbasket, or \"Choose Columns\" in Meds & Orders section of the refill encounter.             Passed - Medication is active on med list        " Assistance with ambulation/Communicate Risk of Fall with Harm to all staff/Reinforce activity limits and safety measures with patient and family/Tailored Fall Risk Interventions/Visual Cue: Yellow wristband and red socks/Bed in lowest position, wheels locked, appropriate side rails in place/Call bell, personal items and telephone in reach/Instruct patient to call for assistance before getting out of bed or chair/Non-slip footwear when patient is out of bed/[place] to call system/Physically safe environment - no spills, clutter or unnecessary equipment/Purposeful Proactive Rounding/Room/bathroom lighting operational, light cord in reach

## 2024-10-11 NOTE — LETTER
"    9/23/2021        RE: Nicolás Wyman  [address]  [facility] [address]        [facility]    Chief Complaint   Patient presents with     RECHECK     urinary retention     HPI:  Nicolás Wyman is a 81 year old  (1940), who is being seen today for an episodic care visit at: [facility] () [[id number]]. PMH significant for hypertension, rheumatoid arthritis, long-term steroid use, remote history of non-Hodgkin's lymphoma (diagnosed in 2014), interstitial pulmonary fibrosis, SIADH, compression fracture of L3, lumbar spinal stenosis, osteoporosis. Residing in LT since 08/2021 following hospitalization for weakness & fall.    Today's concern is: patient's main complaint this morning is itching caused by niño catheter leg bag straps. Chronic pain unchanged. She is working with physical therapy this morning. Patient denies SOB, CP, dizziness or headache.    Wt Readings from Last 4 Encounters:   09/17/21 75.8 kg (167 lb 3.2 oz)   08/28/21 75.8 kg (167 lb 1.6 oz)   08/22/21 81.6 kg (180 lb)   06/14/21 78.5 kg (173 lb)       Allergies, and PMH/PSH reviewed in [facility] today.  REVIEW OF SYSTEMS:  10 point ROS of systems including Constitutional, Eyes, Respiratory, Cardiovascular, Gastroenterology, Genitourinary, Integumentary, Musculoskeletal, Psychiatric were all negative except for pertinent positives noted in my HPI.    Objective:   BP (!) 176/86   Pulse 84   Temp 97.2  F (36.2  C)   Resp 18   Ht 1.499 m (4' 11\")   Wt 75.8 kg (167 lb 3.2 oz)   LMP  (LMP Unknown)   SpO2 92%   BMI 33.77 kg/m    GENERAL APPEARANCE:  Alert, in no distress, cooperative  ENT:  oral mucous membranes moist  EYES: conjunctiva and lids normal  RESP:  lungs clear to auscultation , no respiratory distress  CV:  RRR  ABDOMEN:  bowel sounds normal, soft, non-tender  M/S:   no edema  SKIN:  large bruise L knee  PSYCH:  oriented X 3, affect and mood normal      Most Recent 3 CBC's:  Recent Labs   Lab Test " Patient was outreached but did not answer. A voicemail was left for the patient to return our call. [phone] --  Patient discussed with team.   Mother is not able to administer Unasyn q6hrs due to her work schedule.    We decided to switch patient to ceftriaxone 50mg/kg IV q24hrs PLUS metronidazole 10mg/kg/dose PO TID.  Dr. Christina requested susceptibility for ceftriaxone and MSSA from the nose, and the result is susceptible per [facility].  Strep intermedius was the pathogen identified from the subperiosteal abscess.  These infections can be polymicrobial.    Patient will follow with Dr. Christina in the outpatient setting.  Please give him an appt for this Friday 10/11/24 with Dr. Christina.      Ray Peterson MD, MS  Attending, Infectious Disease 08/22/21  1211 07/22/21  0925 12/28/20  1054   WBC 6.7 6.9 9.2   HGB 12.9 12.7 13.1   MCV 95 95 95    296 394     Most Recent 3 BMP's:  Recent Labs   Lab Test 08/27/21  0608 08/26/21  0602 08/25/21  0709 08/24/21  0731 08/24/21  0731 08/23/21  1401 08/23/21  0700   * 128* 128*   < > 127*   < > 125*   POTASSIUM  --   --  4.1  --  5.0  --  3.8   CHLORIDE  --   --  94  --  92*  --  87*   CO2  --   --  36*  --  31  --  32   BUN  --   --  9  --  7  --  7   CR  --   --  0.42*  --  0.43*  --  0.44*   ANIONGAP  --   --  <1*  --  4  --  6   KRISTOPHER  --   --  8.4*  --  8.6  --  8.4*   GLC  --   --  86  --  82  --  78    < > = values in this interval not displayed.       Assessment/Plan:  (M62.81) Generalized muscle weakness  (primary encounter diagnosis)  (S32.000S) Lumbar compression fracture, sequela  (M48.061) Spinal stenosis of lumbar region, unspecified whether neurogenic claudication present  (M80.00XS) Osteoporosis with current pathological fracture, unspecified osteoporosis type, sequela  Comment: weakness multifactorial: lumbar CT showed high-grade compression fracture with fragmentation at L3 with progressive loss of height contributing to severe spinal stenosis; currently lift for transfers with staff but ambulating short distances with physical therapy; no history of spinal surgery, history of bilat knee surgery   Plan:   --continue Ibandronate Sodium 150 mg monthly for osteoporosis  --continue gabapentin 1,000 mg q AM & 800 mg BID  --continue Tylenol 975 mg TID, ibuprofen 200 mg BID, dilaudid 2 mg q 4 hr prn, lidocaine patch daily  --continue physical therapy & occupational therapy for strengthening, balance, safety      (E22.2) SIADH (syndrome of inappropriate ADH production) (H)  (E87.1) Hyponatremia  Comment: chronic hyponatremia: Na+ levels range from 116-125 since 12/2020; seen by Nephrology 12/2020- elevated urine osmolality suggested SIADH  Plan:   --decrease sodium chloride to 1 g daily   --BMP  next week   --continue fluid restriction 1,500 mL/day      (R33.9) Urinary retention  Comment: acute; failed voiding trial x3 despite Flomax  Plan:   --discontinue Flomax   --continue niño cath      (D64.9) Anemia  Comment: most recent iron level elevated in 400s with normal Hgb- ferrous sulfate discontinued  Plan:  --CBC next week      (I10) Essential hypertension  (E87.6) Hypokalemia   Comment: labile BPs; KCl discontinued last month  Plan:   --continue daily vitals   --continue amlodipine 2.5 mg daily, atenolol 100 mg daily, losartan 100 mg daily  --BMP next week     Orders:  1. Decrease sodium chloride to 1 g daily   2. Discontinue Flomax   3. BMP & CBC next week     Electronically signed by: Alanna Castañeda NP           Sincerely,        Alanna Castañeda NP